# Patient Record
Sex: FEMALE | Race: WHITE | NOT HISPANIC OR LATINO
[De-identification: names, ages, dates, MRNs, and addresses within clinical notes are randomized per-mention and may not be internally consistent; named-entity substitution may affect disease eponyms.]

---

## 2017-01-18 ENCOUNTER — APPOINTMENT (OUTPATIENT)
Dept: NEUROLOGY | Facility: CLINIC | Age: 42
End: 2017-01-18

## 2017-02-07 ENCOUNTER — APPOINTMENT (OUTPATIENT)
Dept: INTERNAL MEDICINE | Facility: CLINIC | Age: 42
End: 2017-02-07

## 2017-02-07 VITALS
BODY MASS INDEX: 24.91 KG/M2 | WEIGHT: 155 LBS | HEART RATE: 89 BPM | DIASTOLIC BLOOD PRESSURE: 76 MMHG | HEIGHT: 66 IN | SYSTOLIC BLOOD PRESSURE: 124 MMHG | OXYGEN SATURATION: 98 %

## 2017-02-24 LAB
25(OH)D3 SERPL-MCNC: 15.4 NG/ML
ALBUMIN SERPL ELPH-MCNC: 4.3 G/DL
ALP BLD-CCNC: 62 U/L
ALT SERPL-CCNC: 18 U/L
ANA PAT FLD IF-IMP: ABNORMAL
ANA SER IF-ACNC: ABNORMAL
ANION GAP SERPL CALC-SCNC: 12 MMOL/L
AST SERPL-CCNC: 25 U/L
BASOPHILS # BLD AUTO: 0.06 K/UL
BASOPHILS NFR BLD AUTO: 0.7 %
BILIRUB SERPL-MCNC: 0.9 MG/DL
BUN SERPL-MCNC: 7 MG/DL
CALCIUM SERPL-MCNC: 9.6 MG/DL
CCP AB SER IA-ACNC: 9 UNITS
CHLORIDE SERPL-SCNC: 101 MMOL/L
CO2 SERPL-SCNC: 26 MMOL/L
CREAT SERPL-MCNC: 0.78 MG/DL
EOSINOPHIL # BLD AUTO: 0.06 K/UL
EOSINOPHIL NFR BLD AUTO: 0.7 %
GLUCOSE SERPL-MCNC: 78 MG/DL
HBA1C MFR BLD HPLC: 5.6 %
HCT VFR BLD CALC: 39 %
HGB BLD-MCNC: 12.2 G/DL
IMM GRANULOCYTES NFR BLD AUTO: 0.2 %
LYMPHOCYTES # BLD AUTO: 2.41 K/UL
LYMPHOCYTES NFR BLD AUTO: 28.9 %
MAN DIFF?: NORMAL
MCHC RBC-ENTMCNC: 26 PG
MCHC RBC-ENTMCNC: 31.3 GM/DL
MCV RBC AUTO: 83.2 FL
MONOCYTES # BLD AUTO: 0.76 K/UL
MONOCYTES NFR BLD AUTO: 9.1 %
NEUTROPHILS # BLD AUTO: 5.04 K/UL
NEUTROPHILS NFR BLD AUTO: 60.4 %
PLATELET # BLD AUTO: 378 K/UL
POTASSIUM SERPL-SCNC: 4.4 MMOL/L
PROT SERPL-MCNC: 8.3 G/DL
RBC # BLD: 4.69 M/UL
RBC # FLD: 14.8 %
RF+CCP IGG SER-IMP: NEGATIVE
RHEUMATOID FACT SER QL: 17.3 IU/ML
SODIUM SERPL-SCNC: 139 MMOL/L
T4 FREE SERPL-MCNC: 1.1 NG/DL
TSH SERPL-ACNC: 1.11 UIU/ML
WBC # FLD AUTO: 8.35 K/UL

## 2017-02-28 ENCOUNTER — MEDICATION RENEWAL (OUTPATIENT)
Age: 42
End: 2017-02-28

## 2017-03-01 ENCOUNTER — MEDICATION RENEWAL (OUTPATIENT)
Age: 42
End: 2017-03-01

## 2017-03-16 ENCOUNTER — APPOINTMENT (OUTPATIENT)
Dept: INTERNAL MEDICINE | Facility: CLINIC | Age: 42
End: 2017-03-16

## 2017-03-16 VITALS
SYSTOLIC BLOOD PRESSURE: 104 MMHG | DIASTOLIC BLOOD PRESSURE: 64 MMHG | HEIGHT: 66 IN | WEIGHT: 154 LBS | BODY MASS INDEX: 24.75 KG/M2 | TEMPERATURE: 98.5 F

## 2017-03-20 LAB
ANION GAP SERPL CALC-SCNC: 13 MMOL/L
BASOPHILS # BLD AUTO: 0.02 K/UL
BASOPHILS NFR BLD AUTO: 0.3 %
BUN SERPL-MCNC: 8 MG/DL
CALCIUM SERPL-MCNC: 9.3 MG/DL
CHLORIDE SERPL-SCNC: 100 MMOL/L
CO2 SERPL-SCNC: 24 MMOL/L
CREAT SERPL-MCNC: 0.88 MG/DL
EOSINOPHIL # BLD AUTO: 0.01 K/UL
EOSINOPHIL NFR BLD AUTO: 0.2 %
GLUCOSE SERPL-MCNC: 89 MG/DL
HCT VFR BLD CALC: 41.5 %
HGB BLD-MCNC: 13 G/DL
IMM GRANULOCYTES NFR BLD AUTO: 0.3 %
LYMPHOCYTES # BLD AUTO: 1.49 K/UL
LYMPHOCYTES NFR BLD AUTO: 24 %
MAN DIFF?: NORMAL
MCHC RBC-ENTMCNC: 26.7 PG
MCHC RBC-ENTMCNC: 31.3 GM/DL
MCV RBC AUTO: 85.2 FL
MONOCYTES # BLD AUTO: 1.02 K/UL
MONOCYTES NFR BLD AUTO: 16.4 %
NEUTROPHILS # BLD AUTO: 3.65 K/UL
NEUTROPHILS NFR BLD AUTO: 58.8 %
PLATELET # BLD AUTO: 290 K/UL
POTASSIUM SERPL-SCNC: 4.2 MMOL/L
RBC # BLD: 4.87 M/UL
RBC # FLD: 15.2 %
SODIUM SERPL-SCNC: 137 MMOL/L
TSH SERPL-ACNC: 0.97 UIU/ML
WBC # FLD AUTO: 6.21 K/UL

## 2017-03-22 ENCOUNTER — OTHER (OUTPATIENT)
Age: 42
End: 2017-03-22

## 2017-04-03 ENCOUNTER — APPOINTMENT (OUTPATIENT)
Dept: RADIOLOGY | Facility: IMAGING CENTER | Age: 42
End: 2017-04-03

## 2017-04-03 ENCOUNTER — OUTPATIENT (OUTPATIENT)
Dept: OUTPATIENT SERVICES | Facility: HOSPITAL | Age: 42
LOS: 1 days | End: 2017-04-03
Payer: COMMERCIAL

## 2017-04-03 DIAGNOSIS — Z90.89 ACQUIRED ABSENCE OF OTHER ORGANS: Chronic | ICD-10-CM

## 2017-04-03 DIAGNOSIS — Z98.89 OTHER SPECIFIED POSTPROCEDURAL STATES: Chronic | ICD-10-CM

## 2017-04-03 DIAGNOSIS — C43.71 MALIGNANT MELANOMA OF RIGHT LOWER LIMB, INCLUDING HIP: Chronic | ICD-10-CM

## 2017-04-03 DIAGNOSIS — J47.9 BRONCHIECTASIS, UNCOMPLICATED: ICD-10-CM

## 2017-04-03 PROCEDURE — 71046 X-RAY EXAM CHEST 2 VIEWS: CPT

## 2017-04-07 ENCOUNTER — APPOINTMENT (OUTPATIENT)
Dept: PULMONOLOGY | Facility: CLINIC | Age: 42
End: 2017-04-07

## 2017-04-07 VITALS
WEIGHT: 157 LBS | DIASTOLIC BLOOD PRESSURE: 70 MMHG | HEIGHT: 66 IN | HEART RATE: 87 BPM | SYSTOLIC BLOOD PRESSURE: 100 MMHG | RESPIRATION RATE: 16 BRPM | TEMPERATURE: 99.2 F | BODY MASS INDEX: 25.23 KG/M2

## 2017-04-07 DIAGNOSIS — Z83.49 FAMILY HISTORY OF OTHER ENDOCRINE, NUTRITIONAL AND METABOLIC DISEASES: ICD-10-CM

## 2017-04-19 ENCOUNTER — APPOINTMENT (OUTPATIENT)
Dept: CT IMAGING | Facility: IMAGING CENTER | Age: 42
End: 2017-04-19

## 2017-04-19 ENCOUNTER — OUTPATIENT (OUTPATIENT)
Dept: OUTPATIENT SERVICES | Facility: HOSPITAL | Age: 42
LOS: 1 days | End: 2017-04-19
Payer: COMMERCIAL

## 2017-04-19 DIAGNOSIS — C43.71 MALIGNANT MELANOMA OF RIGHT LOWER LIMB, INCLUDING HIP: Chronic | ICD-10-CM

## 2017-04-19 DIAGNOSIS — R05 COUGH: ICD-10-CM

## 2017-04-19 DIAGNOSIS — Z98.89 OTHER SPECIFIED POSTPROCEDURAL STATES: Chronic | ICD-10-CM

## 2017-04-19 DIAGNOSIS — Z90.89 ACQUIRED ABSENCE OF OTHER ORGANS: Chronic | ICD-10-CM

## 2017-04-19 PROCEDURE — 70486 CT MAXILLOFACIAL W/O DYE: CPT

## 2017-04-24 ENCOUNTER — RX RENEWAL (OUTPATIENT)
Age: 42
End: 2017-04-24

## 2017-04-27 ENCOUNTER — APPOINTMENT (OUTPATIENT)
Dept: DERMATOLOGY | Facility: CLINIC | Age: 42
End: 2017-04-27

## 2017-04-27 VITALS — DIASTOLIC BLOOD PRESSURE: 70 MMHG | SYSTOLIC BLOOD PRESSURE: 110 MMHG

## 2017-05-16 ENCOUNTER — APPOINTMENT (OUTPATIENT)
Dept: DERMATOLOGY | Facility: CLINIC | Age: 42
End: 2017-05-16

## 2017-05-23 ENCOUNTER — APPOINTMENT (OUTPATIENT)
Dept: INTERNAL MEDICINE | Facility: CLINIC | Age: 42
End: 2017-05-23

## 2017-06-22 ENCOUNTER — MEDICATION RENEWAL (OUTPATIENT)
Age: 42
End: 2017-06-22

## 2017-06-22 RX ORDER — INHALER, ASSIST DEVICES
SPACER (EA) MISCELLANEOUS
Qty: 1 | Refills: 0 | Status: ACTIVE | COMMUNITY
Start: 2017-06-22 | End: 1900-01-01

## 2017-08-07 ENCOUNTER — APPOINTMENT (OUTPATIENT)
Dept: DERMATOLOGY | Facility: CLINIC | Age: 42
End: 2017-08-07
Payer: COMMERCIAL

## 2017-08-07 VITALS — DIASTOLIC BLOOD PRESSURE: 62 MMHG | SYSTOLIC BLOOD PRESSURE: 100 MMHG

## 2017-08-07 DIAGNOSIS — L82.1 OTHER SEBORRHEIC KERATOSIS: ICD-10-CM

## 2017-08-07 DIAGNOSIS — D22.5 MELANOCYTIC NEVI OF TRUNK: ICD-10-CM

## 2017-08-07 PROCEDURE — 99214 OFFICE O/P EST MOD 30 MIN: CPT | Mod: GC

## 2017-08-11 ENCOUNTER — RX RENEWAL (OUTPATIENT)
Age: 42
End: 2017-08-11

## 2017-08-11 ENCOUNTER — APPOINTMENT (OUTPATIENT)
Dept: INTERNAL MEDICINE | Facility: CLINIC | Age: 42
End: 2017-08-11
Payer: COMMERCIAL

## 2017-08-11 VITALS
TEMPERATURE: 98.9 F | HEIGHT: 66 IN | DIASTOLIC BLOOD PRESSURE: 60 MMHG | BODY MASS INDEX: 25.39 KG/M2 | SYSTOLIC BLOOD PRESSURE: 102 MMHG | WEIGHT: 158 LBS

## 2017-08-11 PROCEDURE — 87880 STREP A ASSAY W/OPTIC: CPT | Mod: QW

## 2017-08-11 PROCEDURE — 99214 OFFICE O/P EST MOD 30 MIN: CPT | Mod: 25

## 2017-08-11 RX ORDER — PREDNISONE 20 MG/1
20 TABLET ORAL DAILY
Qty: 9 | Refills: 0 | Status: DISCONTINUED | COMMUNITY
Start: 2017-03-16 | End: 2017-08-11

## 2017-08-11 RX ORDER — FLUCONAZOLE 150 MG/1
150 TABLET ORAL
Qty: 2 | Refills: 0 | Status: COMPLETED | COMMUNITY
Start: 2017-08-11 | End: 2017-08-16

## 2017-08-18 LAB — S PYO AG SPEC QL IA: POSITIVE

## 2017-08-25 ENCOUNTER — APPOINTMENT (OUTPATIENT)
Dept: INTERNAL MEDICINE | Facility: CLINIC | Age: 42
End: 2017-08-25
Payer: COMMERCIAL

## 2017-08-25 ENCOUNTER — EMERGENCY (EMERGENCY)
Facility: HOSPITAL | Age: 42
LOS: 1 days | Discharge: HOME | End: 2017-08-25
Attending: EMERGENCY MEDICINE | Admitting: EMERGENCY MEDICINE
Payer: COMMERCIAL

## 2017-08-25 VITALS
OXYGEN SATURATION: 98 % | DIASTOLIC BLOOD PRESSURE: 72 MMHG | SYSTOLIC BLOOD PRESSURE: 111 MMHG | TEMPERATURE: 99 F | HEART RATE: 69 BPM | RESPIRATION RATE: 20 BRPM

## 2017-08-25 VITALS
TEMPERATURE: 99 F | RESPIRATION RATE: 20 BRPM | OXYGEN SATURATION: 97 % | HEART RATE: 70 BPM | DIASTOLIC BLOOD PRESSURE: 74 MMHG | SYSTOLIC BLOOD PRESSURE: 106 MMHG

## 2017-08-25 VITALS
SYSTOLIC BLOOD PRESSURE: 112 MMHG | BODY MASS INDEX: 24.75 KG/M2 | HEART RATE: 100 BPM | DIASTOLIC BLOOD PRESSURE: 70 MMHG | OXYGEN SATURATION: 97 % | WEIGHT: 154 LBS | HEIGHT: 66 IN

## 2017-08-25 DIAGNOSIS — Z90.89 ACQUIRED ABSENCE OF OTHER ORGANS: Chronic | ICD-10-CM

## 2017-08-25 DIAGNOSIS — C43.71 MALIGNANT MELANOMA OF RIGHT LOWER LIMB, INCLUDING HIP: Chronic | ICD-10-CM

## 2017-08-25 DIAGNOSIS — Z98.89 OTHER SPECIFIED POSTPROCEDURAL STATES: Chronic | ICD-10-CM

## 2017-08-25 LAB
ALBUMIN SERPL ELPH-MCNC: 4.4 G/DL — SIGNIFICANT CHANGE UP (ref 3.3–5)
ALP SERPL-CCNC: 64 U/L — SIGNIFICANT CHANGE UP (ref 40–120)
ALT FLD-CCNC: 15 U/L RC — SIGNIFICANT CHANGE UP (ref 10–45)
ANION GAP SERPL CALC-SCNC: 14 MMOL/L — SIGNIFICANT CHANGE UP (ref 5–17)
APPEARANCE UR: CLEAR — SIGNIFICANT CHANGE UP
AST SERPL-CCNC: 18 U/L — SIGNIFICANT CHANGE UP (ref 10–40)
BACTERIA # UR AUTO: ABNORMAL /HPF
BASOPHILS # BLD AUTO: 0.1 K/UL — SIGNIFICANT CHANGE UP (ref 0–0.2)
BASOPHILS NFR BLD AUTO: 0.6 % — SIGNIFICANT CHANGE UP (ref 0–2)
BILIRUB SERPL-MCNC: 0.8 MG/DL — SIGNIFICANT CHANGE UP (ref 0.2–1.2)
BILIRUB UR-MCNC: NEGATIVE — SIGNIFICANT CHANGE UP
BUN SERPL-MCNC: 10 MG/DL — SIGNIFICANT CHANGE UP (ref 7–23)
CALCIUM SERPL-MCNC: 9.7 MG/DL — SIGNIFICANT CHANGE UP (ref 8.4–10.5)
CHLORIDE SERPL-SCNC: 100 MMOL/L — SIGNIFICANT CHANGE UP (ref 96–108)
CO2 SERPL-SCNC: 24 MMOL/L — SIGNIFICANT CHANGE UP (ref 22–31)
COLOR SPEC: SIGNIFICANT CHANGE UP
CREAT SERPL-MCNC: 0.93 MG/DL — SIGNIFICANT CHANGE UP (ref 0.5–1.3)
DIFF PNL FLD: NEGATIVE — SIGNIFICANT CHANGE UP
EOSINOPHIL # BLD AUTO: 0.1 K/UL — SIGNIFICANT CHANGE UP (ref 0–0.5)
EOSINOPHIL NFR BLD AUTO: 0.9 % — SIGNIFICANT CHANGE UP (ref 0–6)
EPI CELLS # UR: SIGNIFICANT CHANGE UP /HPF
GLUCOSE SERPL-MCNC: 89 MG/DL — SIGNIFICANT CHANGE UP (ref 70–99)
GLUCOSE UR QL: NEGATIVE — SIGNIFICANT CHANGE UP
HCT VFR BLD CALC: 42.3 % — SIGNIFICANT CHANGE UP (ref 34.5–45)
HGB BLD-MCNC: 14.5 G/DL — SIGNIFICANT CHANGE UP (ref 11.5–15.5)
KETONES UR-MCNC: NEGATIVE — SIGNIFICANT CHANGE UP
LEUKOCYTE ESTERASE UR-ACNC: NEGATIVE — SIGNIFICANT CHANGE UP
LIDOCAIN IGE QN: 17 U/L — SIGNIFICANT CHANGE UP (ref 7–60)
LYMPHOCYTES # BLD AUTO: 28.5 % — SIGNIFICANT CHANGE UP (ref 13–44)
LYMPHOCYTES # BLD AUTO: 3.2 K/UL — SIGNIFICANT CHANGE UP (ref 1–3.3)
MCHC RBC-ENTMCNC: 28.8 PG — SIGNIFICANT CHANGE UP (ref 27–34)
MCHC RBC-ENTMCNC: 34.2 GM/DL — SIGNIFICANT CHANGE UP (ref 32–36)
MCV RBC AUTO: 84.1 FL — SIGNIFICANT CHANGE UP (ref 80–100)
MONOCYTES # BLD AUTO: 0.9 K/UL — SIGNIFICANT CHANGE UP (ref 0–0.9)
MONOCYTES NFR BLD AUTO: 7.6 % — SIGNIFICANT CHANGE UP (ref 2–14)
NEUTROPHILS # BLD AUTO: 7.1 K/UL — SIGNIFICANT CHANGE UP (ref 1.8–7.4)
NEUTROPHILS NFR BLD AUTO: 62.3 % — SIGNIFICANT CHANGE UP (ref 43–77)
NITRITE UR-MCNC: NEGATIVE — SIGNIFICANT CHANGE UP
PH UR: 6.5 — SIGNIFICANT CHANGE UP (ref 5–8)
PLATELET # BLD AUTO: 351 K/UL — SIGNIFICANT CHANGE UP (ref 150–400)
POTASSIUM SERPL-MCNC: 3.9 MMOL/L — SIGNIFICANT CHANGE UP (ref 3.5–5.3)
POTASSIUM SERPL-SCNC: 3.9 MMOL/L — SIGNIFICANT CHANGE UP (ref 3.5–5.3)
PROT SERPL-MCNC: 8.4 G/DL — HIGH (ref 6–8.3)
PROT UR-MCNC: NEGATIVE — SIGNIFICANT CHANGE UP
RBC # BLD: 5.03 M/UL — SIGNIFICANT CHANGE UP (ref 3.8–5.2)
RBC # FLD: 12.9 % — SIGNIFICANT CHANGE UP (ref 10.3–14.5)
RBC CASTS # UR COMP ASSIST: SIGNIFICANT CHANGE UP /HPF (ref 0–2)
SODIUM SERPL-SCNC: 138 MMOL/L — SIGNIFICANT CHANGE UP (ref 135–145)
SP GR SPEC: 1.01 — LOW (ref 1.01–1.02)
UROBILINOGEN FLD QL: NEGATIVE — SIGNIFICANT CHANGE UP
WBC # BLD: 11.3 K/UL — HIGH (ref 3.8–10.5)
WBC # FLD AUTO: 11.3 K/UL — HIGH (ref 3.8–10.5)
WBC UR QL: SIGNIFICANT CHANGE UP /HPF (ref 0–5)

## 2017-08-25 PROCEDURE — 76830 TRANSVAGINAL US NON-OB: CPT | Mod: 26

## 2017-08-25 PROCEDURE — 74177 CT ABD & PELVIS W/CONTRAST: CPT

## 2017-08-25 PROCEDURE — 99284 EMERGENCY DEPT VISIT MOD MDM: CPT | Mod: 25

## 2017-08-25 PROCEDURE — 83690 ASSAY OF LIPASE: CPT

## 2017-08-25 PROCEDURE — 99285 EMERGENCY DEPT VISIT HI MDM: CPT

## 2017-08-25 PROCEDURE — 76830 TRANSVAGINAL US NON-OB: CPT

## 2017-08-25 PROCEDURE — 93975 VASCULAR STUDY: CPT

## 2017-08-25 PROCEDURE — 76856 US EXAM PELVIC COMPLETE: CPT

## 2017-08-25 PROCEDURE — 76856 US EXAM PELVIC COMPLETE: CPT | Mod: 26,59

## 2017-08-25 PROCEDURE — 81001 URINALYSIS AUTO W/SCOPE: CPT

## 2017-08-25 PROCEDURE — 85027 COMPLETE CBC AUTOMATED: CPT

## 2017-08-25 PROCEDURE — 80053 COMPREHEN METABOLIC PANEL: CPT

## 2017-08-25 PROCEDURE — 99214 OFFICE O/P EST MOD 30 MIN: CPT

## 2017-08-25 PROCEDURE — 74177 CT ABD & PELVIS W/CONTRAST: CPT | Mod: 26

## 2017-08-25 RX ORDER — AMOXICILLIN AND CLAVULANATE POTASSIUM 875; 125 MG/1; MG/1
875-125 TABLET, COATED ORAL
Qty: 20 | Refills: 0 | Status: COMPLETED | COMMUNITY
Start: 2017-08-11 | End: 2017-08-25

## 2017-08-25 RX ORDER — SODIUM CHLORIDE 9 MG/ML
1000 INJECTION INTRAMUSCULAR; INTRAVENOUS; SUBCUTANEOUS ONCE
Qty: 0 | Refills: 0 | Status: COMPLETED | OUTPATIENT
Start: 2017-08-25 | End: 2017-08-25

## 2017-08-25 RX ORDER — FLUCONAZOLE 150 MG/1
1 TABLET ORAL
Qty: 1 | Refills: 0 | OUTPATIENT
Start: 2017-08-25 | End: 2017-08-26

## 2017-08-25 RX ORDER — AZITHROMYCIN 500 MG/1
1 TABLET, FILM COATED ORAL
Qty: 6 | Refills: 0 | OUTPATIENT
Start: 2017-08-25 | End: 2017-08-30

## 2017-08-25 RX ORDER — CEFDINIR 250 MG/5ML
1 POWDER, FOR SUSPENSION ORAL
Qty: 14 | Refills: 0 | OUTPATIENT
Start: 2017-08-25 | End: 2017-09-01

## 2017-08-25 RX ADMIN — SODIUM CHLORIDE 2000 MILLILITER(S): 9 INJECTION INTRAMUSCULAR; INTRAVENOUS; SUBCUTANEOUS at 13:15

## 2017-08-25 NOTE — ED ADULT NURSE NOTE - PMH
Acquired hypothyroidism    Allergic rhinitis    Bronchiectasis    GERD (gastroesophageal reflux disease)

## 2017-08-25 NOTE — CONSULT NOTE ADULT - SUBJECTIVE AND OBJECTIVE BOX
CC: Patient is a 42y old  Female who presents with a chief complaint of abdominal pain    HPI:  41 yo female complaining of abdominal pain for approximately 2 days. The pain started periumbilically and then migrated to the right lower quadrant. She denies nausea or vomiting. She has noted anorexia. She has had no fevers or chills. She saw her PMD Dr. Lynne and was instructed to go to the ER.     PMH  GERD (gastroesophageal reflux disease)  Allergic rhinitis  Bronchiectasis  Acquired hypothyroidism    PSH  Malignant melanoma of skin of right lower leg  H/O  section  S/P T&A (status post tonsillectomy and adenoidectomy)    MEDS    Allergies  synthroid  albuterol  symbicort  [place]      Augmentin (Rash)  sulfa drugs (Unknown)  sulfamethoxazole-trimethoprim (Rash)    Intolerances    vancomycin (Red man syndrome)      Social        Physical Exam  Gen: NAD  HEENT: normocephalic, EOMI  CV: S1, S2  Pulm: breath sounds bilaterally  Abd: Soft, ND, right lower quadrant tenderness  Ext: warm      Labs:                        14.5   11.3  )-----------( 351      ( 25 Aug 2017 12:45 )             42.3         138  |  100  |  10  ----------------------------<  89  3.9   |  24  |  0.93    Ca    9.7      25 Aug 2017 12:45    TPro  8.4<H>  /  Alb  4.4  /  TBili  0.8  /  DBili  x   /  AST  18  /  ALT  15  /  AlkPhos  64        Urinalysis Basic - ( 25 Aug 2017 12:45 )    Color: x / Appearance: Clear / S.008 / pH: x  Gluc: x / Ketone: Negative  / Bili: Negative / Urobili: Negative   Blood: x / Protein: Negative / Nitrite: Negative   Leuk Esterase: Negative / RBC: 0-2 /HPF / WBC 0-2 /HPF   Sq Epi: x / Non Sq Epi: OCC /HPF / Bacteria: Few /HPF      Imaging  CT Abdomen and Pelvis w/ Oral Cont and w/ IV Cont (17 @ 15:07)   Normal appendix.  Bronchiectasis and tree-in-bud opacities in the right middle lobe, right   lower lobe, and left lower lobe, more pronounced than on chest CT of   2016. Findings may be seen in setting of infection.

## 2017-08-25 NOTE — CONSULT NOTE ADULT - ASSESSMENT
41 yo female with abdominal pain, prominent ovarian follicle demonstrated on CT  -US of pelvis ordered  -Recommend Gyn consult if pain continues  -Prominent periaortic lymph nodes may represent mesenteric adenitis, usually viral may benefit from NSAIDs and fluids  -Findings discussed with Dr. Sanchez

## 2017-08-25 NOTE — ED PROVIDER NOTE - PROGRESS NOTE DETAILS
us and ctap essentially benign, given the noted findings and that she has been off zitrhomycin 3 weeks will treat for poss pna. pt still with mild right upper quadrant pain but declines offered us ruq.

## 2017-08-25 NOTE — ED PROVIDER NOTE - OBJECTIVE STATEMENT
42yof pmhx of Hypothyroid, hx of bronchiectasis, GERD here with 2 days of RLQ pain. pt reports pain started yesterday as periumbilical and since last night isolated with RLQ with nausea and chills. No vomiting. no fever. Hx of Csection. No vaginal discharge or dysuria. No radiation of pain LMP 3 weeks.

## 2017-08-25 NOTE — ED PROVIDER NOTE - CARE PLAN
Principal Discharge DX:	Abdominal pain, unspecified location  Goal:	rlq, possibly due to ovarian cyst

## 2017-08-25 NOTE — ED PROVIDER NOTE - PSH
H/O  section    Malignant melanoma of skin of right lower leg  20yrs ago  S/P T&A (status post tonsillectomy and adenoidectomy)

## 2017-08-25 NOTE — ED ADULT NURSE NOTE - OBJECTIVE STATEMENT
Pt presents to Ed with c/o abd pain x 2 days. Pt reports experiencing pain in her rt lower abdomen  for 2 days. Pt A&Ox3 denies pain or burn on urination, no fever chills nausea vomiting or diarrhea. Lungs  cta b/l abd soft and tender rt lower quadrant, skin warm and dry.

## 2017-08-25 NOTE — ED PROVIDER NOTE - ATTENDING CONTRIBUTION TO CARE
42 female RLE abdominal pain, 2 days, no dysuria. abdominal exam mildly tender to right lower quadrant at [facility]. surg consult at bedside. ctap, if neg us pelvis.

## 2017-08-30 ENCOUNTER — MEDICATION RENEWAL (OUTPATIENT)
Age: 42
End: 2017-08-30

## 2017-09-01 ENCOUNTER — MEDICATION RENEWAL (OUTPATIENT)
Age: 42
End: 2017-09-01

## 2017-09-11 ENCOUNTER — RX RENEWAL (OUTPATIENT)
Age: 42
End: 2017-09-11

## 2017-09-27 ENCOUNTER — MEDICATION RENEWAL (OUTPATIENT)
Age: 42
End: 2017-09-27

## 2017-09-27 ENCOUNTER — OTHER (OUTPATIENT)
Age: 42
End: 2017-09-27

## 2017-10-26 ENCOUNTER — RESULT REVIEW (OUTPATIENT)
Age: 42
End: 2017-10-26

## 2017-10-31 ENCOUNTER — RX RENEWAL (OUTPATIENT)
Age: 42
End: 2017-10-31

## 2017-12-05 ENCOUNTER — APPOINTMENT (OUTPATIENT)
Dept: INTERNAL MEDICINE | Facility: CLINIC | Age: 42
End: 2017-12-05
Payer: COMMERCIAL

## 2017-12-05 ENCOUNTER — APPOINTMENT (OUTPATIENT)
Dept: INTERNAL MEDICINE | Facility: CLINIC | Age: 42
End: 2017-12-05

## 2017-12-05 VITALS
WEIGHT: 164 LBS | HEART RATE: 98 BPM | SYSTOLIC BLOOD PRESSURE: 110 MMHG | BODY MASS INDEX: 26.36 KG/M2 | DIASTOLIC BLOOD PRESSURE: 80 MMHG | TEMPERATURE: 98.2 F | OXYGEN SATURATION: 98 % | HEIGHT: 66 IN

## 2017-12-05 DIAGNOSIS — Z87.898 PERSONAL HISTORY OF OTHER SPECIFIED CONDITIONS: ICD-10-CM

## 2017-12-05 DIAGNOSIS — M25.549 PAIN IN JOINTS OF UNSPECIFIED HAND: ICD-10-CM

## 2017-12-05 DIAGNOSIS — R06.89 OTHER ABNORMALITIES OF BREATHING: ICD-10-CM

## 2017-12-05 DIAGNOSIS — Z12.83 ENCOUNTER FOR SCREENING FOR MALIGNANT NEOPLASM OF SKIN: ICD-10-CM

## 2017-12-05 DIAGNOSIS — J03.00 ACUTE STREPTOCOCCAL TONSILLITIS, UNSPECIFIED: ICD-10-CM

## 2017-12-05 DIAGNOSIS — L90.5 SCAR CONDITIONS AND FIBROSIS OF SKIN: ICD-10-CM

## 2017-12-05 DIAGNOSIS — R20.0 ANESTHESIA OF SKIN: ICD-10-CM

## 2017-12-05 DIAGNOSIS — J02.0 STREPTOCOCCAL PHARYNGITIS: ICD-10-CM

## 2017-12-05 PROCEDURE — 81003 URINALYSIS AUTO W/O SCOPE: CPT | Mod: QW

## 2017-12-05 PROCEDURE — 90686 IIV4 VACC NO PRSV 0.5 ML IM: CPT

## 2017-12-05 PROCEDURE — 99214 OFFICE O/P EST MOD 30 MIN: CPT | Mod: 25

## 2017-12-05 PROCEDURE — G0008: CPT

## 2017-12-06 ENCOUNTER — RX RENEWAL (OUTPATIENT)
Age: 42
End: 2017-12-06

## 2017-12-10 ENCOUNTER — FORM ENCOUNTER (OUTPATIENT)
Age: 42
End: 2017-12-10

## 2017-12-11 ENCOUNTER — OUTPATIENT (OUTPATIENT)
Dept: OUTPATIENT SERVICES | Facility: HOSPITAL | Age: 42
LOS: 1 days | End: 2017-12-11
Payer: COMMERCIAL

## 2017-12-11 ENCOUNTER — APPOINTMENT (OUTPATIENT)
Dept: ULTRASOUND IMAGING | Facility: CLINIC | Age: 42
End: 2017-12-11

## 2017-12-11 DIAGNOSIS — Z98.89 OTHER SPECIFIED POSTPROCEDURAL STATES: Chronic | ICD-10-CM

## 2017-12-11 DIAGNOSIS — Z90.89 ACQUIRED ABSENCE OF OTHER ORGANS: Chronic | ICD-10-CM

## 2017-12-11 DIAGNOSIS — C43.71 MALIGNANT MELANOMA OF RIGHT LOWER LIMB, INCLUDING HIP: Chronic | ICD-10-CM

## 2017-12-11 DIAGNOSIS — Z00.8 ENCOUNTER FOR OTHER GENERAL EXAMINATION: ICD-10-CM

## 2017-12-11 PROCEDURE — 76536 US EXAM OF HEAD AND NECK: CPT

## 2017-12-11 PROCEDURE — 76536 US EXAM OF HEAD AND NECK: CPT | Mod: 26

## 2017-12-13 LAB
ANION GAP SERPL CALC-SCNC: 14 MMOL/L
BASOPHILS # BLD AUTO: 0.04 K/UL
BASOPHILS NFR BLD AUTO: 0.4 %
BUN SERPL-MCNC: 10 MG/DL
CALCIUM SERPL-MCNC: 9.7 MG/DL
CHLORIDE SERPL-SCNC: 101 MMOL/L
CO2 SERPL-SCNC: 22 MMOL/L
CREAT SERPL-MCNC: 0.93 MG/DL
EOSINOPHIL # BLD AUTO: 0.15 K/UL
EOSINOPHIL NFR BLD AUTO: 1.7 %
FSH: 1.3 MIU/ML
GLUCOSE SERPL-MCNC: 78 MG/DL
HBA1C MFR BLD HPLC: 5.4 %
HCT VFR BLD CALC: 42.4 %
HGB BLD-MCNC: 13.9 G/DL
IMM GRANULOCYTES NFR BLD AUTO: 0.1 %
LH SERPL-ACNC: 2.2 IU/L
LYMPHOCYTES # BLD AUTO: 2.48 K/UL
LYMPHOCYTES NFR BLD AUTO: 27.5 %
MAN DIFF?: NORMAL
MCHC RBC-ENTMCNC: 27.5 PG
MCHC RBC-ENTMCNC: 32.8 GM/DL
MCV RBC AUTO: 84 FL
MONOCYTES # BLD AUTO: 0.72 K/UL
MONOCYTES NFR BLD AUTO: 8 %
NEUTROPHILS # BLD AUTO: 5.62 K/UL
NEUTROPHILS NFR BLD AUTO: 62.3 %
PLATELET # BLD AUTO: 367 K/UL
POTASSIUM SERPL-SCNC: 4.5 MMOL/L
RBC # BLD: 5.05 M/UL
RBC # FLD: 13.5 %
SODIUM SERPL-SCNC: 137 MMOL/L
T4 FREE SERPL-MCNC: 1.2 NG/DL
TSH SERPL-ACNC: 1.32 UIU/ML
WBC # FLD AUTO: 9.02 K/UL

## 2017-12-18 ENCOUNTER — OUTPATIENT (OUTPATIENT)
Dept: OUTPATIENT SERVICES | Facility: HOSPITAL | Age: 42
LOS: 1 days | End: 2017-12-18
Payer: COMMERCIAL

## 2017-12-18 ENCOUNTER — APPOINTMENT (OUTPATIENT)
Dept: ULTRASOUND IMAGING | Facility: CLINIC | Age: 42
End: 2017-12-18

## 2017-12-18 ENCOUNTER — APPOINTMENT (OUTPATIENT)
Dept: MAMMOGRAPHY | Facility: CLINIC | Age: 42
End: 2017-12-18

## 2017-12-18 DIAGNOSIS — Z00.8 ENCOUNTER FOR OTHER GENERAL EXAMINATION: ICD-10-CM

## 2017-12-18 DIAGNOSIS — Z90.89 ACQUIRED ABSENCE OF OTHER ORGANS: Chronic | ICD-10-CM

## 2017-12-18 DIAGNOSIS — Z98.89 OTHER SPECIFIED POSTPROCEDURAL STATES: Chronic | ICD-10-CM

## 2017-12-18 DIAGNOSIS — C43.71 MALIGNANT MELANOMA OF RIGHT LOWER LIMB, INCLUDING HIP: Chronic | ICD-10-CM

## 2017-12-18 PROCEDURE — 77063 BREAST TOMOSYNTHESIS BI: CPT

## 2017-12-18 PROCEDURE — 76641 ULTRASOUND BREAST COMPLETE: CPT | Mod: 26,LT

## 2017-12-18 PROCEDURE — 76641 ULTRASOUND BREAST COMPLETE: CPT

## 2017-12-18 PROCEDURE — 77063 BREAST TOMOSYNTHESIS BI: CPT | Mod: 26

## 2017-12-18 PROCEDURE — 77067 SCR MAMMO BI INCL CAD: CPT

## 2017-12-18 PROCEDURE — G0202: CPT | Mod: 26

## 2017-12-29 ENCOUNTER — APPOINTMENT (OUTPATIENT)
Dept: MAMMOGRAPHY | Facility: CLINIC | Age: 42
End: 2017-12-29

## 2017-12-29 ENCOUNTER — APPOINTMENT (OUTPATIENT)
Dept: ULTRASOUND IMAGING | Facility: CLINIC | Age: 42
End: 2017-12-29

## 2017-12-29 ENCOUNTER — OUTPATIENT (OUTPATIENT)
Dept: OUTPATIENT SERVICES | Facility: HOSPITAL | Age: 42
LOS: 1 days | End: 2017-12-29
Payer: COMMERCIAL

## 2017-12-29 DIAGNOSIS — C43.71 MALIGNANT MELANOMA OF RIGHT LOWER LIMB, INCLUDING HIP: Chronic | ICD-10-CM

## 2017-12-29 DIAGNOSIS — Z98.89 OTHER SPECIFIED POSTPROCEDURAL STATES: Chronic | ICD-10-CM

## 2017-12-29 DIAGNOSIS — Z00.8 ENCOUNTER FOR OTHER GENERAL EXAMINATION: ICD-10-CM

## 2017-12-29 DIAGNOSIS — Z90.89 ACQUIRED ABSENCE OF OTHER ORGANS: Chronic | ICD-10-CM

## 2017-12-29 PROCEDURE — G0279: CPT | Mod: 26

## 2017-12-29 PROCEDURE — 76642 ULTRASOUND BREAST LIMITED: CPT | Mod: 26,LT

## 2017-12-29 PROCEDURE — G0279: CPT

## 2017-12-29 PROCEDURE — G0206: CPT | Mod: 26,LT

## 2017-12-29 PROCEDURE — 76642 ULTRASOUND BREAST LIMITED: CPT

## 2017-12-29 PROCEDURE — 77065 DX MAMMO INCL CAD UNI: CPT

## 2018-02-06 ENCOUNTER — APPOINTMENT (OUTPATIENT)
Dept: INTERNAL MEDICINE | Facility: CLINIC | Age: 43
End: 2018-02-06

## 2018-02-07 ENCOUNTER — APPOINTMENT (OUTPATIENT)
Dept: INTERNAL MEDICINE | Facility: CLINIC | Age: 43
End: 2018-02-07
Payer: COMMERCIAL

## 2018-02-07 VITALS
SYSTOLIC BLOOD PRESSURE: 110 MMHG | BODY MASS INDEX: 25.71 KG/M2 | HEIGHT: 66 IN | TEMPERATURE: 98.4 F | DIASTOLIC BLOOD PRESSURE: 70 MMHG | OXYGEN SATURATION: 98 % | WEIGHT: 160 LBS | HEART RATE: 76 BPM

## 2018-02-07 PROCEDURE — 99214 OFFICE O/P EST MOD 30 MIN: CPT

## 2018-02-21 ENCOUNTER — RX RENEWAL (OUTPATIENT)
Age: 43
End: 2018-02-21

## 2018-03-02 ENCOUNTER — APPOINTMENT (OUTPATIENT)
Dept: PULMONOLOGY | Facility: CLINIC | Age: 43
End: 2018-03-02
Payer: COMMERCIAL

## 2018-03-02 ENCOUNTER — LABORATORY RESULT (OUTPATIENT)
Age: 43
End: 2018-03-02

## 2018-03-02 VITALS
OXYGEN SATURATION: 94 % | TEMPERATURE: 97.4 F | WEIGHT: 154 LBS | BODY MASS INDEX: 24.75 KG/M2 | SYSTOLIC BLOOD PRESSURE: 110 MMHG | HEART RATE: 92 BPM | HEIGHT: 66 IN | DIASTOLIC BLOOD PRESSURE: 70 MMHG | RESPIRATION RATE: 16 BRPM

## 2018-03-02 PROCEDURE — 94729 DIFFUSING CAPACITY: CPT

## 2018-03-02 PROCEDURE — 99213 OFFICE O/P EST LOW 20 MIN: CPT | Mod: 25

## 2018-03-02 PROCEDURE — 94726 PLETHYSMOGRAPHY LUNG VOLUMES: CPT

## 2018-03-02 PROCEDURE — 94060 EVALUATION OF WHEEZING: CPT

## 2018-03-02 PROCEDURE — ZZZZZ: CPT

## 2018-03-21 LAB
BASOPHILS # BLD AUTO: 0.06 K/UL
BASOPHILS NFR BLD AUTO: 0.6 %
EOSINOPHIL # BLD AUTO: 0.23 K/UL
EOSINOPHIL NFR BLD AUTO: 2.3 %
HCT VFR BLD CALC: 43.3 %
HGB BLD-MCNC: 14.3 G/DL
IMM GRANULOCYTES NFR BLD AUTO: 0.2 %
LYMPHOCYTES # BLD AUTO: 2.43 K/UL
LYMPHOCYTES NFR BLD AUTO: 24.1 %
MAN DIFF?: NORMAL
MCHC RBC-ENTMCNC: 28 PG
MCHC RBC-ENTMCNC: 33 GM/DL
MCV RBC AUTO: 84.7 FL
MONOCYTES # BLD AUTO: 0.93 K/UL
MONOCYTES NFR BLD AUTO: 9.2 %
MPO AB + PR3 PNL SER: NORMAL
NEUTROPHILS # BLD AUTO: 6.43 K/UL
NEUTROPHILS NFR BLD AUTO: 63.6 %
PLATELET # BLD AUTO: 410 K/UL
RBC # BLD: 5.11 M/UL
RBC # FLD: 14.1 %
WBC # FLD AUTO: 10.1 K/UL

## 2018-04-09 ENCOUNTER — RX RENEWAL (OUTPATIENT)
Age: 43
End: 2018-04-09

## 2018-04-23 ENCOUNTER — RX RENEWAL (OUTPATIENT)
Age: 43
End: 2018-04-23

## 2018-05-07 ENCOUNTER — RX RENEWAL (OUTPATIENT)
Age: 43
End: 2018-05-07

## 2018-06-11 ENCOUNTER — APPOINTMENT (OUTPATIENT)
Dept: INTERNAL MEDICINE | Facility: CLINIC | Age: 43
End: 2018-06-11
Payer: COMMERCIAL

## 2018-06-11 VITALS
WEIGHT: 144 LBS | DIASTOLIC BLOOD PRESSURE: 54 MMHG | BODY MASS INDEX: 23.14 KG/M2 | TEMPERATURE: 98.3 F | OXYGEN SATURATION: 96 % | HEIGHT: 66 IN | SYSTOLIC BLOOD PRESSURE: 90 MMHG | HEART RATE: 108 BPM

## 2018-06-11 PROCEDURE — 99214 OFFICE O/P EST MOD 30 MIN: CPT

## 2018-06-11 NOTE — REVIEW OF SYSTEMS
[Fever] : fever [Chills] : chills [Fatigue] : fatigue [Hot Flashes] : hot flashes [Night Sweats] : night sweats [Nasal Discharge] : nasal discharge [Shortness Of Breath] : shortness of breath [Cough] : cough [Negative] : Genitourinary [Recent Change In Weight] : ~T no recent weight change [Earache] : no earache [Hearing Loss] : no hearing loss [Nosebleed] : no nosebleeds [Hoarseness] : no hoarseness [Sore Throat] : no sore throat [Postnasal Drip] : no postnasal drip [Chest Pain] : no chest pain [Palpitations] : no palpitations [Leg Claudication] : no leg claudication [Lower Ext Edema] : no lower extremity edema [Orthopnea] : no orthopnea [Paroysmal Nocturnal Dyspnea] : no paroysmal nocturnal dyspnea [Wheezing] : no wheezing [Dyspnea on Exertion] : no dyspnea on exertion

## 2018-06-11 NOTE — HEALTH RISK ASSESSMENT
[No falls in past year] : Patient reported no falls in the past year [0] : 2) Feeling down, depressed, or hopeless: Not at all (0) [] : No [de-identified] : social

## 2018-06-11 NOTE — PHYSICAL EXAM
[No Acute Distress] : no acute distress [Well Nourished] : well nourished [Well Developed] : well developed [Well-Appearing] : well-appearing [Normal Voice/Communication] : normal voice/communication [Normal Sclera/Conjunctiva] : normal sclera/conjunctiva [PERRL] : pupils equal round and reactive to light [Normal Outer Ear/Nose] : the outer ears and nose were normal in appearance [Normal Oropharynx] : the oropharynx was normal [Normal TMs] : both tympanic membranes were normal [No JVD] : no jugular venous distention [Supple] : supple [No Lymphadenopathy] : no lymphadenopathy [No Respiratory Distress] : no respiratory distress  [Clear to Auscultation] : lungs were clear to auscultation bilaterally [No Accessory Muscle Use] : no accessory muscle use [Normal Rate] : normal rate  [Regular Rhythm] : with a regular rhythm [Normal S1, S2] : normal S1 and S2 [Normal Supraclavicular Nodes] : no supraclavicular lymphadenopathy [Normal Axillary Nodes] : no axillary lymphadenopathy [Normal Anterior Cervical Nodes] : no anterior cervical lymphadenopathy [No CVA Tenderness] : no CVA  tenderness [No Spinal Tenderness] : no spinal tenderness [de-identified] : nasal turbinates mild erythema clear mucus

## 2018-06-11 NOTE — ASSESSMENT
[FreeTextEntry1] : Patient is a 43-year-old female with history of bronchiectasis/bronchospasm, hypothyroidism, hypercholesterolemia, prediabetes, seasonal allergies, reactive airway disease, who presents with 24 hours of fever, chills, sweats, nasal congestion nonproductive cough and nausea\par \par #1 acute rhinosinusitis\par Rest and fluids\par Atrovent nasal spray\par May use Afrin as dry for 2448 hrs.\par Nasal saline spray rest and fluids\par We'll: For Z-Tavares as per questioned\par \par #2 nausea\par Secondary to viral syndrome\par Hydration\par Compazine for nausea

## 2018-06-11 NOTE — HISTORY OF PRESENT ILLNESS
[FreeTextEntry8] : CC cough and nausea for the past 24 hours\par \par Patient is a 43-year-old female with history of bronchospasm bronchiectasis, hypothyroidism, malignant melanoma, prediabetes, seasonal allergies, hypothyroidism,10 vitamin D deficiency who present for 24 hours of feverish chills myalgia ,nonproductive occasional shortness of breath denies high fevers max 101.5 no dysuria, diarrhea, chest pain, sinus pain, sore throat no sick contact. patient's father  one week ago.

## 2018-06-13 ENCOUNTER — MEDICATION RENEWAL (OUTPATIENT)
Age: 43
End: 2018-06-13

## 2018-06-14 ENCOUNTER — INPATIENT (INPATIENT)
Facility: HOSPITAL | Age: 43
LOS: 0 days | Discharge: HOME | DRG: 871 | End: 2018-06-15
Attending: HOSPITALIST | Admitting: HOSPITALIST
Payer: COMMERCIAL

## 2018-06-14 VITALS
TEMPERATURE: 98 F | DIASTOLIC BLOOD PRESSURE: 71 MMHG | OXYGEN SATURATION: 94 % | SYSTOLIC BLOOD PRESSURE: 105 MMHG | WEIGHT: 139.99 LBS | HEIGHT: 66 IN | HEART RATE: 117 BPM | RESPIRATION RATE: 93 BRPM

## 2018-06-14 DIAGNOSIS — E80.6 OTHER DISORDERS OF BILIRUBIN METABOLISM: ICD-10-CM

## 2018-06-14 DIAGNOSIS — J18.9 PNEUMONIA, UNSPECIFIED ORGANISM: ICD-10-CM

## 2018-06-14 DIAGNOSIS — C43.71 MALIGNANT MELANOMA OF RIGHT LOWER LIMB, INCLUDING HIP: Chronic | ICD-10-CM

## 2018-06-14 DIAGNOSIS — A41.9 SEPSIS, UNSPECIFIED ORGANISM: ICD-10-CM

## 2018-06-14 DIAGNOSIS — R11.0 NAUSEA: ICD-10-CM

## 2018-06-14 DIAGNOSIS — Z98.89 OTHER SPECIFIED POSTPROCEDURAL STATES: Chronic | ICD-10-CM

## 2018-06-14 DIAGNOSIS — K21.9 GASTRO-ESOPHAGEAL REFLUX DISEASE WITHOUT ESOPHAGITIS: ICD-10-CM

## 2018-06-14 DIAGNOSIS — J47.9 BRONCHIECTASIS, UNCOMPLICATED: ICD-10-CM

## 2018-06-14 DIAGNOSIS — Z90.89 ACQUIRED ABSENCE OF OTHER ORGANS: Chronic | ICD-10-CM

## 2018-06-14 DIAGNOSIS — E87.8 OTHER DISORDERS OF ELECTROLYTE AND FLUID BALANCE, NOT ELSEWHERE CLASSIFIED: ICD-10-CM

## 2018-06-14 DIAGNOSIS — E03.9 HYPOTHYROIDISM, UNSPECIFIED: ICD-10-CM

## 2018-06-14 LAB
ALBUMIN SERPL ELPH-MCNC: 3.5 G/DL — SIGNIFICANT CHANGE UP (ref 3.3–5)
ALBUMIN SERPL ELPH-MCNC: 3.6 G/DL — SIGNIFICANT CHANGE UP (ref 3.3–5)
ALP SERPL-CCNC: 140 U/L — HIGH (ref 40–120)
ALP SERPL-CCNC: 141 U/L — HIGH (ref 40–120)
ALT FLD-CCNC: 29 U/L — SIGNIFICANT CHANGE UP (ref 10–45)
ALT FLD-CCNC: 30 U/L — SIGNIFICANT CHANGE UP (ref 10–45)
ANION GAP SERPL CALC-SCNC: 16 MMOL/L — SIGNIFICANT CHANGE UP (ref 5–17)
ANION GAP SERPL CALC-SCNC: 16 MMOL/L — SIGNIFICANT CHANGE UP (ref 5–17)
APPEARANCE UR: CLEAR — SIGNIFICANT CHANGE UP
AST SERPL-CCNC: 22 U/L — SIGNIFICANT CHANGE UP (ref 10–40)
AST SERPL-CCNC: 25 U/L — SIGNIFICANT CHANGE UP (ref 10–40)
BASE EXCESS BLDV CALC-SCNC: 0.4 MMOL/L — SIGNIFICANT CHANGE UP (ref -2–2)
BASOPHILS # BLD AUTO: 0 K/UL — SIGNIFICANT CHANGE UP (ref 0–0.2)
BILIRUB DIRECT SERPL-MCNC: 0.3 MG/DL — HIGH (ref 0–0.2)
BILIRUB SERPL-MCNC: 1.3 MG/DL — HIGH (ref 0.2–1.2)
BILIRUB SERPL-MCNC: 1.9 MG/DL — HIGH (ref 0.2–1.2)
BILIRUB UR-MCNC: NEGATIVE — SIGNIFICANT CHANGE UP
BUN SERPL-MCNC: 6 MG/DL — LOW (ref 7–23)
BUN SERPL-MCNC: 8 MG/DL — SIGNIFICANT CHANGE UP (ref 7–23)
CA-I SERPL-SCNC: 1.17 MMOL/L — SIGNIFICANT CHANGE UP (ref 1.12–1.3)
CALCIUM SERPL-MCNC: 9.3 MG/DL — SIGNIFICANT CHANGE UP (ref 8.4–10.5)
CALCIUM SERPL-MCNC: 9.3 MG/DL — SIGNIFICANT CHANGE UP (ref 8.4–10.5)
CHLORIDE BLDV-SCNC: 102 MMOL/L — SIGNIFICANT CHANGE UP (ref 96–108)
CHLORIDE SERPL-SCNC: 95 MMOL/L — LOW (ref 96–108)
CHLORIDE SERPL-SCNC: 97 MMOL/L — SIGNIFICANT CHANGE UP (ref 96–108)
CO2 BLDV-SCNC: 25 MMOL/L — SIGNIFICANT CHANGE UP (ref 22–30)
CO2 SERPL-SCNC: 23 MMOL/L — SIGNIFICANT CHANGE UP (ref 22–31)
CO2 SERPL-SCNC: 23 MMOL/L — SIGNIFICANT CHANGE UP (ref 22–31)
COLOR SPEC: YELLOW — SIGNIFICANT CHANGE UP
CREAT SERPL-MCNC: 0.64 MG/DL — SIGNIFICANT CHANGE UP (ref 0.5–1.3)
CREAT SERPL-MCNC: 0.73 MG/DL — SIGNIFICANT CHANGE UP (ref 0.5–1.3)
DIFF PNL FLD: NEGATIVE — SIGNIFICANT CHANGE UP
EOSINOPHIL # BLD AUTO: 0 K/UL — SIGNIFICANT CHANGE UP (ref 0–0.5)
GAS PNL BLDV: 134 MMOL/L — LOW (ref 136–145)
GAS PNL BLDV: SIGNIFICANT CHANGE UP
GLUCOSE BLDV-MCNC: 111 MG/DL — HIGH (ref 70–99)
GLUCOSE SERPL-MCNC: 117 MG/DL — HIGH (ref 70–99)
GLUCOSE SERPL-MCNC: 135 MG/DL — HIGH (ref 70–99)
GLUCOSE UR QL: NEGATIVE — SIGNIFICANT CHANGE UP
GRAM STN FLD: SIGNIFICANT CHANGE UP
HCG SERPL-ACNC: <2 MIU/ML — SIGNIFICANT CHANGE UP
HCO3 BLDV-SCNC: 24 MMOL/L — SIGNIFICANT CHANGE UP (ref 21–29)
HCT VFR BLD CALC: 41.7 % — SIGNIFICANT CHANGE UP (ref 34.5–45)
HCT VFR BLD CALC: 43.3 % — SIGNIFICANT CHANGE UP (ref 34.5–45)
HCT VFR BLDA CALC: 42 % — SIGNIFICANT CHANGE UP (ref 39–50)
HGB BLD CALC-MCNC: 13.5 G/DL — SIGNIFICANT CHANGE UP (ref 11.5–15.5)
HGB BLD-MCNC: 14.1 G/DL — SIGNIFICANT CHANGE UP (ref 11.5–15.5)
HGB BLD-MCNC: 14.7 G/DL — SIGNIFICANT CHANGE UP (ref 11.5–15.5)
KETONES UR-MCNC: NEGATIVE — SIGNIFICANT CHANGE UP
LACTATE BLDV-MCNC: 1.5 MMOL/L — SIGNIFICANT CHANGE UP (ref 0.7–2)
LACTATE SERPL-SCNC: 3.2 MMOL/L — HIGH (ref 0.7–2)
LEUKOCYTE ESTERASE UR-ACNC: NEGATIVE — SIGNIFICANT CHANGE UP
LYMPHOCYTES # BLD AUTO: 1 K/UL — SIGNIFICANT CHANGE UP (ref 1–3.3)
LYMPHOCYTES # BLD AUTO: 10 % — LOW (ref 13–44)
MCHC RBC-ENTMCNC: 29 PG — SIGNIFICANT CHANGE UP (ref 27–34)
MCHC RBC-ENTMCNC: 29.3 PG — SIGNIFICANT CHANGE UP (ref 27–34)
MCHC RBC-ENTMCNC: 33.8 GM/DL — SIGNIFICANT CHANGE UP (ref 32–36)
MCHC RBC-ENTMCNC: 34 GM/DL — SIGNIFICANT CHANGE UP (ref 32–36)
MCV RBC AUTO: 85.8 FL — SIGNIFICANT CHANGE UP (ref 80–100)
MCV RBC AUTO: 86.1 FL — SIGNIFICANT CHANGE UP (ref 80–100)
MONOCYTES # BLD AUTO: 2.1 K/UL — HIGH (ref 0–0.9)
MONOCYTES NFR BLD AUTO: 5 % — SIGNIFICANT CHANGE UP (ref 2–14)
NEUTROPHILS # BLD AUTO: 24.8 K/UL — HIGH (ref 1.8–7.4)
NEUTROPHILS NFR BLD AUTO: 84 % — HIGH (ref 43–77)
NEUTS BAND # BLD: 1 % — SIGNIFICANT CHANGE UP (ref 0–8)
NITRITE UR-MCNC: NEGATIVE — SIGNIFICANT CHANGE UP
PCO2 BLDV: 36 MMHG — SIGNIFICANT CHANGE UP (ref 35–50)
PH BLDV: 7.44 — SIGNIFICANT CHANGE UP (ref 7.35–7.45)
PH UR: 7 — SIGNIFICANT CHANGE UP (ref 5–8)
PLAT MORPH BLD: NORMAL — SIGNIFICANT CHANGE UP
PLATELET # BLD AUTO: 343 K/UL — SIGNIFICANT CHANGE UP (ref 150–400)
PLATELET # BLD AUTO: 389 K/UL — SIGNIFICANT CHANGE UP (ref 150–400)
PO2 BLDV: 34 MMHG — SIGNIFICANT CHANGE UP (ref 25–45)
POTASSIUM BLDV-SCNC: 3.2 MMOL/L — LOW (ref 3.5–5.3)
POTASSIUM SERPL-MCNC: 3.4 MMOL/L — LOW (ref 3.5–5.3)
POTASSIUM SERPL-MCNC: 3.9 MMOL/L — SIGNIFICANT CHANGE UP (ref 3.5–5.3)
POTASSIUM SERPL-SCNC: 3.4 MMOL/L — LOW (ref 3.5–5.3)
POTASSIUM SERPL-SCNC: 3.9 MMOL/L — SIGNIFICANT CHANGE UP (ref 3.5–5.3)
PROT SERPL-MCNC: 8.3 G/DL — SIGNIFICANT CHANGE UP (ref 6–8.3)
PROT SERPL-MCNC: 8.6 G/DL — HIGH (ref 6–8.3)
PROT UR-MCNC: NEGATIVE — SIGNIFICANT CHANGE UP
RAPID RVP RESULT: SIGNIFICANT CHANGE UP
RBC # BLD: 4.86 M/UL — SIGNIFICANT CHANGE UP (ref 3.8–5.2)
RBC # BLD: 5.03 M/UL — SIGNIFICANT CHANGE UP (ref 3.8–5.2)
RBC # FLD: 11.8 % — SIGNIFICANT CHANGE UP (ref 10.3–14.5)
RBC # FLD: 12 % — SIGNIFICANT CHANGE UP (ref 10.3–14.5)
RBC BLD AUTO: SIGNIFICANT CHANGE UP
SAO2 % BLDV: 62 % — LOW (ref 67–88)
SODIUM SERPL-SCNC: 134 MMOL/L — LOW (ref 135–145)
SODIUM SERPL-SCNC: 136 MMOL/L — SIGNIFICANT CHANGE UP (ref 135–145)
SP GR SPEC: 1.01 — LOW (ref 1.01–1.02)
SPECIMEN SOURCE: SIGNIFICANT CHANGE UP
UROBILINOGEN FLD QL: 4 MG/DL
WBC # BLD: 27.9 K/UL — HIGH (ref 3.8–10.5)
WBC # BLD: 29.6 K/UL — HIGH (ref 3.8–10.5)
WBC # FLD AUTO: 27.9 K/UL — HIGH (ref 3.8–10.5)
WBC # FLD AUTO: 29.6 K/UL — HIGH (ref 3.8–10.5)

## 2018-06-14 PROCEDURE — 71046 X-RAY EXAM CHEST 2 VIEWS: CPT | Mod: 26

## 2018-06-14 PROCEDURE — 99285 EMERGENCY DEPT VISIT HI MDM: CPT | Mod: 25

## 2018-06-14 PROCEDURE — 93010 ELECTROCARDIOGRAM REPORT: CPT

## 2018-06-14 RX ORDER — ACETAMINOPHEN 500 MG
650 TABLET ORAL EVERY 6 HOURS
Qty: 0 | Refills: 0 | Status: DISCONTINUED | OUTPATIENT
Start: 2018-06-14 | End: 2018-06-15

## 2018-06-14 RX ORDER — IPRATROPIUM BROMIDE 21 MCG
2 AEROSOL, SPRAY (ML) NASAL DAILY
Qty: 0 | Refills: 0 | Status: DISCONTINUED | OUTPATIENT
Start: 2018-06-14 | End: 2018-06-15

## 2018-06-14 RX ORDER — SIMETHICONE 80 MG/1
80 TABLET, CHEWABLE ORAL EVERY 8 HOURS
Qty: 0 | Refills: 0 | Status: DISCONTINUED | OUTPATIENT
Start: 2018-06-14 | End: 2018-06-15

## 2018-06-14 RX ORDER — ONDANSETRON 8 MG/1
4 TABLET, FILM COATED ORAL EVERY 6 HOURS
Qty: 0 | Refills: 0 | Status: DISCONTINUED | OUTPATIENT
Start: 2018-06-14 | End: 2018-06-15

## 2018-06-14 RX ORDER — IPRATROPIUM/ALBUTEROL SULFATE 18-103MCG
3 AEROSOL WITH ADAPTER (GRAM) INHALATION EVERY 6 HOURS
Qty: 0 | Refills: 0 | Status: DISCONTINUED | OUTPATIENT
Start: 2018-06-14 | End: 2018-06-15

## 2018-06-14 RX ORDER — CEFTRIAXONE 500 MG/1
1 INJECTION, POWDER, FOR SOLUTION INTRAMUSCULAR; INTRAVENOUS EVERY 24 HOURS
Qty: 0 | Refills: 0 | Status: DISCONTINUED | OUTPATIENT
Start: 2018-06-15 | End: 2018-06-15

## 2018-06-14 RX ORDER — FAMOTIDINE 10 MG/ML
20 INJECTION INTRAVENOUS EVERY 12 HOURS
Qty: 0 | Refills: 0 | Status: DISCONTINUED | OUTPATIENT
Start: 2018-06-14 | End: 2018-06-15

## 2018-06-14 RX ORDER — SODIUM CHLORIDE 9 MG/ML
1000 INJECTION INTRAMUSCULAR; INTRAVENOUS; SUBCUTANEOUS
Qty: 0 | Refills: 0 | Status: DISCONTINUED | OUTPATIENT
Start: 2018-06-14 | End: 2018-06-15

## 2018-06-14 RX ORDER — FLUTICASONE PROPIONATE 50 MCG
2 SPRAY, SUSPENSION NASAL DAILY
Qty: 0 | Refills: 0 | Status: DISCONTINUED | OUTPATIENT
Start: 2018-06-14 | End: 2018-06-15

## 2018-06-14 RX ORDER — LEVOTHYROXINE SODIUM 125 MCG
25 TABLET ORAL DAILY
Qty: 0 | Refills: 0 | Status: DISCONTINUED | OUTPATIENT
Start: 2018-06-14 | End: 2018-06-15

## 2018-06-14 RX ORDER — AZITHROMYCIN 500 MG/1
500 TABLET, FILM COATED ORAL ONCE
Qty: 0 | Refills: 0 | Status: COMPLETED | OUTPATIENT
Start: 2018-06-14 | End: 2018-06-14

## 2018-06-14 RX ORDER — IPRATROPIUM BROMIDE 21 MCG
1 AEROSOL, SPRAY (ML) NASAL
Qty: 0 | Refills: 0 | Status: DISCONTINUED | OUTPATIENT
Start: 2018-06-14 | End: 2018-06-14

## 2018-06-14 RX ORDER — ALBUTEROL 90 UG/1
2 AEROSOL, METERED ORAL EVERY 6 HOURS
Qty: 0 | Refills: 0 | Status: DISCONTINUED | OUTPATIENT
Start: 2018-06-14 | End: 2018-06-14

## 2018-06-14 RX ORDER — CEFTRIAXONE 500 MG/1
1 INJECTION, POWDER, FOR SOLUTION INTRAMUSCULAR; INTRAVENOUS ONCE
Qty: 0 | Refills: 0 | Status: COMPLETED | OUTPATIENT
Start: 2018-06-14 | End: 2018-06-14

## 2018-06-14 RX ORDER — AZITHROMYCIN 500 MG/1
500 TABLET, FILM COATED ORAL EVERY 24 HOURS
Qty: 0 | Refills: 0 | Status: DISCONTINUED | OUTPATIENT
Start: 2018-06-15 | End: 2018-06-15

## 2018-06-14 RX ORDER — ONDANSETRON 8 MG/1
4 TABLET, FILM COATED ORAL ONCE
Qty: 0 | Refills: 0 | Status: COMPLETED | OUTPATIENT
Start: 2018-06-14 | End: 2018-06-14

## 2018-06-14 RX ORDER — SODIUM CHLORIDE 9 MG/ML
2000 INJECTION INTRAMUSCULAR; INTRAVENOUS; SUBCUTANEOUS ONCE
Qty: 0 | Refills: 0 | Status: COMPLETED | OUTPATIENT
Start: 2018-06-14 | End: 2018-06-14

## 2018-06-14 RX ORDER — POTASSIUM CHLORIDE 20 MEQ
40 PACKET (EA) ORAL ONCE
Qty: 0 | Refills: 0 | Status: COMPLETED | OUTPATIENT
Start: 2018-06-14 | End: 2018-06-14

## 2018-06-14 RX ADMIN — FAMOTIDINE 20 MILLIGRAM(S): 10 INJECTION INTRAVENOUS at 17:16

## 2018-06-14 RX ADMIN — Medication 40 MILLIEQUIVALENT(S): at 13:37

## 2018-06-14 RX ADMIN — AZITHROMYCIN 250 MILLIGRAM(S): 500 TABLET, FILM COATED ORAL at 12:07

## 2018-06-14 RX ADMIN — SODIUM CHLORIDE 75 MILLILITER(S): 9 INJECTION INTRAMUSCULAR; INTRAVENOUS; SUBCUTANEOUS at 15:31

## 2018-06-14 RX ADMIN — Medication 650 MILLIGRAM(S): at 17:17

## 2018-06-14 RX ADMIN — SIMETHICONE 80 MILLIGRAM(S): 80 TABLET, CHEWABLE ORAL at 17:17

## 2018-06-14 RX ADMIN — SODIUM CHLORIDE 2000 MILLILITER(S): 9 INJECTION INTRAMUSCULAR; INTRAVENOUS; SUBCUTANEOUS at 12:10

## 2018-06-14 RX ADMIN — CEFTRIAXONE 100 GRAM(S): 500 INJECTION, POWDER, FOR SOLUTION INTRAMUSCULAR; INTRAVENOUS at 12:04

## 2018-06-14 RX ADMIN — Medication 200 MILLIGRAM(S): at 22:47

## 2018-06-14 RX ADMIN — ONDANSETRON 4 MILLIGRAM(S): 8 TABLET, FILM COATED ORAL at 12:10

## 2018-06-14 RX ADMIN — ONDANSETRON 4 MILLIGRAM(S): 8 TABLET, FILM COATED ORAL at 13:46

## 2018-06-14 NOTE — H&P ADULT - FAMILY HISTORY
Mother  Still living? Unknown  Family history of hypothyroidism, Age at diagnosis: Age Unknown     Father  Still living? Unknown  Family history of thyroid cancer, Age at diagnosis: Age Unknown     Grandparent  Still living? Unknown  Family history of diabetes mellitus, Age at diagnosis: Age Unknown

## 2018-06-14 NOTE — ED PROVIDER NOTE - MEDICAL DECISION MAKING DETAILS
42 y/o F w/ hx of hypothyroidism and bronchiectasis p/w 4-5 days of fever, weakness, and cough, PE: tachy, hypoxic to 92%, bibasilar rales more on R. sided, diff incl. R. sided pna vs viral exacerbation of chronic lung diseases, will check x-ray, labs, hydration, reassess.

## 2018-06-14 NOTE — H&P ADULT - PROBLEM SELECTOR PLAN 1
WBC 27.9 (84% neutrophils), , reported fever at home to 101.9 F. R middle lobe consolidation on CXR.  - Follow-up BCx, UCx, RVP  - Trend vitals Q4h  - Respiratory support as needed  - C/w CTX/Azithromycin at this time WBC 27.9 (84% neutrophils), , reported fever at home to 101.9 F. R middle lobe consolidation on CXR.  - Follow-up BCx, UCx, RVP  - Trend vitals Q4h  - Respiratory support as needed  - C/w CTX/Azithromycin at this time to cover CAP (suspected source)

## 2018-06-14 NOTE — H&P ADULT - PROBLEM SELECTOR PLAN 5
K 3.4 on admission, repleted  Na 134 on admission, likely dehydration vs SIADH from lung pathology. Will just trend for now.

## 2018-06-14 NOTE — H&P ADULT - PROBLEM SELECTOR PLAN 2
In the setting of bronchiectasis, two prior admissions in 2016 for PNA. BCx grew S. pneumonae at prior admission, treated with Levofloxacin. S/p CTX/Azithro in ED. Comfortable on RA at this time.  - C/w CTX/Azithro  - F/u BCx  - VS q4h  - PRN Guaifenesin  - PRN albuterol In the setting of bronchiectasis, two prior admissions in 2016 for PNA. BCx grew S. pneumonae at prior admission, treated with Levofloxacin. S/p CTX/Azithro in ED. Comfortable on RA at this time.  - C/w CTX/Azithro  - F/u BCx  -check sputum cx  - VS q4h  - PRN Guaifenesin  - PRN albuterol

## 2018-06-14 NOTE — PROVIDER CONTACT NOTE (OTHER) - ASSESSMENT
tylenol 650mg given, BLD cx done in ED, pt on antibiotic tylenol 650mg given, BLD cx done in ED, pt on antibiotic, pt alert

## 2018-06-14 NOTE — H&P ADULT - PROBLEM SELECTOR PLAN 4
T-bili 1.8, lab called to check D-bili. Minor elevation of Alk P to 144. Transaminases wnl. Diffuse abdominal discomfort described as feeling like "gas" w/o tenderness, has nausea.  - Follow-up D-bili  - Trend LFTs  - Consider RUQ US  - Consider Hepatitis panel, EBV, CMV T-bili 1.8, lab called to check D-bili. Minor elevation of Alk P to 144. Transaminases wnl. Diffuse abdominal discomfort described as feeling like "gas" w/o tenderness, has nausea. Reports association with starting Azithromycin PO on empty stomach  - Follow-up D-bili  - Trend LFTs  - Consider RUQ US  - Consider Hepatitis panel, EBV, CMV

## 2018-06-14 NOTE — H&P ADULT - PROBLEM SELECTOR PLAN 3
- PRN Zofran  - IV hydration - likely related to poor po intake, active infection  -PRN Zofran  - IV hydration until better tolerating po

## 2018-06-14 NOTE — H&P ADULT - NSHPSOCIALHISTORY_GEN_ALL_CORE
Denies current or past smoking (Of note, prior H&P does note ~ 10 pack year hx quit 5 yrs prior)  Denies alcohol or drug use.  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

## 2018-06-14 NOTE — H&P ADULT - PROBLEM SELECTOR PLAN 6
Unclear etiology, not actively taking any outpatient treatment, has prior history of 2 hospitalizations for PNA. No hx of intubations.  - Consider pulm consult if clinical status worsens

## 2018-06-14 NOTE — H&P ADULT - NSHPPHYSICALEXAM_GEN_ALL_CORE
.  VITAL SIGNS:  T(C): 36.7 (06-14-18 @ 13:29), Max: 36.7 (06-14-18 @ 10:05)  T(F): 98.1 (06-14-18 @ 13:29), Max: 98.1 (06-14-18 @ 10:05)  HR: 94 (06-14-18 @ 13:29) (94 - 117)  BP: 110/73 (06-14-18 @ 13:29) (105/71 - 113/68)  BP(mean): --  RR: 20 (06-14-18 @ 13:29) (20 - 93)  SpO2: 99% (06-14-18 @ 13:29) (94% - 99%)  Wt(kg): --    PHYSICAL EXAM:    Constitutional: Resting comfortably in bed; NAD  Head: NC/AT  Eyes: PERRL, EOMI, anicteric sclera  ENT: no nasal discharge; uvula midline, no oropharyngeal erythema or exudates; MMM  Neck: supple; no JVD or thyromegaly  Respiratory: Trace diffuse rhonchi with notable rhonchi in lower R lung fields, no retractions  Cardiac: +S1/S2; RRR; no murmur; PMI non-displaced  Gastrointestinal: soft, NT/ND; no rebound or guarding; +BSx4  Back: spine midline, no bony tenderness or step-offs; no CVAT B/L  Extremities: no clubbing or cyanosis; no peripheral edema  Musculoskeletal: NROM x4; no joint swelling, tenderness or erythema  Vascular: 2+ radial, femoral, DP/PT pulses B/L  Dermatologic: skin warm, dry and intact; no rashes, wounds, or scars  Lymphatic: no submandibular or cervical LAD  Neurologic: AAOx3; CNII-XII grossly intact; no focal deficits  Psychiatric: affect and characteristics of appearance, verbalizations, behaviors are appropriate

## 2018-06-14 NOTE — H&P ADULT - ASSESSMENT
43F PMH hypothyroidism, bronchiectasis, GERD, HLD who presents with fever, productive cough, nausea, and headache found to be septic with R middle lobe PNA

## 2018-06-14 NOTE — ED ADULT NURSE NOTE - OBJECTIVE STATEMENT
Patient presents to Ed with c/o weakness. Patient with hx of pneumonia reports experiencing  fever cough and weakness x 5 days with no relief from z-pack given by primary md yesterday.  Patient denies chest pain or sob, +nausea no vomiting fever chills headache or dizziness. +cough  Lungs b/l rales, abd soft non tender, skin warm and dry. Patient presents to Ed with c/o weakness. Patient with hx of pneumonia reports experiencing  fever cough and weakness x 5 days with no relief from z-pack given by primary md yesterday.  Patient denies chest pain or sob, +nausea no vomiting fever chills headache or dizziness. +cough  Lungs b/l rales, abd soft non tender, skin warm and dry. RAC 20g iv lock placed labs drawn and  sent.

## 2018-06-14 NOTE — ED PROVIDER NOTE - OBJECTIVE STATEMENT
44 y/o F w/ hx of hypothyroidism and bronchiectasis p/w 4-5 days of fever, weakness, and cough. Endorses nausea w/out vomiting and abdominal discomfort; started Z pack yesterday, which was prescribed by her primary care physician. 44 y/o F w/ hx of hypothyroidism and bronchiectasis p/w 4-5 days of fever, weakness, and cough. Endorses nausea w/out vomiting and abdominal discomfort; started Z pack yesterday, which was prescribed by her primary care physician.  primary care physician: Wyatt Lynne 42 y/o F w/ hx of hypothyroidism and bronchiectasis p/w 4-5 days of fever, weakness, and cough. Endorses nausea w/out vomiting and abdominal discomfort; started Z pack yesterday, which was prescribed by her primary care physician.  primary care physician: Wyatt Wong:  here with fever and diff breathing productive cough for weeks

## 2018-06-14 NOTE — H&P ADULT - NSHPLABSRESULTS_GEN_ALL_CORE
All labs personally reviewed: WBC 27.9 (84% neutrophils), Hgb 14.1, Plt 343, Na 134, K 3.4, BUN 8, Cr .73. T-bili 1.9, Alk P 141, AST/ALT 24/30. VBG pH 7.44, pCO2 36, pO2 34, HCO3 24, lactate 1.5.    All Imaging personally reviewed: CXR demonstrated R middle lobe PNA    EKG personally reviewed: NSR 97 bpm, axis wnl. QTc 454

## 2018-06-14 NOTE — ED PROVIDER NOTE - ATTENDING CONTRIBUTION TO CARE
Marvin:  I have independently evaluated the patient and have documented in the appropriate sections above.  I agree with the exam and plan as noted above.

## 2018-06-14 NOTE — H&P ADULT - HISTORY OF PRESENT ILLNESS
43F PMH hypothyroidism, bronchiectasis, GERD, HLD who presents with fever, productive cough, nausea, and headache. Her fevers and headache started about 5 days ago, the following day she started having increased cough with yellow/green and sometimes brown sputum. Over the following day she developed nausea w/o vomiting, poor PO, and diffuse abdominal discomfort that she describes as bloating. She went to her PMD a few days ago and was given a Z-pack which she started yesterday. Endorses worsening of symptoms since. She reports Tmax 101.9 at home and intermittent rigors. She reports 2 prior hospital hospitalizations for PNA in 2016, she grew S. pneumonae in her blood culture at last hospitalization, was treated with Levofloxacin. Prior hospitalization was treated with Cefepime, she had a bronchoscopy but reports no etiology ever found for her bronchiectasis. She has not used any recent systemic steroids. She uses nasal sprays for chronic cough but does not use her inhalers which she feels make her symptoms worse.     ED Vitals: T 98.1, , /71, SpO2 94% in ED. Per report she desaturated to 92% but is stable on room air now.  Initial labs: WBC 27.9 (84% neutrophils), Hgb 14.1, Plt 343, Na 134, K 3.4, BUN 8, Cr .73. T-bili 1.9, Alk P 141, AST/ALT 24/30. VBG pH 7.44, pCO2 36, pO2 34, HCO3 24, lactate 1.5.  Initial Imaging: CXR demonstrated R middle lobe PNA    In the ED, the patient received IV Azithromycin/CTX, 2L IV NS, Zofran 4 mg IV. BCx x2, UCx, RVP drawn. 43F PMH hypothyroidism, bronchiectasis, GERD, HLD who presents with fever, productive cough, nausea, and headache. Her fevers and headache started about 5 days ago, the following day she started having increased cough with yellow/green and sometimes brown sputum. Symptoms are similar to when she had pneumonia in the past. Over the following day she developed nausea w/o vomiting, poor PO, and diffuse abdominal discomfort that she describes as bloating. She went to her PMD a few days ago and was given a Z-pack which she started yesterday. Endorses worsening of symptoms since. She reports Tmax 101.9 at home and intermittent rigors. She reports 2 prior hospital hospitalizations for PNA in 2016, she grew S. pneumonae in her blood culture at last hospitalization, was treated with Levofloxacin. Prior hospitalization was treated with Cefepime, she had a bronchoscopy but reports no etiology ever found for her bronchiectasis. She has not used any recent systemic steroids. She uses nasal sprays for chronic cough but does not use her inhalers which she feels make her symptoms worse.     ED Vitals: T 98.1, , /71, SpO2 94% in ED. Per report she desaturated to 92% but is stable on room air now.  Initial labs: WBC 27.9 (84% neutrophils), Hgb 14.1, Plt 343, Na 134, K 3.4, BUN 8, Cr .73. T-bili 1.9, Alk P 141, AST/ALT 24/30. VBG pH 7.44, pCO2 36, pO2 34, HCO3 24, lactate 1.5.  Initial Imaging: CXR demonstrated R middle lobe PNA    In the ED, the patient received IV Azithromycin/CTX, 2L IV NS, Zofran 4 mg IV. BCx x2, UCx, RVP drawn.

## 2018-06-15 ENCOUNTER — TRANSCRIPTION ENCOUNTER (OUTPATIENT)
Age: 43
End: 2018-06-15

## 2018-06-15 VITALS
HEART RATE: 92 BPM | TEMPERATURE: 99 F | SYSTOLIC BLOOD PRESSURE: 110 MMHG | RESPIRATION RATE: 16 BRPM | DIASTOLIC BLOOD PRESSURE: 75 MMHG | OXYGEN SATURATION: 94 %

## 2018-06-15 DIAGNOSIS — Z29.9 ENCOUNTER FOR PROPHYLACTIC MEASURES, UNSPECIFIED: ICD-10-CM

## 2018-06-15 LAB
ALBUMIN SERPL ELPH-MCNC: 3.1 G/DL — LOW (ref 3.3–5)
ALP SERPL-CCNC: 130 U/L — HIGH (ref 40–120)
ALT FLD-CCNC: 25 U/L — SIGNIFICANT CHANGE UP (ref 10–45)
ANION GAP SERPL CALC-SCNC: 13 MMOL/L — SIGNIFICANT CHANGE UP (ref 5–17)
APTT BLD: 36.2 SEC — SIGNIFICANT CHANGE UP (ref 27.5–37.4)
AST SERPL-CCNC: 20 U/L — SIGNIFICANT CHANGE UP (ref 10–40)
BASOPHILS # BLD AUTO: 0.1 K/UL — SIGNIFICANT CHANGE UP (ref 0–0.2)
BASOPHILS NFR BLD AUTO: 0.5 % — SIGNIFICANT CHANGE UP (ref 0–2)
BILIRUB SERPL-MCNC: 0.9 MG/DL — SIGNIFICANT CHANGE UP (ref 0.2–1.2)
BUN SERPL-MCNC: 6 MG/DL — LOW (ref 7–23)
CALCIUM SERPL-MCNC: 8.9 MG/DL — SIGNIFICANT CHANGE UP (ref 8.4–10.5)
CHLORIDE SERPL-SCNC: 99 MMOL/L — SIGNIFICANT CHANGE UP (ref 96–108)
CO2 SERPL-SCNC: 25 MMOL/L — SIGNIFICANT CHANGE UP (ref 22–31)
CREAT SERPL-MCNC: 0.67 MG/DL — SIGNIFICANT CHANGE UP (ref 0.5–1.3)
CULTURE RESULTS: NO GROWTH — SIGNIFICANT CHANGE UP
EOSINOPHIL # BLD AUTO: 0.1 K/UL — SIGNIFICANT CHANGE UP (ref 0–0.5)
EOSINOPHIL NFR BLD AUTO: 0.4 % — SIGNIFICANT CHANGE UP (ref 0–6)
GLUCOSE SERPL-MCNC: 97 MG/DL — SIGNIFICANT CHANGE UP (ref 70–99)
HCT VFR BLD CALC: 39.5 % — SIGNIFICANT CHANGE UP (ref 34.5–45)
HGB BLD-MCNC: 12.9 G/DL — SIGNIFICANT CHANGE UP (ref 11.5–15.5)
INR BLD: 1.43 RATIO — HIGH (ref 0.88–1.16)
LYMPHOCYTES # BLD AUTO: 13.6 % — SIGNIFICANT CHANGE UP (ref 13–44)
LYMPHOCYTES # BLD AUTO: 2.4 K/UL — SIGNIFICANT CHANGE UP (ref 1–3.3)
MAGNESIUM SERPL-MCNC: 2 MG/DL — SIGNIFICANT CHANGE UP (ref 1.6–2.6)
MCHC RBC-ENTMCNC: 28.4 PG — SIGNIFICANT CHANGE UP (ref 27–34)
MCHC RBC-ENTMCNC: 32.6 GM/DL — SIGNIFICANT CHANGE UP (ref 32–36)
MCV RBC AUTO: 87.2 FL — SIGNIFICANT CHANGE UP (ref 80–100)
MONOCYTES # BLD AUTO: 1.2 K/UL — HIGH (ref 0–0.9)
MONOCYTES NFR BLD AUTO: 6.6 % — SIGNIFICANT CHANGE UP (ref 2–14)
NEUTROPHILS # BLD AUTO: 14.1 K/UL — HIGH (ref 1.8–7.4)
NEUTROPHILS NFR BLD AUTO: 79 % — HIGH (ref 43–77)
PHOSPHATE SERPL-MCNC: 2.4 MG/DL — LOW (ref 2.5–4.5)
PLATELET # BLD AUTO: 432 K/UL — HIGH (ref 150–400)
POTASSIUM SERPL-MCNC: 3.7 MMOL/L — SIGNIFICANT CHANGE UP (ref 3.5–5.3)
POTASSIUM SERPL-SCNC: 3.7 MMOL/L — SIGNIFICANT CHANGE UP (ref 3.5–5.3)
PROT SERPL-MCNC: 7.3 G/DL — SIGNIFICANT CHANGE UP (ref 6–8.3)
PROTHROM AB SERPL-ACNC: 15.7 SEC — HIGH (ref 9.8–12.7)
RBC # BLD: 4.53 M/UL — SIGNIFICANT CHANGE UP (ref 3.8–5.2)
RBC # FLD: 12 % — SIGNIFICANT CHANGE UP (ref 10.3–14.5)
SODIUM SERPL-SCNC: 137 MMOL/L — SIGNIFICANT CHANGE UP (ref 135–145)
SPECIMEN SOURCE: SIGNIFICANT CHANGE UP
TSH SERPL-MCNC: 1.51 UIU/ML — SIGNIFICANT CHANGE UP (ref 0.27–4.2)
WBC # BLD: 17.8 K/UL — HIGH (ref 3.8–10.5)
WBC # FLD AUTO: 17.8 K/UL — HIGH (ref 3.8–10.5)

## 2018-06-15 PROCEDURE — 84100 ASSAY OF PHOSPHORUS: CPT

## 2018-06-15 PROCEDURE — 71046 X-RAY EXAM CHEST 2 VIEWS: CPT

## 2018-06-15 PROCEDURE — 84702 CHORIONIC GONADOTROPIN TEST: CPT

## 2018-06-15 PROCEDURE — 85730 THROMBOPLASTIN TIME PARTIAL: CPT

## 2018-06-15 PROCEDURE — 99239 HOSP IP/OBS DSCHRG MGMT >30: CPT

## 2018-06-15 PROCEDURE — 87040 BLOOD CULTURE FOR BACTERIA: CPT

## 2018-06-15 PROCEDURE — 82947 ASSAY GLUCOSE BLOOD QUANT: CPT

## 2018-06-15 PROCEDURE — 82330 ASSAY OF CALCIUM: CPT

## 2018-06-15 PROCEDURE — 82803 BLOOD GASES ANY COMBINATION: CPT

## 2018-06-15 PROCEDURE — 96375 TX/PRO/DX INJ NEW DRUG ADDON: CPT

## 2018-06-15 PROCEDURE — 84443 ASSAY THYROID STIM HORMONE: CPT

## 2018-06-15 PROCEDURE — 82435 ASSAY OF BLOOD CHLORIDE: CPT

## 2018-06-15 PROCEDURE — 85014 HEMATOCRIT: CPT

## 2018-06-15 PROCEDURE — 87486 CHLMYD PNEUM DNA AMP PROBE: CPT

## 2018-06-15 PROCEDURE — 93005 ELECTROCARDIOGRAM TRACING: CPT

## 2018-06-15 PROCEDURE — 80053 COMPREHEN METABOLIC PANEL: CPT

## 2018-06-15 PROCEDURE — 99285 EMERGENCY DEPT VISIT HI MDM: CPT | Mod: 25

## 2018-06-15 PROCEDURE — 83735 ASSAY OF MAGNESIUM: CPT

## 2018-06-15 PROCEDURE — 96374 THER/PROPH/DIAG INJ IV PUSH: CPT

## 2018-06-15 PROCEDURE — 84132 ASSAY OF SERUM POTASSIUM: CPT

## 2018-06-15 PROCEDURE — 81003 URINALYSIS AUTO W/O SCOPE: CPT

## 2018-06-15 PROCEDURE — 87798 DETECT AGENT NOS DNA AMP: CPT

## 2018-06-15 PROCEDURE — 87633 RESP VIRUS 12-25 TARGETS: CPT

## 2018-06-15 PROCEDURE — 85610 PROTHROMBIN TIME: CPT

## 2018-06-15 PROCEDURE — 87086 URINE CULTURE/COLONY COUNT: CPT

## 2018-06-15 PROCEDURE — 83605 ASSAY OF LACTIC ACID: CPT

## 2018-06-15 PROCEDURE — 84295 ASSAY OF SERUM SODIUM: CPT

## 2018-06-15 PROCEDURE — 85027 COMPLETE CBC AUTOMATED: CPT

## 2018-06-15 PROCEDURE — 82248 BILIRUBIN DIRECT: CPT

## 2018-06-15 PROCEDURE — 87581 M.PNEUMON DNA AMP PROBE: CPT

## 2018-06-15 PROCEDURE — 87070 CULTURE OTHR SPECIMN AEROBIC: CPT

## 2018-06-15 RX ORDER — CALAMINE AND ZINC OXIDE AND PHENOL 160; 10 MG/ML; MG/ML
1 LOTION TOPICAL THREE TIMES A DAY
Qty: 0 | Refills: 0 | Status: DISCONTINUED | OUTPATIENT
Start: 2018-06-15 | End: 2018-06-15

## 2018-06-15 RX ORDER — FLUTICASONE PROPIONATE 50 MCG
2 SPRAY, SUSPENSION NASAL DAILY
Qty: 0 | Refills: 0 | Status: DISCONTINUED | OUTPATIENT
Start: 2018-06-15 | End: 2018-06-15

## 2018-06-15 RX ORDER — IPRATROPIUM BROMIDE 21 MCG
2 AEROSOL, SPRAY (ML) NASAL DAILY
Qty: 0 | Refills: 0 | Status: DISCONTINUED | OUTPATIENT
Start: 2018-06-15 | End: 2018-06-15

## 2018-06-15 RX ORDER — MAGNESIUM SULFATE 500 MG/ML
1 VIAL (ML) INJECTION ONCE
Qty: 0 | Refills: 0 | Status: DISCONTINUED | OUTPATIENT
Start: 2018-06-15 | End: 2018-06-15

## 2018-06-15 RX ORDER — SIMETHICONE 80 MG/1
1 TABLET, CHEWABLE ORAL
Qty: 42 | Refills: 0 | OUTPATIENT
Start: 2018-06-15 | End: 2018-06-28

## 2018-06-15 RX ORDER — DIPHENHYDRAMINE HCL 50 MG
1 CAPSULE ORAL
Qty: 15 | Refills: 0 | OUTPATIENT
Start: 2018-06-15 | End: 2018-06-19

## 2018-06-15 RX ORDER — FLUCONAZOLE 150 MG/1
1 TABLET ORAL
Qty: 1 | Refills: 0 | OUTPATIENT
Start: 2018-06-15 | End: 2018-06-15

## 2018-06-15 RX ADMIN — Medication 2 SPRAY(S): at 06:24

## 2018-06-15 RX ADMIN — Medication 25 MICROGRAM(S): at 06:13

## 2018-06-15 RX ADMIN — FAMOTIDINE 20 MILLIGRAM(S): 10 INJECTION INTRAVENOUS at 06:13

## 2018-06-15 NOTE — PROGRESS NOTE ADULT - PROBLEM SELECTOR PLAN 5
Improved.  K 3.4 on admission, repleted  Na 134 on admission, likely dehydration vs SIADH from lung pathology. Will just trend for now. - resolved hypokalemia and hyponatremia in the setting of sepsis, poor PO intake.

## 2018-06-15 NOTE — DISCHARGE NOTE ADULT - MEDICATION SUMMARY - MEDICATIONS TO TAKE
I will START or STAY ON the medications listed below when I get home from the hospital:    Benadryl 25 mg oral capsule  -- 1 cap(s) by mouth every 8 hours PRN for itching and rash. MDD:3  -- May cause drowsiness.  Alcohol may intensify this effect.  Use care when operating dangerous machinery.  Obtain medical advice before taking any non-prescription drugs as some may affect the action of this medication.    -- Indication: For Itching, Rash    Zantac 150 oral tablet  -- 1 tab(s) by mouth 2 times a day  -- Indication: For Reflux    simethicone 80 mg oral tablet, chewable  -- 1 tab(s) by mouth every 8 hours, As needed, Bloating  -- Indication: For Gas Pain    ipratropium 42 mcg/inh (0.06%) nasal spray  -- 2 spray(s) into nose 4 times a day  -- Indication: For Bronchiectasis    fluticasone 50 mcg/inh nasal spray  -- 1 spray(s) into nose once a day  -- Indication: For Rhinitis    levoFLOXacin 500 mg oral tablet  -- 1 tab(s) by mouth once a day   -- Avoid prolonged or excessive exposure to direct and/or artificial sunlight while taking this medication.  Do not take dairy products, antacids, or iron preparations within one hour of this medication.  Finish all this medication unless otherwise directed by prescriber.  May cause drowsiness or dizziness.  Medication should be taken with plenty of water.    -- Indication: For Right lobar pneumonia    Synthroid 25 mcg (0.025 mg) oral tablet  -- 1 tab(s) by mouth once a day  -- Indication: For Hypothyroidism    HYDROcodone-homatropine 5 mg-1.5 mg/5 mL oral syrup  -- 5 milliliter(s) by mouth 3 times a day, As needed, Cough MDD:15 mL  -- Indication: For Cough I will START or STAY ON the medications listed below when I get home from the hospital:    Benadryl 25 mg oral capsule  -- 1 cap(s) by mouth every 8 hours PRN for itching and rash. MDD:3  -- May cause drowsiness.  Alcohol may intensify this effect.  Use care when operating dangerous machinery.  Obtain medical advice before taking any non-prescription drugs as some may affect the action of this medication.    -- Indication: For Itching, Rash    fluconazole 200 mg oral tablet  -- 1 tab(s) by mouth once a day  -- Indication: For yeast infection    Zantac 150 oral tablet  -- 1 tab(s) by mouth 2 times a day  -- Indication: For Reflux    simethicone 80 mg oral tablet, chewable  -- 1 tab(s) by mouth every 8 hours, As needed, Bloating  -- Indication: For Gas Pain    ipratropium 42 mcg/inh (0.06%) nasal spray  -- 2 spray(s) into nose 4 times a day  -- Indication: For Bronchiectasis    fluticasone 50 mcg/inh nasal spray  -- 1 spray(s) into nose once a day  -- Indication: For Rhinitis    levoFLOXacin 500 mg oral tablet  -- 1 tab(s) by mouth once a day   -- Avoid prolonged or excessive exposure to direct and/or artificial sunlight while taking this medication.  Do not take dairy products, antacids, or iron preparations within one hour of this medication.  Finish all this medication unless otherwise directed by prescriber.  May cause drowsiness or dizziness.  Medication should be taken with plenty of water.    -- Indication: For Right lobar pneumonia    Synthroid 25 mcg (0.025 mg) oral tablet  -- 1 tab(s) by mouth once a day  -- Indication: For Hypothyroidism    HYDROcodone-homatropine 5 mg-1.5 mg/5 mL oral syrup  -- 5 milliliter(s) by mouth 3 times a day, As needed, Cough MDD:15 mL  -- Indication: For Cough

## 2018-06-15 NOTE — PROGRESS NOTE ADULT - PROBLEM SELECTOR PLAN 1
4/4 SIRS criteria, middle lobe consolidation on CXR.  - Blood and urine cultures pending, resp culture ngtd, RVP negative.  - Trend vitals Q4h  - Respiratory support as needed; currently saturating well on RA.  - C/w CTX/Azithromycin at this time to cover CAP (suspected source), planned course 5 days pending clinical improvement. 4/4 SIRS criteria, middle lobe consolidation on CXR. Resolved.  - Blood and urine cultures pending, resp culture with normal respiratory maru, RVP negative.  - Trend vitals per routine  - Respiratory support as needed; currently saturating well on RA.  - Change from ceftriaxone/azithromycin to levaquin 500 mg qd for duration total 5 days.

## 2018-06-15 NOTE — PROGRESS NOTE ADULT - ASSESSMENT
43F PMH hypothyroidism, bronchiectasis, GERD, HLD who presents with fever, productive cough, nausea, and headache found to be septic with R middle lobe PNA. 43F PMH hypothyroidism, bronchiectasis, GERD, HLD who presents with fever, productive cough, nausea, and headache found to be septic with R middle lobe PNA, objectively and subjectively improving and converted to oral abx for planned discharge.

## 2018-06-15 NOTE — DISCHARGE NOTE ADULT - CARE PROVIDERS DIRECT ADDRESSES
[email or web address]

## 2018-06-15 NOTE — DISCHARGE NOTE ADULT - CARE PLAN
Principal Discharge DX:	Pneumonia  Goal:	Follow up with your PCP and pulmonologist.  Assessment and plan of treatment:	You were admitted to the hospital with pneumonia on the right lung for which you should continue antibiotics to completion. You should see your established pulmonary doctor after discharge to optimize your bronchiectasis treatment and to further determine why you are having pneumonia so frequently. Your PCP may recommend you see an infectious disease specialist.

## 2018-06-15 NOTE — DISCHARGE NOTE ADULT - MEDICATION SUMMARY - MEDICATIONS TO STOP TAKING
I will STOP taking the medications listed below when I get home from the hospital:    azithromycin 250 mg oral tablet  -- 2 tabs on day 1, 1 dose(s) by mouth once a day starting on day 2  -- Do not take dairy products, antacids, or iron preparations within one hour of this medication.  Finish all this medication unless otherwise directed by prescriber. I will STOP taking the medications listed below when I get home from the hospital:  None

## 2018-06-15 NOTE — PROGRESS NOTE ADULT - PROBLEM SELECTOR PLAN 3
- likely related to poor po intake, active infection  - PRN Zofran  - IV hydration until better tolerating po - resolved, tolerating PO.  - c/w simethicone

## 2018-06-15 NOTE — PROGRESS NOTE ADULT - SUBJECTIVE AND OBJECTIVE BOX
Patient is a 43y old  Female who presents with a chief complaint of Sepsis 2/2 PNA (2018 13:22)      SUBJECTIVE / OVERNIGHT EVENTS:    MEDICATIONS  (STANDING):  azithromycin  IVPB 500 milliGRAM(s) IV Intermittent every 24 hours  calamine Lotion 1 Application(s) Topical three times a day  cefTRIAXone   IVPB 1 Gram(s) IV Intermittent every 24 hours  famotidine    Tablet 20 milliGRAM(s) Oral every 12 hours  fluticasone propionate 50 MICROgram(s)/spray Nasal Spray 2 Spray(s) Both Nostrils daily  ipratropium 42 MICROgram(s) Nasal 2 Spray(s) Nasal daily  levothyroxine 25 MICROGram(s) Oral daily  sodium chloride 0.9%. 1000 milliLiter(s) (75 mL/Hr) IV Continuous <Continuous>    MEDICATIONS  (PRN):  acetaminophen   Tablet 650 milliGRAM(s) Oral every 6 hours PRN For Temp greater than 38.5 C (101.3 F)  ALBUTerol/ipratropium for Nebulization 3 milliLiter(s) Nebulizer every 6 hours PRN Bronchospasm  guaiFENesin    Syrup 200 milliGRAM(s) Oral every 6 hours PRN Cough  ondansetron Injectable 4 milliGRAM(s) IV Push every 6 hours PRN Nausea  simethicone 80 milliGRAM(s) Chew every 8 hours PRN Bloating      CAPILLARY BLOOD GLUCOSE        I&O's Summary    2018 07:01  -  15 Jagdeep 2018 07:00  --------------------------------------------------------  IN: 360 mL / OUT: 300 mL / NET: 60 mL        T(C): 37 (06-15-18 @ 04:52), Max: 38.3 (18 @ 16:54)  HR: 96 (06-15-18 @ 04:52) (94 - 120)  BP: 104/65 (06-15-18 @ 04:52) (103/70 - 113/71)  RR: 18 (06-15-18 @ 04:52) (18 - 93)  SpO2: 94% (06-15-18 @ 04:52) (93% - 99%)    PHYSICAL EXAM:  GENERAL: NAD, well-developed  HEAD:  Atraumatic, Normocephalic  EYES: EOMI, PERRLA, conjunctiva and sclera clear  NECK: Supple, No JVD  CHEST/LUNG: Clear to auscultation bilaterally; No wheeze  HEART: Regular rate and rhythm; No murmurs, rubs, or gallops  ABDOMEN: Soft, Nontender, Nondistended; Bowel sounds present  EXTREMITIES:  2+ Peripheral Pulses, No clubbing, cyanosis, or edema  PSYCH: AAOx3  NEUROLOGY: non-focal  SKIN: No rashes or lesions    LABS:                        12.9   17.8  )-----------( 432      ( 15 Jagdeep 2018 06:30 )             39.5     WBC Trend: 17.8<--, 29.6<--, 27.9<--  06-15    137  |  99  |  6<L>  ----------------------------<  97  3.7   |  25  |  0.67    Ca    8.9      15 Jagdeep 2018 06:28  Phos  2.4     06-15  Mg     2.0     06-15    TPro  7.3  /  Alb  3.1<L>  /  TBili  0.9  /  DBili  x   /  AST  20  /  ALT  25  /  AlkPhos  130<H>  06-15    Creatinine Trend: 0.67<--, 0.64<--, 0.73<--  PT/INR - ( 15 Jagdeep 2018 06:30 )   PT: 15.7 sec;   INR: 1.43 ratio         PTT - ( 15 Jagdepe 2018 06:30 )  PTT:36.2 sec      Urinalysis Basic - ( 2018 11:18 )    Color: Yellow / Appearance: Clear / S.006 / pH: x  Gluc: x / Ketone: Negative  / Bili: Negative / Urobili: 4 mg/dL   Blood: x / Protein: Negative / Nitrite: Negative   Leuk Esterase: Negative / RBC: x / WBC x   Sq Epi: x / Non Sq Epi: x / Bacteria: x          RADIOLOGY & ADDITIONAL TESTS:    Imaging Personally Reviewed:    Consultant(s) Notes Reviewed:      Care Discussed with Consultants/Other Providers: Patient is a 43y old  Female who presents with a chief complaint of Sepsis 2/2 PNA (2018 13:22)    SUBJECTIVE / OVERNIGHT EVENTS:    Overnight patient unable to sleep well and with rash of the forearm. O/N team was called but was unwilling to try benadryl.    Patient has 1 x lesion of right forearm, 2x lesion on the back and 1x on the belly. Started before patient presented to the hospital, initially was attributing to spider or insect bites. Patient is non-toxic appearing and on RA with great subjective improvement. Per hx does have an allergy to penicillin with rash. Overnight with chills and night sweats. Denies chest pain, shortness of breath, abdominal pain, diarrhea.    MEDICATIONS  (STANDING):  azithromycin  IVPB 500 milliGRAM(s) IV Intermittent every 24 hours  calamine Lotion 1 Application(s) Topical three times a day  cefTRIAXone   IVPB 1 Gram(s) IV Intermittent every 24 hours  famotidine    Tablet 20 milliGRAM(s) Oral every 12 hours  fluticasone propionate 50 MICROgram(s)/spray Nasal Spray 2 Spray(s) Both Nostrils daily  ipratropium 42 MICROgram(s) Nasal 2 Spray(s) Nasal daily  levothyroxine 25 MICROGram(s) Oral daily  sodium chloride 0.9%. 1000 milliLiter(s) (75 mL/Hr) IV Continuous <Continuous>    MEDICATIONS  (PRN):  acetaminophen   Tablet 650 milliGRAM(s) Oral every 6 hours PRN For Temp greater than 38.5 C (101.3 F)  ALBUTerol/ipratropium for Nebulization 3 milliLiter(s) Nebulizer every 6 hours PRN Bronchospasm  guaiFENesin    Syrup 200 milliGRAM(s) Oral every 6 hours PRN Cough  ondansetron Injectable 4 milliGRAM(s) IV Push every 6 hours PRN Nausea  simethicone 80 milliGRAM(s) Chew every 8 hours PRN Bloating      CAPILLARY BLOOD GLUCOSE    I&O's Summary    2018 07:01  -  15 Jagdeep 2018 07:00  --------------------------------------------------------  IN: 360 mL / OUT: 300 mL / NET: 60 mL        T(C): 37 (06-15-18 @ 04:52), Max: 38.3 (18 @ 16:54)  HR: 96 (06-15-18 @ 04:52) (94 - 120)  BP: 104/65 (06-15-18 @ 04:52) (103/70 - 113/71)  RR: 18 (06-15-18 @ 04:52) (18 - 93)  SpO2: 94% (06-15-18 @ 04:52) (93% - 99%)    PHYSICAL EXAM:  GENERAL: NAD, well-developed, non-toxic.  HEAD:  Atraumatic, Normocephalic  EYES: EOMI, PERRLA, conjunctiva and sclera clear  NECK: Supple, No JVD  CHEST/LUNG: Clear to auscultation bilaterally; No wheeze  HEART: Regular rate and rhythm; No murmurs, rubs, or gallops  ABDOMEN: Soft, Nontender, Nondistended; Bowel sounds present  EXTREMITIES:  2+ Peripheral Pulses, No clubbing, cyanosis, or edema  PSYCH: AAOx3  NEUROLOGY: non-focal  SKIN: 2 x 2mm papule without fluid collection and 1 cm surrounding erythema, on the anterior forearm and on the abdomen.    LABS:                        12.9   17.8  )-----------( 432      ( 15 Jagdeep 2018 06:30 )             39.5     WBC Trend: 17.8<--, 29.6<--, 27.9<--  0615    137  |  99  |  6<L>  ----------------------------<  97  3.7   |  25  |  0.67    Ca    8.9      15 Jagdeep 2018 06:28  Phos  2.4     15  Mg     2.0     15    TPro  7.3  /  Alb  3.1<L>  /  TBili  0.9  /  DBili  x   /  AST  20  /  ALT  25  /  AlkPhos  130<H>  0615    Creatinine Trend: 0.67<--, 0.64<--, 0.73<--  PT/INR - ( 15 Jagdeep 2018 06:30 )   PT: 15.7 sec;   INR: 1.43 ratio         PTT - ( 15 Jagdeep 2018 06:30 )  PTT:36.2 sec      Urinalysis Basic - ( 2018 11:18 )    Color: Yellow / Appearance: Clear / S.006 / pH: x  Gluc: x / Ketone: Negative  / Bili: Negative / Urobili: 4 mg/dL   Blood: x / Protein: Negative / Nitrite: Negative   Leuk Esterase: Negative / RBC: x / WBC x   Sq Epi: x / Non Sq Epi: x / Bacteria: x          RADIOLOGY & ADDITIONAL TESTS:    Imaging Personally Reviewed:    Consultant(s) Notes Reviewed:      Care Discussed with Consultants/Other Providers:

## 2018-06-15 NOTE — DISCHARGE NOTE ADULT - HOSPITAL COURSE
43F PMH hypothyroidism, bronchiectasis, GERD, HLD who presents with fever, productive cough, nausea, and headache found to be septic with R middle lobe PNA evidenced on CXR. Patient was started on broad spectrum antibiotics after blood and sputum cultures were obtained which remained NGTD and normal respiratory maru on day of discharge. Patient was monitored on 5 mehnaz and remained without need for supportive therapy and was stable on RA and saturating well. Leukocytosis, initially with left shift and 30 downtrending to 17 on day 2 of CTX, azithromycin treatment. Patient remained afebrile without tachycardia or hypotension on day of discharge. She was instructed to take levaquin for 3 days after discharge for a total of 5 day course, and to arrange for close outpatient follow up with PCP and pulmonary medicine.

## 2018-06-15 NOTE — CHART NOTE - NSCHARTNOTEFT_GEN_A_CORE
Called by night nurse - pt is having a rash that  comes and goes  - apparently happens w/ Augmentin, but pt is not on Augmentin - she is getting Ceftriaxone, possibly a cross-reaction?  Hesitant to give 25 Benadryl given that the pt has PNA and her sats were only 93%.

## 2018-06-15 NOTE — PROGRESS NOTE ADULT - PROBLEM SELECTOR PLAN 6
Unclear etiology, not actively taking any outpatient treatment, has prior history of 2 hospitalizations for PNA. No hx of intubations.  - Consider pulm consult if clinical status worsens Unclear etiology, not actively taking any outpatient treatment, has prior history of 2 hospitalizations for PNA. No hx of intubations.  - OP pulmonary follow up.

## 2018-06-15 NOTE — PROGRESS NOTE ADULT - PROBLEM SELECTOR PLAN 9
DVT ppx:     Lance De La Cruz MD  [facility] Internal Medicine   Pager # [phone]/ [phone] DVT: Improve score 0, ambulate as tolerated.    Dispo: Discharge to home with OP follow up. No PT needs.    Lnace De La Cruz MD  [facility] Internal Medicine   Pager # [phone]/ [phone]

## 2018-06-15 NOTE — PROGRESS NOTE ADULT - PROBLEM SELECTOR PLAN 4
T-bili 1.8, lab called to check D-bili. Minor elevation of Alk P to 144. Transaminases wnl. Diffuse abdominal discomfort described as feeling like "gas" w/o tenderness, has nausea. Reports association with starting Azithromycin PO on empty stomach  - D-bili wnl.  - LFTs stable, elevated for age.  - Consider RUQ US  - Consider Hepatitis panel, EBV, CMV - resolved.

## 2018-06-15 NOTE — DISCHARGE NOTE ADULT - CARE PROVIDER_API CALL
Wyatt Lynne), Internal Medicine  [address] [address]  Phone: [phone]  Fax: [phone]    Roque Erazo), Medicine  Pulmonary Medicine  [address] [address]  Phone: [phone]  Fax: [phone]

## 2018-06-15 NOTE — DISCHARGE NOTE ADULT - PATIENT PORTAL LINK FT
You can access the [email or web address] Patient Portal, offered by [facility], by registering with the following website: [email or web address]

## 2018-06-15 NOTE — DISCHARGE NOTE ADULT - PLAN OF CARE
Follow up with your PCP and pulmonologist. You were admitted to the hospital with pneumonia on the right lung for which you should continue antibiotics to completion. You should see your established pulmonary doctor after discharge to optimize your bronchiectasis treatment and to further determine why you are having pneumonia so frequently. Your PCP may recommend you see an infectious disease specialist.

## 2018-06-16 LAB
CULTURE RESULTS: SIGNIFICANT CHANGE UP
SPECIMEN SOURCE: SIGNIFICANT CHANGE UP

## 2018-06-19 LAB
CULTURE RESULTS: SIGNIFICANT CHANGE UP
CULTURE RESULTS: SIGNIFICANT CHANGE UP
SPECIMEN SOURCE: SIGNIFICANT CHANGE UP
SPECIMEN SOURCE: SIGNIFICANT CHANGE UP

## 2018-06-21 ENCOUNTER — APPOINTMENT (OUTPATIENT)
Dept: INTERNAL MEDICINE | Facility: CLINIC | Age: 43
End: 2018-06-21
Payer: COMMERCIAL

## 2018-06-21 VITALS
SYSTOLIC BLOOD PRESSURE: 110 MMHG | TEMPERATURE: 98 F | DIASTOLIC BLOOD PRESSURE: 70 MMHG | OXYGEN SATURATION: 95 % | WEIGHT: 142 LBS | BODY MASS INDEX: 22.82 KG/M2 | HEART RATE: 101 BPM | HEIGHT: 66 IN

## 2018-06-21 VITALS — OXYGEN SATURATION: 96 % | HEART RATE: 95 BPM

## 2018-06-21 LAB
ANION GAP SERPL CALC-SCNC: 16 MMOL/L
BUN SERPL-MCNC: 14 MG/DL
CALCIUM SERPL-MCNC: 9.8 MG/DL
CHLORIDE SERPL-SCNC: 97 MMOL/L
CO2 SERPL-SCNC: 24 MMOL/L
CREAT SERPL-MCNC: 0.79 MG/DL
GLUCOSE SERPL-MCNC: 95 MG/DL
POTASSIUM SERPL-SCNC: 4.1 MMOL/L
SODIUM SERPL-SCNC: 137 MMOL/L

## 2018-06-21 PROCEDURE — 99214 OFFICE O/P EST MOD 30 MIN: CPT

## 2018-06-21 NOTE — PHYSICAL EXAM
[No Acute Distress] : no acute distress [Well Nourished] : well nourished [Well Developed] : well developed [Well-Appearing] : well-appearing [Normal Sclera/Conjunctiva] : normal sclera/conjunctiva [Normal Outer Ear/Nose] : the outer ears and nose were normal in appearance [Normal Oropharynx] : the oropharynx was normal [Normal TMs] : both tympanic membranes were normal [No JVD] : no jugular venous distention [Supple] : supple [No Lymphadenopathy] : no lymphadenopathy [No Respiratory Distress] : no respiratory distress  [No Accessory Muscle Use] : no accessory muscle use [Normal Rate] : normal rate  [Regular Rhythm] : with a regular rhythm [Normal S1, S2] : normal S1 and S2 [Soft] : abdomen soft [Non Tender] : non-tender [Non-distended] : non-distended [No HSM] : no HSM [Normal Bowel Sounds] : normal bowel sounds [Normal Supraclavicular Nodes] : no supraclavicular lymphadenopathy [Normal Axillary Nodes] : no axillary lymphadenopathy [Normal Anterior Cervical Nodes] : no anterior cervical lymphadenopathy [No CVA Tenderness] : no CVA  tenderness [No Spinal Tenderness] : no spinal tenderness [de-identified] : bibasilar crackles right greater than left

## 2018-06-21 NOTE — ASSESSMENT
[FreeTextEntry1] : Patient is a 43-year-old female with history of bronchiectasis/bronchospasm, history of melanoma, prediabetes hypothyroidism, allergic rhinitis who presents for post hospital discharge for pneumonia and persistent fatigue/night sweats\par \par One pneumonia\par Status post Levaquin\par Clinically improved but still feels weak\par Clinically on exam spoke crackles at bases right carotid and left\par Observe\par \par #2 fatigue/night sweats\par Etiology unclear periods are regular\par We'll check thyroid carcinoids rule out field\par Followup in 4 weeks,

## 2018-06-21 NOTE — HISTORY OF PRESENT ILLNESS
[FreeTextEntry1] : Recent hospitalization for pneumonia [de-identified] : Patient is a [age]-year-old female with history of bronchiectasis unclear etiology/bronchospasm, hypothyroidism, allergic reactions of unclear etiology history of melanoma, hypercholesterolemia, night sweats, seasonal allergies, prediabetes  who presents for followup of hospitalization for pneumonia patient was treated with Levaquin notes improvement patient still feels weak and complains of night sweats and fatigue she denies chest pain but notes slight cough and persistent shortness of breath

## 2018-06-26 LAB
BASOPHILS # BLD AUTO: 0.04 K/UL
BASOPHILS NFR BLD AUTO: 0.3 %
EOSINOPHIL # BLD AUTO: 0.21 K/UL
EOSINOPHIL NFR BLD AUTO: 1.5 %
ERYTHROCYTE [SEDIMENTATION RATE] IN BLOOD BY WESTERGREN METHOD: 48 MM/HR
HBA1C MFR BLD HPLC: 5.9 %
HCT VFR BLD CALC: 43.1 %
HGB BLD-MCNC: 13.7 G/DL
IMM GRANULOCYTES NFR BLD AUTO: 0.9 %
LYMPHOCYTES # BLD AUTO: 3.17 K/UL
LYMPHOCYTES NFR BLD AUTO: 23 %
MAN DIFF?: NORMAL
MCHC RBC-ENTMCNC: 27.7 PG
MCHC RBC-ENTMCNC: 31.8 GM/DL
MCV RBC AUTO: 87.1 FL
MONOCYTES # BLD AUTO: 1.11 K/UL
MONOCYTES NFR BLD AUTO: 8 %
NEUTROPHILS # BLD AUTO: 9.16 K/UL
NEUTROPHILS NFR BLD AUTO: 66.3 %
PLATELET # BLD AUTO: 616 K/UL
RBC # BLD: 4.95 M/UL
RBC # FLD: 13.7 %
TSH SERPL-ACNC: 0.51 UIU/ML
WBC # FLD AUTO: 13.81 K/UL

## 2018-06-27 LAB — SEROTONIN SERUM: 72 NG/ML

## 2018-06-29 ENCOUNTER — APPOINTMENT (OUTPATIENT)
Dept: INTERNAL MEDICINE | Facility: CLINIC | Age: 43
End: 2018-06-29

## 2018-06-29 ENCOUNTER — APPOINTMENT (OUTPATIENT)
Dept: INTERNAL MEDICINE | Facility: CLINIC | Age: 43
End: 2018-06-29
Payer: COMMERCIAL

## 2018-06-29 VITALS
WEIGHT: 143 LBS | SYSTOLIC BLOOD PRESSURE: 105 MMHG | HEIGHT: 66 IN | BODY MASS INDEX: 22.98 KG/M2 | OXYGEN SATURATION: 98 % | HEART RATE: 90 BPM | DIASTOLIC BLOOD PRESSURE: 70 MMHG

## 2018-06-29 DIAGNOSIS — Z87.01 PERSONAL HISTORY OF PNEUMONIA (RECURRENT): ICD-10-CM

## 2018-06-29 LAB
CREAT ?TM UR-MCNC: 78.7 MG/DL
DOPAMINE/CREAT UR: 211 MCG/G CR
EPINEPH/CREAT UR: 3 MCG/G CR
EPINEPHRINE/NOEPINEPHRINE CALC TOTAL RAN: 50 MCG/G CR
NOREPINEPH/CREAT UR: 47 MCG/G CR

## 2018-06-29 PROCEDURE — 99214 OFFICE O/P EST MOD 30 MIN: CPT

## 2018-06-29 NOTE — REVIEW OF SYSTEMS
[Fever] : no fever [Chills] : no chills [Fatigue] : fatigue [Night Sweats] : no night sweats [Recent Change In Weight] : ~T no recent weight change [Shortness Of Breath] : no shortness of breath [Wheezing] : wheezing [Cough] : cough [Dyspnea on Exertion] : no dyspnea on exertion [Dysuria] : no dysuria [Incontinence] : incontinence [Nocturia] : no nocturia [Poor Libido] : libido not poor [Hematuria] : no hematuria [Vaginal Discharge] : no vaginal discharge [Dysmenorrhea] : no dysmenorrhea [Negative] : Gastrointestinal

## 2018-06-29 NOTE — PHYSICAL EXAM
[No Acute Distress] : no acute distress [Well Nourished] : well nourished [Well Developed] : well developed [Well-Appearing] : well-appearing [Normal Sclera/Conjunctiva] : normal sclera/conjunctiva [Normal Oropharynx] : the oropharynx was normal [No JVD] : no jugular venous distention [Supple] : supple [No Lymphadenopathy] : no lymphadenopathy [Thyroid Normal, No Nodules] : the thyroid was normal and there were no nodules present [No Respiratory Distress] : no respiratory distress  [Clear to Auscultation] : lungs were clear to auscultation bilaterally [No Accessory Muscle Use] : no accessory muscle use [Normal Rate] : normal rate  [Regular Rhythm] : with a regular rhythm [Normal S1, S2] : normal S1 and S2 [No Murmur] : no murmur heard [No Carotid Bruits] : no carotid bruits [No Abdominal Bruit] : a ~M bruit was not heard ~T in the abdomen [No Edema] : there was no peripheral edema [No Extremity Clubbing/Cyanosis] : no extremity clubbing/cyanosis [Soft] : abdomen soft [Non Tender] : non-tender [Non-distended] : non-distended [No HSM] : no HSM

## 2018-06-29 NOTE — ASSESSMENT
[FreeTextEntry1] : She is a 43-year-old female with history of melanoma, hypothyroidism, bronchiectasis, hypercholesterolemia, prediabetes, seasonal allergies who presents for followup of hospitalization for pneumonia\par \par #1 pneumonia\par Clinically resolved on Levaquin\par Observe\par \par #2 bronchiectasis\par Patient still coughs chronically and feels congested\par Continue Symbicort 2 puffs b.i.d.\par Xopenex as needed\par Zithromax 3 times a week\par Referral to pulmonary rehabilitation finding pulmonologist\par \par #3 thyroid nodule/strong family history of thyroid cancer\par Sonogram done in December 2017\par \par #4 hot flashes\par Thyroids normal, serotonin levels normal, pheochromocytoma ruled out by urine

## 2018-06-29 NOTE — HISTORY OF PRESENT ILLNESS
[FreeTextEntry1] : Followup for pneumonia [de-identified] : Patient is a 43-year-old female with history of bronchiectasis probably secondary to pneumonia, hypothyroidism, hypercholesterolemia, melanoma, reactive airway disease who presents for followup of her hospitalization for pneumonia patient feels well still feels congested but no fever, chills no shortness of breath still coughs productive she still complains of hot flashes and occasional lightheadedness but

## 2018-07-03 ENCOUNTER — RX RENEWAL (OUTPATIENT)
Age: 43
End: 2018-07-03

## 2018-07-16 ENCOUNTER — APPOINTMENT (OUTPATIENT)
Dept: INTERNAL MEDICINE | Facility: CLINIC | Age: 43
End: 2018-07-16
Payer: COMMERCIAL

## 2018-07-16 VITALS
SYSTOLIC BLOOD PRESSURE: 100 MMHG | HEART RATE: 73 BPM | WEIGHT: 145 LBS | OXYGEN SATURATION: 96 % | DIASTOLIC BLOOD PRESSURE: 70 MMHG | BODY MASS INDEX: 23.3 KG/M2 | HEIGHT: 66 IN

## 2018-07-16 PROBLEM — K21.9 GASTRO-ESOPHAGEAL REFLUX DISEASE WITHOUT ESOPHAGITIS: Chronic | Status: ACTIVE | Noted: 2017-08-25

## 2018-07-16 LAB
BASOPHILS # BLD AUTO: 0.06 K/UL
BASOPHILS NFR BLD AUTO: 0.6 %
EOSINOPHIL # BLD AUTO: 0.17 K/UL
EOSINOPHIL NFR BLD AUTO: 1.7 %
HCT VFR BLD CALC: 42.3 %
HGB BLD-MCNC: 14.5 G/DL
IMM GRANULOCYTES NFR BLD AUTO: 0.2 %
LYMPHOCYTES # BLD AUTO: 2.89 K/UL
LYMPHOCYTES NFR BLD AUTO: 28.1 %
MAN DIFF?: NORMAL
MCHC RBC-ENTMCNC: 29.3 PG
MCHC RBC-ENTMCNC: 34.3 GM/DL
MCV RBC AUTO: 85.5 FL
MONOCYTES # BLD AUTO: 0.73 K/UL
MONOCYTES NFR BLD AUTO: 7.1 %
NEUTROPHILS # BLD AUTO: 6.43 K/UL
NEUTROPHILS NFR BLD AUTO: 62.3 %
PLATELET # BLD AUTO: 328 K/UL
RBC # BLD: 4.95 M/UL
RBC # FLD: 13.6 %
WBC # FLD AUTO: 10.3 K/UL

## 2018-07-16 PROCEDURE — 36415 COLL VENOUS BLD VENIPUNCTURE: CPT

## 2018-07-16 PROCEDURE — 99214 OFFICE O/P EST MOD 30 MIN: CPT | Mod: 25

## 2018-07-16 NOTE — REVIEW OF SYSTEMS
[Fever] : no fever [Chills] : no chills [Fatigue] : fatigue [Night Sweats] : no night sweats [Recent Change In Weight] : ~T no recent weight change [Shortness Of Breath] : shortness of breath [Wheezing] : wheezing [Cough] : cough [Negative] : Genitourinary

## 2018-07-16 NOTE — PHYSICAL EXAM
[No Acute Distress] : no acute distress [Well Nourished] : well nourished [Normal Voice/Communication] : normal voice/communication [Normal Sclera/Conjunctiva] : normal sclera/conjunctiva [Normal Outer Ear/Nose] : the outer ears and nose were normal in appearance [Normal Oropharynx] : the oropharynx was normal [Normal TMs] : both tympanic membranes were normal [No JVD] : no jugular venous distention [Supple] : supple [No Lymphadenopathy] : no lymphadenopathy [No Respiratory Distress] : no respiratory distress  [Normal Rate] : normal rate  [Regular Rhythm] : with a regular rhythm [Soft] : abdomen soft [Non Tender] : non-tender [No HSM] : no HSM [Normal Supraclavicular Nodes] : no supraclavicular lymphadenopathy [Normal Axillary Nodes] : no axillary lymphadenopathy [Normal Anterior Cervical Nodes] : no anterior cervical lymphadenopathy [No CVA Tenderness] : no CVA  tenderness [No Spinal Tenderness] : no spinal tenderness [de-identified] : bilaterally  wheezing with bronchial breath sounds no crackles

## 2018-07-16 NOTE — ASSESSMENT
[FreeTextEntry1] : Patient is a 43-year-old female with history of bronchiectasis, recurrent community acquired pneumonia, bronchospasm, history of melanoma, prediabetes, allergic rhinitis who presents for increasing productive cough and shortness of breath\par \par #1 bronchiectasis/bronchospasm\par The patient stopped Zithromax secondary to rash\par Became more short of breath, noncompliant with saline nebulizer\par Start doxycycline 100 b.i.d. for 5 days\par Start prednisone 60 mg x3 days\par Referral to pulmonologist\par Restart antihistamine and saline nebulizer\par \par #2 possible community acquired pneumonia\par Possible allergies to Zithromax, sulfa. Doxycycline 100 b.i.d. for 5 days\par  fails to improve check x-ray\par \par #3 bronchiectasis/chronic cough\par Referral to pulmonologist given several names.

## 2018-07-16 NOTE — HISTORY OF PRESENT ILLNESS
[FreeTextEntry8] : CC: cough and shortness of breath for the past week\par \par HPI: The patient is a 43-year-old female with history of bronchiectasis, pulmonary bronchospasm, chronic cough, history of community acquired ammonia recovered, diltiazem, history of malignant melanoma, prediabetes, seasonal allergies who presents for complaint of increasing shortness of breath and cough denies fever, chills, nausea vomiting but notes shortness of breath patient stopped Zithromax secondary to possible rash denies joint pain,

## 2018-08-13 ENCOUNTER — RX RENEWAL (OUTPATIENT)
Age: 43
End: 2018-08-13

## 2018-08-27 ENCOUNTER — RX RENEWAL (OUTPATIENT)
Age: 43
End: 2018-08-27

## 2018-09-04 ENCOUNTER — RX RENEWAL (OUTPATIENT)
Age: 43
End: 2018-09-04

## 2018-09-23 ENCOUNTER — MEDICATION RENEWAL (OUTPATIENT)
Age: 43
End: 2018-09-23

## 2018-09-25 ENCOUNTER — APPOINTMENT (OUTPATIENT)
Dept: INTERNAL MEDICINE | Facility: CLINIC | Age: 43
End: 2018-09-25
Payer: COMMERCIAL

## 2018-09-25 VITALS
WEIGHT: 145 LBS | HEIGHT: 66 IN | HEART RATE: 83 BPM | OXYGEN SATURATION: 98 % | BODY MASS INDEX: 23.3 KG/M2 | SYSTOLIC BLOOD PRESSURE: 102 MMHG | TEMPERATURE: 98.6 F | DIASTOLIC BLOOD PRESSURE: 70 MMHG

## 2018-09-25 PROCEDURE — 99214 OFFICE O/P EST MOD 30 MIN: CPT

## 2018-09-25 NOTE — REVIEW OF SYSTEMS
[Fever] : no fever [Chills] : chills [Fatigue] : fatigue [Hot Flashes] : hot flashes [Night Sweats] : night sweats [Recent Change In Weight] : ~T no recent weight change [Earache] : no earache [Hearing Loss] : no hearing loss [Nosebleed] : no nosebleeds [Hoarseness] : hoarseness [Nasal Discharge] : nasal discharge [Sore Throat] : no sore throat [Shortness Of Breath] : shortness of breath [Wheezing] : wheezing [Cough] : cough [Dyspnea on Exertion] : dyspnea on exertion [Negative] : Genitourinary

## 2018-09-25 NOTE — ASSESSMENT
[FreeTextEntry1] : Patient is a 43-year-old female with history of bronchiectasis, hypothyroidism, malignant melanoma, prediabetes, hypothyroidism, hypercholesterolemia who presents with acute onset of productive cough and increasing shortness of breath\par \par #1 acute bronchitis\par 2 history of bronchiectasis most likely infectious\par Start Levaquin 500 b.i.d. for 7 days\par Observe\par \par 2 bronchospasm\par Patient reason we'll start a short course of steroids 40 of prednisone for 3 days\par \par #3 bronchiectasis\par We'll refer to pulmonology and chest PT\par F. up in one month

## 2018-09-25 NOTE — HISTORY OF PRESENT ILLNESS
[FreeTextEntry8] : CC: Cough and shortness of breath for the last week\par \par HPI Patient is a 43-year-old female with history of bronchiectasis multiple hospitalizations for infection, hypothyroidism, malignant melanoma, night sweats, seasonal allergies, hypothyroidism, hypercholesterolemia who presents with sudden onset of increasing cough productive greenish, shortness of breath dyspnea exertion denies fever, chills sick contacts children at home

## 2018-09-25 NOTE — PHYSICAL EXAM
[No Acute Distress] : no acute distress [Well Nourished] : well nourished [Well Developed] : well developed [Normal Sclera/Conjunctiva] : normal sclera/conjunctiva [Normal Outer Ear/Nose] : the outer ears and nose were normal in appearance [Normal Oropharynx] : the oropharynx was normal [Normal TMs] : both tympanic membranes were normal [No JVD] : no jugular venous distention [Supple] : supple [No Lymphadenopathy] : no lymphadenopathy [No Respiratory Distress] : no respiratory distress  [No Accessory Muscle Use] : no accessory muscle use [Normal Rate] : normal rate  [Regular Rhythm] : with a regular rhythm [Normal S1, S2] : normal S1 and S2 [Soft] : abdomen soft [Non Tender] : non-tender [Non-distended] : non-distended [No HSM] : no HSM [Normal Bowel Sounds] : normal bowel sounds [No CVA Tenderness] : no CVA  tenderness [No Spinal Tenderness] : no spinal tenderness [de-identified] : Nasal turbinates erythematous full and [de-identified] : Bilateral expiratory wheeze fascicular breath sounds

## 2018-09-27 ENCOUNTER — MEDICATION RENEWAL (OUTPATIENT)
Age: 43
End: 2018-09-27

## 2018-10-10 ENCOUNTER — APPOINTMENT (OUTPATIENT)
Dept: INTERNAL MEDICINE | Facility: CLINIC | Age: 43
End: 2018-10-10
Payer: COMMERCIAL

## 2018-10-10 VITALS
OXYGEN SATURATION: 98 % | BODY MASS INDEX: 23.14 KG/M2 | WEIGHT: 144 LBS | TEMPERATURE: 98.1 F | HEART RATE: 77 BPM | DIASTOLIC BLOOD PRESSURE: 70 MMHG | HEIGHT: 66 IN | SYSTOLIC BLOOD PRESSURE: 120 MMHG

## 2018-10-10 PROCEDURE — G0008: CPT

## 2018-10-10 PROCEDURE — 99214 OFFICE O/P EST MOD 30 MIN: CPT | Mod: 25

## 2018-10-10 PROCEDURE — 90686 IIV4 VACC NO PRSV 0.5 ML IM: CPT

## 2018-10-10 NOTE — ASSESSMENT
[FreeTextEntry1] : Patient is a 43-year-old female with history of bronchiectasis, melanoma, hypothyroidism, allergic rhinitis who presents for followup of bronchiectasis with increasing cough and\par \par #1 bronchiectasis\par Continue Symbicort and albuterol\par Referral to physical therapy pulmonary\par Referral to pulmonology\par Start Levaquin 500 q. day for one week\par Observe\par \par #2 health maintenance\par Influenza vaccine to

## 2018-10-10 NOTE — PHYSICAL EXAM
[No Acute Distress] : no acute distress [Well Nourished] : well nourished [Well Developed] : well developed [Well-Appearing] : well-appearing [No JVD] : no jugular venous distention [Supple] : supple [No Lymphadenopathy] : no lymphadenopathy [No Respiratory Distress] : no respiratory distress  [Clear to Auscultation] : lungs were clear to auscultation bilaterally [No Accessory Muscle Use] : no accessory muscle use [Normal Rate] : normal rate  [Regular Rhythm] : with a regular rhythm [Normal S1, S2] : normal S1 and S2 [Soft] : abdomen soft [Non Tender] : non-tender [No HSM] : no HSM [Normal Supraclavicular Nodes] : no supraclavicular lymphadenopathy [Normal Axillary Nodes] : no axillary lymphadenopathy [Normal Anterior Cervical Nodes] : no anterior cervical lymphadenopathy [No CVA Tenderness] : no CVA  tenderness [No Spinal Tenderness] : no spinal tenderness

## 2018-10-10 NOTE — HISTORY OF PRESENT ILLNESS
[FreeTextEntry1] : Followup for bronchiectasis [de-identified] : The patient is a 43 -year-old female with history of bronchiectasis, hypothyroidism, melanoma, prediabetes who presents for followup patient notes recent bouts of bronchospasm/increased mucus production requiring her to use nebulizer feeling short of breath she denies chest pain palpitation, fever chills feels better today

## 2018-10-10 NOTE — REVIEW OF SYSTEMS
[Shortness Of Breath] : shortness of breath [Wheezing] : wheezing [Cough] : cough [Negative] : Gastrointestinal [Chest Pain] : no chest pain [Palpitations] : no palpitations [Leg Claudication] : no leg claudication [Orthopnea] : no orthopnea [Paroysmal Nocturnal Dyspnea] : no paroysmal nocturnal dyspnea

## 2018-11-01 ENCOUNTER — APPOINTMENT (OUTPATIENT)
Dept: INTERNAL MEDICINE | Facility: CLINIC | Age: 43
End: 2018-11-01
Payer: COMMERCIAL

## 2018-11-01 VITALS
HEIGHT: 66 IN | OXYGEN SATURATION: 97 % | DIASTOLIC BLOOD PRESSURE: 70 MMHG | BODY MASS INDEX: 23.14 KG/M2 | SYSTOLIC BLOOD PRESSURE: 110 MMHG | WEIGHT: 144 LBS | TEMPERATURE: 98.4 F | HEART RATE: 75 BPM

## 2018-11-01 PROCEDURE — 99214 OFFICE O/P EST MOD 30 MIN: CPT | Mod: 25

## 2018-11-01 NOTE — HISTORY OF PRESENT ILLNESS
[FreeTextEntry8] : CC: Shortness of breath cough nasal congestion\par \par HPI: Patient is a 43-year-old female with history of bronchiectasis/bronchospasm, history of malignant melanoma, hypothyroidism subclinical, hypercholesterolemia, hot flashes, prediabetes seasonal allergies seborrheic dermatitis who presents for increasing shortness of breath cough and nasal congestion sore throat started several days ago with increasing coughing congestion and now shortness of breath denies fever, chills, sick contacts

## 2018-11-01 NOTE — ASSESSMENT
[FreeTextEntry1] : Patient is a 43-year-old female with history of bronchospasm/bronchiectasis, hypothyroidism, history of melanoma, prediabetes, who presents for increasing shortness of breath/productive cough/shortness of breath\par \par 1.  Increasing shortness of breath productive cough\par Most likely rep represents worsening bronchial bronchiectasis/bronchospasm\par Continue Symbicort\par Restart albuterol Ventolin inhaler\par Nasal saline spray\par Refer for pulmonary rehab to pulmonary\par No antibiotics or steroids at present time

## 2018-11-01 NOTE — PHYSICAL EXAM
[No Acute Distress] : no acute distress [Well Nourished] : well nourished [Well Developed] : well developed [Well-Appearing] : well-appearing [Normal Voice/Communication] : normal voice/communication [Normal Outer Ear/Nose] : the outer ears and nose were normal in appearance [Normal Oropharynx] : the oropharynx was normal [No JVD] : no jugular venous distention [Supple] : supple [No Lymphadenopathy] : no lymphadenopathy [Normal Rate] : normal rate  [Regular Rhythm] : with a regular rhythm [Normal S1, S2] : normal S1 and S2 [Soft] : abdomen soft [Non Tender] : non-tender [No HSM] : no HSM [Normal Bowel Sounds] : normal bowel sounds [No CVA Tenderness] : no CVA  tenderness [No Spinal Tenderness] : no spinal tenderness [Speech Grossly Normal] : speech grossly normal [Memory Grossly Normal] : memory grossly normal [Normal Affect] : the affect was normal [Alert and Oriented x3] : oriented to person, place, and time [Normal Mood] : the mood was normal [Normal Insight/Judgement] : insight and judgment were intact [de-identified] : Nasal turbinates erythematous clear mucus [de-identified] : Bilateral wheezing crackles at bases

## 2018-11-01 NOTE — REVIEW OF SYSTEMS
[Night Sweats] : night sweats [Earache] : earache [Nasal Discharge] : nasal discharge [Postnasal Drip] : postnasal drip [Shortness Of Breath] : shortness of breath [Wheezing] : wheezing [Cough] : cough [Dyspnea on Exertion] : dyspnea on exertion [Negative] : Genitourinary [Fever] : no fever [Chills] : no chills [Fatigue] : no fatigue [Hot Flashes] : no hot flashes [Recent Change In Weight] : ~T no recent weight change [Hearing Loss] : no hearing loss [Nosebleed] : no nosebleeds [Hoarseness] : no hoarseness [Sore Throat] : no sore throat

## 2018-11-01 NOTE — HEALTH RISK ASSESSMENT
[No falls in past year] : Patient reported no falls in the past year [0] : 2) Feeling down, depressed, or hopeless: Not at all (0) [] : No [de-identified] : social

## 2018-11-06 ENCOUNTER — MEDICATION RENEWAL (OUTPATIENT)
Age: 43
End: 2018-11-06

## 2018-11-06 RX ORDER — AMOXICILLIN AND CLAVULANATE POTASSIUM 875; 125 MG/1; MG/1
875-125 TABLET, COATED ORAL TWICE DAILY
Qty: 14 | Refills: 0 | Status: COMPLETED | COMMUNITY
Start: 2018-11-06 | End: 2018-11-13

## 2018-11-07 ENCOUNTER — MEDICATION RENEWAL (OUTPATIENT)
Age: 43
End: 2018-11-07

## 2018-11-19 ENCOUNTER — RX RENEWAL (OUTPATIENT)
Age: 43
End: 2018-11-19

## 2018-11-20 ENCOUNTER — RX RENEWAL (OUTPATIENT)
Age: 43
End: 2018-11-20

## 2018-11-23 ENCOUNTER — RX RENEWAL (OUTPATIENT)
Age: 43
End: 2018-11-23

## 2018-11-29 ENCOUNTER — RX RENEWAL (OUTPATIENT)
Age: 43
End: 2018-11-29

## 2018-12-07 ENCOUNTER — RX RENEWAL (OUTPATIENT)
Age: 43
End: 2018-12-07

## 2018-12-31 ENCOUNTER — APPOINTMENT (OUTPATIENT)
Dept: INTERNAL MEDICINE | Facility: CLINIC | Age: 43
End: 2018-12-31
Payer: COMMERCIAL

## 2018-12-31 VITALS
OXYGEN SATURATION: 97 % | TEMPERATURE: 97.9 F | HEART RATE: 105 BPM | HEIGHT: 66 IN | DIASTOLIC BLOOD PRESSURE: 58 MMHG | SYSTOLIC BLOOD PRESSURE: 98 MMHG | BODY MASS INDEX: 22.82 KG/M2 | WEIGHT: 142 LBS

## 2018-12-31 PROCEDURE — 99214 OFFICE O/P EST MOD 30 MIN: CPT

## 2018-12-31 RX ORDER — LEVOFLOXACIN 500 MG/1
500 TABLET, FILM COATED ORAL DAILY
Qty: 7 | Refills: 0 | Status: DISCONTINUED | COMMUNITY
Start: 2018-09-23 | End: 2018-12-31

## 2018-12-31 RX ORDER — LEVOFLOXACIN 500 MG/1
500 TABLET, FILM COATED ORAL DAILY
Qty: 7 | Refills: 0 | Status: DISCONTINUED | COMMUNITY
Start: 2018-10-10 | End: 2018-12-31

## 2018-12-31 RX ORDER — PREDNISONE 20 MG/1
20 TABLET ORAL DAILY
Qty: 9 | Refills: 0 | Status: DISCONTINUED | COMMUNITY
Start: 2018-07-16 | End: 2018-12-31

## 2018-12-31 RX ORDER — DOXYCYCLINE HYCLATE 100 MG/1
100 TABLET ORAL
Qty: 10 | Refills: 0 | Status: DISCONTINUED | COMMUNITY
Start: 2018-07-16 | End: 2018-12-31

## 2018-12-31 RX ORDER — AZITHROMYCIN 250 MG/1
250 TABLET, FILM COATED ORAL
Qty: 1 | Refills: 0 | Status: DISCONTINUED | COMMUNITY
Start: 2018-06-11 | End: 2018-12-31

## 2018-12-31 RX ORDER — CLINDAMYCIN HYDROCHLORIDE 150 MG/1
150 CAPSULE ORAL EVERY 8 HOURS
Qty: 21 | Refills: 0 | Status: DISCONTINUED | COMMUNITY
Start: 2018-08-14 | End: 2018-12-31

## 2018-12-31 RX ORDER — CLINDAMYCIN PHOSPHATE 10 MG/ML
1 LOTION TOPICAL
Qty: 1 | Refills: 2 | Status: DISCONTINUED | COMMUNITY
Start: 2017-04-27 | End: 2018-12-31

## 2018-12-31 NOTE — REVIEW OF SYSTEMS
[Fever] : no fever [Chills] : chills [Fatigue] : no fatigue [Chest Pain] : no chest pain [Skin Rash] : no skin rash [Dizziness] : no dizziness [Fainting] : no fainting

## 2018-12-31 NOTE — ASSESSMENT
[FreeTextEntry1] : 43F w/ bronchiectasis, h/o melanoma, hypothyroidism, allergic rhinitis here for acute visit. \par \par diarrhea - 3 weeks, progressive, \par - concerning for infectious cause considering duration, progression and possible "chills"? and pus in stool, h/o taking so many courses of abx -> will send ova+parasites, stool culture, c diff \par - BMP, CBC today\par - discussed slightly elevated HR and low BP, patient to get pedialyte now, advised if unable to take PO or feeling lightheaded to go to ED for fluids.

## 2018-12-31 NOTE — HISTORY OF PRESENT ILLNESS
[FreeTextEntry8] : 43F w/ bronchiectasis, h/o melanoma, hypothyroidism, allergic rhinitis here for acute visit. \par \par Reports diarrhea starting 3 weeks ago. Progressively worse over the last three weeks. No recent travel. No significant diet or medication change. Now watery diarrhea with blood. Has internal and external hemorrhoids, so has intermittent BRBPR. Has gone from her normal which is 1x/week, to 3x/day at the start to now every 2 hours. No fever, noting fatigue though. Muscus x 1-2 times. \par \par Not taking any steroids every day. Only taking Synthroid and zyrtex every day. Was taking azithromycin but developed allergy a few months ago.

## 2018-12-31 NOTE — PHYSICAL EXAM
[No Acute Distress] : no acute distress [Well Nourished] : well nourished [Well Developed] : well developed [Well-Appearing] : well-appearing [Normal Sclera/Conjunctiva] : normal sclera/conjunctiva [Soft] : abdomen soft [Non Tender] : non-tender [Non-distended] : non-distended [No Masses] : no abdominal mass palpated [No HSM] : no HSM [Normal Bowel Sounds] : normal bowel sounds [No Hernias] : no hernias [Normal Insight/Judgement] : insight and judgment were intact

## 2019-01-01 LAB
C DIFF TOX GENS STL QL NAA+PROBE: NORMAL
CDIFF BY PCR: NOT DETECTED

## 2019-01-02 ENCOUNTER — LABORATORY RESULT (OUTPATIENT)
Age: 44
End: 2019-01-02

## 2019-01-02 LAB
ANION GAP SERPL CALC-SCNC: 11 MMOL/L
BUN SERPL-MCNC: 5 MG/DL
CALCIUM SERPL-MCNC: 9.5 MG/DL
CHLORIDE SERPL-SCNC: 102 MMOL/L
CO2 SERPL-SCNC: 25 MMOL/L
CREAT SERPL-MCNC: 0.76 MG/DL
DEPRECATED O AND P PREP STL: NORMAL
GLUCOSE SERPL-MCNC: 80 MG/DL
POTASSIUM SERPL-SCNC: 3.9 MMOL/L
SODIUM SERPL-SCNC: 138 MMOL/L

## 2019-01-03 LAB
BASOPHILS # BLD AUTO: 0.07 K/UL
BASOPHILS NFR BLD AUTO: 0.5 %
EOSINOPHIL # BLD AUTO: 0.71 K/UL
EOSINOPHIL NFR BLD AUTO: 5 %
HCT VFR BLD CALC: 41.6 %
HGB BLD-MCNC: 13.4 G/DL
IMM GRANULOCYTES NFR BLD AUTO: 0.3 %
LYMPHOCYTES # BLD AUTO: 2.64 K/UL
LYMPHOCYTES NFR BLD AUTO: 18.4 %
MAN DIFF?: NORMAL
MCHC RBC-ENTMCNC: 28.5 PG
MCHC RBC-ENTMCNC: 32.2 GM/DL
MCV RBC AUTO: 88.5 FL
MONOCYTES # BLD AUTO: 1.37 K/UL
MONOCYTES NFR BLD AUTO: 9.6 %
NEUTROPHILS # BLD AUTO: 9.5 K/UL
NEUTROPHILS NFR BLD AUTO: 66.2 %
PLATELET # BLD AUTO: 393 K/UL
RBC # BLD: 4.7 M/UL
RBC # FLD: 14.4 %
WBC # FLD AUTO: 14.33 K/UL

## 2019-01-04 ENCOUNTER — OUTPATIENT (OUTPATIENT)
Dept: OUTPATIENT SERVICES | Facility: HOSPITAL | Age: 44
LOS: 1 days | End: 2019-01-04
Payer: COMMERCIAL

## 2019-01-04 ENCOUNTER — APPOINTMENT (OUTPATIENT)
Dept: CT IMAGING | Facility: CLINIC | Age: 44
End: 2019-01-04
Payer: COMMERCIAL

## 2019-01-04 DIAGNOSIS — C43.71 MALIGNANT MELANOMA OF RIGHT LOWER LIMB, INCLUDING HIP: Chronic | ICD-10-CM

## 2019-01-04 DIAGNOSIS — Z90.89 ACQUIRED ABSENCE OF OTHER ORGANS: Chronic | ICD-10-CM

## 2019-01-04 DIAGNOSIS — Z00.8 ENCOUNTER FOR OTHER GENERAL EXAMINATION: ICD-10-CM

## 2019-01-04 DIAGNOSIS — Z98.89 OTHER SPECIFIED POSTPROCEDURAL STATES: Chronic | ICD-10-CM

## 2019-01-04 LAB — BACTERIA STL CULT: NORMAL

## 2019-01-04 PROCEDURE — 74177 CT ABD & PELVIS W/CONTRAST: CPT

## 2019-01-04 PROCEDURE — 74177 CT ABD & PELVIS W/CONTRAST: CPT | Mod: 26

## 2019-01-08 LAB
DEPRECATED O AND P PREP STL: NORMAL
DEPRECATED O AND P PREP STL: NORMAL

## 2019-01-11 ENCOUNTER — RX RENEWAL (OUTPATIENT)
Age: 44
End: 2019-01-11

## 2019-01-11 ENCOUNTER — INPATIENT (INPATIENT)
Facility: HOSPITAL | Age: 44
LOS: 5 days | Discharge: HOME | DRG: 387 | End: 2019-01-17
Attending: INTERNAL MEDICINE | Admitting: HOSPITALIST
Payer: COMMERCIAL

## 2019-01-11 VITALS
TEMPERATURE: 98 F | WEIGHT: 136.03 LBS | HEART RATE: 102 BPM | DIASTOLIC BLOOD PRESSURE: 82 MMHG | HEIGHT: 66 IN | RESPIRATION RATE: 20 BRPM | SYSTOLIC BLOOD PRESSURE: 122 MMHG

## 2019-01-11 DIAGNOSIS — Z29.9 ENCOUNTER FOR PROPHYLACTIC MEASURES, UNSPECIFIED: ICD-10-CM

## 2019-01-11 DIAGNOSIS — Z98.89 OTHER SPECIFIED POSTPROCEDURAL STATES: Chronic | ICD-10-CM

## 2019-01-11 DIAGNOSIS — Z90.89 ACQUIRED ABSENCE OF OTHER ORGANS: Chronic | ICD-10-CM

## 2019-01-11 DIAGNOSIS — K51.90 ULCERATIVE COLITIS, UNSPECIFIED, WITHOUT COMPLICATIONS: ICD-10-CM

## 2019-01-11 DIAGNOSIS — C43.71 MALIGNANT MELANOMA OF RIGHT LOWER LIMB, INCLUDING HIP: Chronic | ICD-10-CM

## 2019-01-11 DIAGNOSIS — R09.89 OTHER SPECIFIED SYMPTOMS AND SIGNS INVOLVING THE CIRCULATORY AND RESPIRATORY SYSTEMS: ICD-10-CM

## 2019-01-11 DIAGNOSIS — J47.9 BRONCHIECTASIS, UNCOMPLICATED: ICD-10-CM

## 2019-01-11 DIAGNOSIS — K21.9 GASTRO-ESOPHAGEAL REFLUX DISEASE WITHOUT ESOPHAGITIS: ICD-10-CM

## 2019-01-11 DIAGNOSIS — E03.9 HYPOTHYROIDISM, UNSPECIFIED: ICD-10-CM

## 2019-01-11 DIAGNOSIS — K51.919 ULCERATIVE COLITIS, UNSPECIFIED WITH UNSPECIFIED COMPLICATIONS: ICD-10-CM

## 2019-01-11 LAB
ALBUMIN SERPL ELPH-MCNC: 4 G/DL — SIGNIFICANT CHANGE UP (ref 3.3–5)
ALP SERPL-CCNC: 71 U/L — SIGNIFICANT CHANGE UP (ref 40–120)
ALT FLD-CCNC: 9 U/L — LOW (ref 10–45)
ANION GAP SERPL CALC-SCNC: 16 MMOL/L — SIGNIFICANT CHANGE UP (ref 5–17)
APPEARANCE UR: ABNORMAL
APTT BLD: 37.1 SEC — HIGH (ref 27.5–36.3)
AST SERPL-CCNC: 12 U/L — SIGNIFICANT CHANGE UP (ref 10–40)
BACTERIA # UR AUTO: ABNORMAL
BASOPHILS # BLD AUTO: 0.1 K/UL — SIGNIFICANT CHANGE UP (ref 0–0.2)
BASOPHILS NFR BLD AUTO: 0.6 % — SIGNIFICANT CHANGE UP (ref 0–2)
BILIRUB SERPL-MCNC: 0.7 MG/DL — SIGNIFICANT CHANGE UP (ref 0.2–1.2)
BILIRUB UR-MCNC: NEGATIVE — SIGNIFICANT CHANGE UP
BUN SERPL-MCNC: 6 MG/DL — LOW (ref 7–23)
CALCIUM SERPL-MCNC: 9.1 MG/DL — SIGNIFICANT CHANGE UP (ref 8.4–10.5)
CHLORIDE SERPL-SCNC: 99 MMOL/L — SIGNIFICANT CHANGE UP (ref 96–108)
CO2 SERPL-SCNC: 24 MMOL/L — SIGNIFICANT CHANGE UP (ref 22–31)
COLOR SPEC: YELLOW — SIGNIFICANT CHANGE UP
CREAT SERPL-MCNC: 0.75 MG/DL — SIGNIFICANT CHANGE UP (ref 0.5–1.3)
DIFF PNL FLD: NEGATIVE — SIGNIFICANT CHANGE UP
EOSINOPHIL # BLD AUTO: 0.6 K/UL — HIGH (ref 0–0.5)
EOSINOPHIL NFR BLD AUTO: 3.4 % — SIGNIFICANT CHANGE UP (ref 0–6)
EPI CELLS # UR: 29 /HPF — HIGH
GAS PNL BLDV: SIGNIFICANT CHANGE UP
GLUCOSE SERPL-MCNC: 100 MG/DL — HIGH (ref 70–99)
GLUCOSE UR QL: NEGATIVE — SIGNIFICANT CHANGE UP
HCG SERPL-ACNC: <2 MIU/ML — SIGNIFICANT CHANGE UP
HCT VFR BLD CALC: 37.5 % — SIGNIFICANT CHANGE UP (ref 34.5–45)
HGB BLD-MCNC: 12.9 G/DL — SIGNIFICANT CHANGE UP (ref 11.5–15.5)
HYALINE CASTS # UR AUTO: 0 /LPF — SIGNIFICANT CHANGE UP (ref 0–7)
INR BLD: 1.05 RATIO — SIGNIFICANT CHANGE UP (ref 0.88–1.16)
KETONES UR-MCNC: NEGATIVE — SIGNIFICANT CHANGE UP
LEUKOCYTE ESTERASE UR-ACNC: NEGATIVE — SIGNIFICANT CHANGE UP
LIDOCAIN IGE QN: 17 U/L — SIGNIFICANT CHANGE UP (ref 7–60)
LYMPHOCYTES # BLD AUTO: 18 % — SIGNIFICANT CHANGE UP (ref 13–44)
LYMPHOCYTES # BLD AUTO: 3.3 K/UL — SIGNIFICANT CHANGE UP (ref 1–3.3)
MAGNESIUM SERPL-MCNC: 1.9 MG/DL — SIGNIFICANT CHANGE UP (ref 1.6–2.6)
MCHC RBC-ENTMCNC: 29.6 PG — SIGNIFICANT CHANGE UP (ref 27–34)
MCHC RBC-ENTMCNC: 34.5 GM/DL — SIGNIFICANT CHANGE UP (ref 32–36)
MCV RBC AUTO: 85.7 FL — SIGNIFICANT CHANGE UP (ref 80–100)
MONOCYTES # BLD AUTO: 1.2 K/UL — HIGH (ref 0–0.9)
MONOCYTES NFR BLD AUTO: 6.3 % — SIGNIFICANT CHANGE UP (ref 2–14)
NEUTROPHILS # BLD AUTO: 13.2 K/UL — HIGH (ref 1.8–7.4)
NEUTROPHILS NFR BLD AUTO: 71.7 % — SIGNIFICANT CHANGE UP (ref 43–77)
NITRITE UR-MCNC: NEGATIVE — SIGNIFICANT CHANGE UP
PH UR: 6 — SIGNIFICANT CHANGE UP (ref 5–8)
PHOSPHATE SERPL-MCNC: 3.6 MG/DL — SIGNIFICANT CHANGE UP (ref 2.5–4.5)
PLATELET # BLD AUTO: 483 K/UL — HIGH (ref 150–400)
POTASSIUM SERPL-MCNC: 3.8 MMOL/L — SIGNIFICANT CHANGE UP (ref 3.5–5.3)
POTASSIUM SERPL-SCNC: 3.8 MMOL/L — SIGNIFICANT CHANGE UP (ref 3.5–5.3)
PROT SERPL-MCNC: 8.4 G/DL — HIGH (ref 6–8.3)
PROT UR-MCNC: ABNORMAL
PROTHROM AB SERPL-ACNC: 12 SEC — SIGNIFICANT CHANGE UP (ref 10–12.9)
RBC # BLD: 4.37 M/UL — SIGNIFICANT CHANGE UP (ref 3.8–5.2)
RBC # FLD: 12.5 % — SIGNIFICANT CHANGE UP (ref 10.3–14.5)
RBC CASTS # UR COMP ASSIST: 3 /HPF — SIGNIFICANT CHANGE UP (ref 0–4)
SODIUM SERPL-SCNC: 139 MMOL/L — SIGNIFICANT CHANGE UP (ref 135–145)
SP GR SPEC: 1.03 — HIGH (ref 1.01–1.02)
UROBILINOGEN FLD QL: NEGATIVE — SIGNIFICANT CHANGE UP
WBC # BLD: 18.4 K/UL — HIGH (ref 3.8–10.5)
WBC # FLD AUTO: 18.4 K/UL — HIGH (ref 3.8–10.5)
WBC UR QL: 4 /HPF — SIGNIFICANT CHANGE UP (ref 0–5)

## 2019-01-11 PROCEDURE — 99285 EMERGENCY DEPT VISIT HI MDM: CPT

## 2019-01-11 PROCEDURE — 99223 1ST HOSP IP/OBS HIGH 75: CPT

## 2019-01-11 RX ORDER — HYDROCORTISONE 20 MG
100 TABLET ORAL EVERY 8 HOURS
Qty: 0 | Refills: 0 | Status: DISCONTINUED | OUTPATIENT
Start: 2019-01-11 | End: 2019-01-11

## 2019-01-11 RX ORDER — LEVOTHYROXINE SODIUM 125 MCG
25 TABLET ORAL DAILY
Qty: 0 | Refills: 0 | Status: DISCONTINUED | OUTPATIENT
Start: 2019-01-11 | End: 2019-01-17

## 2019-01-11 RX ORDER — SODIUM CHLORIDE 9 MG/ML
2000 INJECTION, SOLUTION INTRAVENOUS ONCE
Qty: 0 | Refills: 0 | Status: COMPLETED | OUTPATIENT
Start: 2019-01-11 | End: 2019-01-11

## 2019-01-11 RX ORDER — FLUTICASONE PROPIONATE 50 MCG
1 SPRAY, SUSPENSION NASAL
Qty: 0 | Refills: 0 | COMMUNITY

## 2019-01-11 RX ORDER — BUDESONIDE AND FORMOTEROL FUMARATE DIHYDRATE 160; 4.5 UG/1; UG/1
2 AEROSOL RESPIRATORY (INHALATION)
Qty: 0 | Refills: 0 | Status: DISCONTINUED | OUTPATIENT
Start: 2019-01-11 | End: 2019-01-17

## 2019-01-11 RX ORDER — FAMOTIDINE 10 MG/ML
20 INJECTION INTRAVENOUS EVERY 24 HOURS
Qty: 0 | Refills: 0 | Status: DISCONTINUED | OUTPATIENT
Start: 2019-01-11 | End: 2019-01-17

## 2019-01-11 RX ORDER — RANITIDINE HYDROCHLORIDE 150 MG/1
1 TABLET, FILM COATED ORAL
Qty: 0 | Refills: 0 | COMMUNITY

## 2019-01-11 RX ORDER — BUDESONIDE AND FORMOTEROL FUMARATE DIHYDRATE 160; 4.5 UG/1; UG/1
2 AEROSOL RESPIRATORY (INHALATION)
Qty: 0 | Refills: 0 | COMMUNITY

## 2019-01-11 RX ORDER — IPRATROPIUM/ALBUTEROL SULFATE 18-103MCG
3 AEROSOL WITH ADAPTER (GRAM) INHALATION
Qty: 0 | Refills: 0 | COMMUNITY

## 2019-01-11 RX ORDER — HYDROCORTISONE 20 MG
100 TABLET ORAL EVERY 8 HOURS
Qty: 0 | Refills: 0 | Status: DISCONTINUED | OUTPATIENT
Start: 2019-01-11 | End: 2019-01-16

## 2019-01-11 RX ORDER — IPRATROPIUM BROMIDE 21 MCG
2 AEROSOL, SPRAY (ML) NASAL
Qty: 0 | Refills: 0 | COMMUNITY

## 2019-01-11 RX ADMIN — Medication 100 MILLIGRAM(S): at 18:45

## 2019-01-11 RX ADMIN — SODIUM CHLORIDE 2000 MILLILITER(S): 9 INJECTION, SOLUTION INTRAVENOUS at 17:56

## 2019-01-11 NOTE — ED PROVIDER NOTE - PHYSICAL EXAMINATION
Attn - alert, nad, moist mm, lungs - course BS bilat and expir wheezing, cor - rr, abdo - soft, ND, tender midline upper and lower abdo, no guarding or rebound, no CVAT, ext - no C/T or edema.  skin- no ecchymosis or petechia. neuro - nonfocal.

## 2019-01-11 NOTE — ED CLERICAL - NS ED CLERK NOTE PRE-ARRIVAL INFORMATION; ADDITIONAL PRE-ARRIVAL INFORMATION
CC/Reason For referral:   severe ulcerative colitis and diarrhea  Preferred Consultant(if applicable):  Who admits for you (if needed):  hospitalist  Do you have documents you would like to fax over?  no  Would you still like to speak to an ED attending?   please call after patient is seen

## 2019-01-11 NOTE — ED PROVIDER NOTE - NOTES
Recommended IVF and hydrocortisone 100mg q8 Recommended IVF and hydrocortisone 100mg q8. Recommended clear liquid diet

## 2019-01-11 NOTE — CONSULT NOTE ADULT - SUBJECTIVE AND OBJECTIVE BOX
Patient is a 44y old  Female who presents with a chief complaint of UC flare (2019 22:36)      HPI:  44F w/ hx of bronchiectasis, hypothyroid, recently diagnosed UC p/w diarrhea. Pt has been having diarrhea and bloating for past 4 weeks. Starting 2 weeks ago she started having more bright red blood in stool than just diarrhea. She goes many times a day and sometimes at night. She states she has had no particular difficulty but the severity of her diarrhea has been gradually worsening. She endorses colonoscopy 2 days ago in GI office. She states she was called today notifying her of UC diagnosis and told to go to ER for IV steroids. She states she had not had these types of symptoms in the past. Denies any fevers, chills, skin lesions or abd pain. States her respiratory status has been at baseline. States she has been tested for C. diff and was negative twice. Denies stool parasites as well. Feels it is related to her hx of chronic azithromycin use.     Acute diarrhea x 4 weeks. outpatient workup with neg stool studies PCR and c. diff x 3 neg.  Colon 2 days ago with pancolitis.  Bx + for ulcerative colitis.  Ongoing diarrhea and now blood.  Told to go to ER.  No fever or chills.    Denies family hx of UC (2019 22:36)      PAST MEDICAL & SURGICAL HISTORY:  Ulcerative colitis  GERD (gastroesophageal reflux disease)  Allergic rhinitis  Bronchiectasis  Acquired hypothyroidism  Malignant melanoma of skin of right lower leyrs ago  H/O  section  S/P T&A (status post tonsillectomy and adenoidectomy)      Allergies    Augmentin (Rash)  sulfa drugs (Unknown)  sulfamethoxazole-trimethoprim (Rash)    Intolerances    vancomycin (Nausea; Hypotension)      MEDICATIONS  (STANDING):  buDESOnide 160 MICROgram(s)/formoterol 4.5 MICROgram(s) Inhaler 2 Puff(s) Inhalation two times a day  famotidine Injectable 20 milliGRAM(s) IV Push every 24 hours  hydrocortisone sodium succinate Injectable 100 milliGRAM(s) IV Push every 8 hours  levothyroxine 25 MICROGram(s) Oral daily    MEDICATIONS  (PRN):      Review of Systems:  No fever or chills.  No SOB or CP.  No myalgia.      Vital Signs Last 24 Hrs  T(C): 36.9 (2019 22:18), Max: 37.2 (2019 18:47)  T(F): 98.4 (2019 22:18), Max: 99 (2019 18:47)  HR: 74 (2019 22:18) (74 - 102)  BP: 99/66 (2019 22:18) (99/66 - 122/82)  BP(mean): --  RR: 18 (2019 22:18) (18 - 20)  SpO2: 96% (2019 22:18) (96% - 98%)    PHYSICAL EXAM:  Constitutional: NAD, well-developed  Neck: No LAD, supple  Respiratory: CTA and P  Cardiovascular: S1 and S2, RRR, no M  Gastrointestinal: BS+, soft, NT/ND, neg HSM,  Extremities: No peripheral edema, neg clubing, cyanosis  Vascular: 2+ peripheral pulses  Neurological: A/O x 3, no focal deficits  Psychiatric: Normal mood, normal affect  Skin: No rashes    LABS:                        12.9   18.4  )-----------( 483      ( 2019 18:00 )             37.5     01-11    139  |  99  |  6<L>  ----------------------------<  100<H>  3.8   |  24  |  0.75    Ca    9.1      2019 18:00  Phos  3.6     -11  Mg     1.9     11    TPro  8.4<H>  /  Alb  4.0  /  TBili  0.7  /  DBili  x   /  AST  12  /  ALT  9<L>  /  AlkPhos  71  -11    PT/INR - ( 2019 18:00 )   PT: 12.0 sec;   INR: 1.05 ratio         PTT - ( 2019 18:00 )  PTT:37.1 sec  Urinalysis Basic - ( 2019 18:14 )    Color: Yellow / Appearance: Slightly Turbid / S.029 / pH: x  Gluc: x / Ketone: Negative  / Bili: Negative / Urobili: Negative   Blood: x / Protein: 100 mg/dL / Nitrite: Negative   Leuk Esterase: Negative / RBC: 3 /hpf / WBC 4 /HPF   Sq Epi: x / Non Sq Epi: 29 /hpf / Bacteria: Many      LIVER FUNCTIONS - ( 2019 18:00 )  Alb: 4.0 g/dL / Pro: 8.4 g/dL / ALK PHOS: 71 U/L / ALT: 9 U/L / AST: 12 U/L / GGT: x             RADIOLOGY & ADDITIONAL TESTS:

## 2019-01-11 NOTE — CONSULT NOTE ADULT - ASSESSMENT
In summary, 43 yo female with acute diarrhea with neg w/u for infectious diarrhea, but colonoscopy with biopsy + for ulcerative coitis.    Rec:    1) Liquid diet  2) Hydrocortisone 100 mg IV q 8  3) Protonix 40 mg IV qd    Giancarlo Landa MD

## 2019-01-11 NOTE — ED PROVIDER NOTE - MEDICAL DECISION MAKING DETAILS
Attn- pt with new Dx of Ulcerative Colitis by Bx.  As per Dr. Landa GI - hydrocortisone, hydration and admit to hospitalist.  specifically no CT or ABx at this time.

## 2019-01-11 NOTE — H&P ADULT - HISTORY OF PRESENT ILLNESS
44F w/ hx of bronchiectasis, hypothyroid, recently diagnosed UC p/w diarrhea. Pt has been having diarrhea and bloating for past 4 weeks. Starting 2 weeks ago she started having more bright red blood in stool than just diarrhea. She goes many times a day and sometimes at night. She states she has had no particular difficulty but the severity of her diarrhea has been gradually worsening. She endorses colonoscopy 2 days ago in GI office. She states she was called today notifying her of UC diagnosis and told to go to ER for IV steroids. She states she had not had these types of symptoms in the past. Denies any fevers, chills, skin lesions or abd pain. States her respiratory status has been at baseline. States she has been tested for C. diff and was negative twice. Denies stool parasites as well. Feels it is related to her hx of chronic azithromycin use.     Denies family hx of UC

## 2019-01-11 NOTE — H&P ADULT - FAMILY HISTORY
Family history of hypothyroidism     Family history of thyroid cancer     Grandparent  Still living? Unknown  Family history of diabetes mellitus, Age at diagnosis: Age Unknown

## 2019-01-11 NOTE — ED PROVIDER NOTE - OBJECTIVE STATEMENT
45 y/o female PMHx hypothyroidism, Bronchiectasis (discontinued azithro three times weekly 4 months ago), recently diagnosed with Ulcerative Colitis via biopsy from C-scope by GI Dr. Landa two days ago was directed by GI for UC flare as patient has 4 weeks of diarrhea with confirmed negative stool studies. Patient stated initially her stools were soft for two weeks and then over the last two weeks she had hematochezia >10 episodes daily. Has generalized crampy abdominal pain. Patient also reported urinary frequency over last 24 hours and dark urine. Patient denied fever, chills, cough, CP, SOB, weakness, dizziness, headache, LOC.  LMP 12/30/18 45 y/o female PMHx hypothyroidism, Bronchiectasis (discontinued azithro three times weekly 4 months ago), recently diagnosed with Ulcerative Colitis via biopsy from C-scope by GI Dr. Landa two days ago was directed by GI for UC flare as patient has 4 weeks of diarrhea with confirmed negative stool studies. Patient stated initially her stools were soft for two weeks and then over the last two weeks she had hematochezia >10 episodes daily. Has generalized crampy abdominal pain. Patient also reported urinary frequency over last 24 hours and dark urine. Patient denied fever, chills, cough, CP, SOB, weakness, dizziness, headache, LOC.  LMP 12/30/18     Attn - pt seen in [facility] - agree with above - pt sent to ER for admission, IV hydration and IV hydrocortisone.  Pt with abdo pressure and loose BM x 2 weeks followed by hematochezia x the last two weeks.  Pt had CT abdo/pelvis last week and colonoscopy and bx recently and dx with UC.  WBC 19 earlier this week.  pt also c/o low and mid back ache.  no change in appetite. no other bleeding sites.  no fever.  hx of bronchiectasis, but breathing is "good". 43 y/o female PMHx hypothyroidism, Bronchiectasis (discontinued azithro three times weekly 4 months ago), recently diagnosed with Ulcerative Colitis via biopsy from C-scope by GI Dr. Landa two days ago was directed by GI for UC flare as patient has 4 weeks of diarrhea with confirmed negative stool studies. Patient stated initially her stools were soft for two weeks and then over the last two weeks she had hematochezia >10 episodes daily. Has generalized crampy abdominal pain. Patient also reported urinary frequency over last 24 hours and dark urine. Patient denied fever, chills, cough, CP, SOB, weakness, dizziness, headache, LOC.  LMP 12/30/18. Had negative CT AP 1/4/18     Attn - pt seen in [facility] - agree with above - pt sent to ER for admission, IV hydration and IV hydrocortisone.  Pt with abdo pressure and loose BM x 2 weeks followed by hematochezia x the last two weeks.  Pt had CT abdo/pelvis last week and colonoscopy and bx recently and dx with UC.  WBC 19 earlier this week.  pt also c/o low and mid back ache.  no change in appetite. no other bleeding sites.  no fever.  hx of bronchiectasis, but breathing is "good".

## 2019-01-11 NOTE — ED ADULT NURSE NOTE - OBJECTIVE STATEMENT
44y Female PMH hypothyroidism,  Bronchiectasis, Ulcerative Colitis presents to the ED c/o diarrhea. Pt reports 4 weeks of diarrhea with two weeks of blood in watery diarrhea. Pt had negative stool cultures. Pt reporting 10-12 episodes of diarrhea and states she has tried the BRAT diet along with other things and nothing has gotten better. Pt also reporting abd pressure on and off, denies pain now. 44y Female PMH hypothyroidism,  Bronchiectasis, Ulcerative Colitis presents to the ED c/o diarrhea. Pt reports 4 weeks of diarrhea with two weeks of blood in watery diarrhea. Pt had negative stool cultures. Pt reporting 10-12 episodes of diarrhea and states she has tried the BRAT diet along with other things and nothing has gotten better. Pt also reporting abd pressure on and off, denies pain now. Pt had colonoscopy and biopsy and was recently diagnosed with UC. Pt presents a&oX4, well in appearance, ambulatory, airway intact, breathing spontaneously and unlabored, lung sounds clear bilaterally, abd soft nondistended tender to palpation diffusely, skin warm dry and intact, cap refill <3 seconds, +peripheral pulses present, denies ha, dizziness, CP, SOB, fever/chills, dysuria, hematuria. MD at bedside for eval. safety maintained.

## 2019-01-11 NOTE — H&P ADULT - PROBLEM SELECTOR PLAN 1
Reportedly UC diagnosed on biopsy several days ago. Reportedly severe UC. She also states she had CT abd 3 days ago. As per pt was tested for c. diff twice during symptoms and was negative. Note negative procalcitonin and leukocytosis to 18. Pt states minimal pain for now, can tolerate solid food.  -Will Cont. IV solucortif  -Trend wbc  -F/u GI recommendations  -Full liquid diet, advance as tolerated  -CRP

## 2019-01-11 NOTE — ED PROVIDER NOTE - PMH
Acquired hypothyroidism    Allergic rhinitis    Bronchiectasis    GERD (gastroesophageal reflux disease)    Ulcerative colitis

## 2019-01-11 NOTE — H&P ADULT - NSHPPHYSICALEXAM_GEN_ALL_CORE
Vital Signs Last 24 Hrs  T(C): 36.9 (01-11-19 @ 22:18), Max: 37.2 (01-11-19 @ 18:47)  T(F): 98.4 (01-11-19 @ 22:18), Max: 99 (01-11-19 @ 18:47)  HR: 74 (01-11-19 @ 22:18) (74 - 102)  BP: 99/66 (01-11-19 @ 22:18) (99/66 - 122/82)  BP(mean): --  RR: 18 (01-11-19 @ 22:18) (18 - 20)  SpO2: 96% (01-11-19 @ 22:18) (96% - 98%)

## 2019-01-12 DIAGNOSIS — K51.90 ULCERATIVE COLITIS, UNSPECIFIED, WITHOUT COMPLICATIONS: ICD-10-CM

## 2019-01-12 LAB
ANION GAP SERPL CALC-SCNC: 15 MMOL/L — SIGNIFICANT CHANGE UP (ref 5–17)
BASOPHILS # BLD AUTO: 0.03 K/UL — SIGNIFICANT CHANGE UP (ref 0–0.2)
BASOPHILS NFR BLD AUTO: 0.2 % — SIGNIFICANT CHANGE UP (ref 0–2)
BUN SERPL-MCNC: 4 MG/DL — LOW (ref 7–23)
CALCIUM SERPL-MCNC: 9 MG/DL — SIGNIFICANT CHANGE UP (ref 8.4–10.5)
CHLORIDE SERPL-SCNC: 101 MMOL/L — SIGNIFICANT CHANGE UP (ref 96–108)
CO2 SERPL-SCNC: 23 MMOL/L — SIGNIFICANT CHANGE UP (ref 22–31)
CREAT SERPL-MCNC: 0.62 MG/DL — SIGNIFICANT CHANGE UP (ref 0.5–1.3)
CRP SERPL-MCNC: 0.74 MG/DL — HIGH (ref 0–0.4)
CULTURE RESULTS: NO GROWTH — SIGNIFICANT CHANGE UP
EOSINOPHIL # BLD AUTO: 0 K/UL — SIGNIFICANT CHANGE UP (ref 0–0.5)
EOSINOPHIL NFR BLD AUTO: 0 % — SIGNIFICANT CHANGE UP (ref 0–6)
ERYTHROCYTE [SEDIMENTATION RATE] IN BLOOD: 41 MM/HR — HIGH (ref 0–15)
GLUCOSE SERPL-MCNC: 136 MG/DL — HIGH (ref 70–99)
HCT VFR BLD CALC: 35.3 % — SIGNIFICANT CHANGE UP (ref 34.5–45)
HGB BLD-MCNC: 11.5 G/DL — SIGNIFICANT CHANGE UP (ref 11.5–15.5)
IMM GRANULOCYTES NFR BLD AUTO: 0.4 % — SIGNIFICANT CHANGE UP (ref 0–1.5)
LYMPHOCYTES # BLD AUTO: 1.55 K/UL — SIGNIFICANT CHANGE UP (ref 1–3.3)
LYMPHOCYTES # BLD AUTO: 9.3 % — LOW (ref 13–44)
MAGNESIUM SERPL-MCNC: 1.9 MG/DL — SIGNIFICANT CHANGE UP (ref 1.6–2.6)
MCHC RBC-ENTMCNC: 28.5 PG — SIGNIFICANT CHANGE UP (ref 27–34)
MCHC RBC-ENTMCNC: 32.6 GM/DL — SIGNIFICANT CHANGE UP (ref 32–36)
MCV RBC AUTO: 87.4 FL — SIGNIFICANT CHANGE UP (ref 80–100)
MONOCYTES # BLD AUTO: 0.32 K/UL — SIGNIFICANT CHANGE UP (ref 0–0.9)
MONOCYTES NFR BLD AUTO: 1.9 % — LOW (ref 2–14)
NEUTROPHILS # BLD AUTO: 14.67 K/UL — HIGH (ref 1.8–7.4)
NEUTROPHILS NFR BLD AUTO: 88.2 % — HIGH (ref 43–77)
PLATELET # BLD AUTO: 478 K/UL — HIGH (ref 150–400)
POTASSIUM SERPL-MCNC: 3.9 MMOL/L — SIGNIFICANT CHANGE UP (ref 3.5–5.3)
POTASSIUM SERPL-SCNC: 3.9 MMOL/L — SIGNIFICANT CHANGE UP (ref 3.5–5.3)
RBC # BLD: 4.04 M/UL — SIGNIFICANT CHANGE UP (ref 3.8–5.2)
RBC # FLD: 14.3 % — SIGNIFICANT CHANGE UP (ref 10.3–14.5)
SODIUM SERPL-SCNC: 139 MMOL/L — SIGNIFICANT CHANGE UP (ref 135–145)
SPECIMEN SOURCE: SIGNIFICANT CHANGE UP
WBC # BLD: 16.63 K/UL — HIGH (ref 3.8–10.5)
WBC # FLD AUTO: 16.63 K/UL — HIGH (ref 3.8–10.5)

## 2019-01-12 PROCEDURE — 99233 SBSQ HOSP IP/OBS HIGH 50: CPT

## 2019-01-12 RX ORDER — SIMETHICONE 80 MG/1
80 TABLET, CHEWABLE ORAL ONCE
Qty: 0 | Refills: 0 | Status: COMPLETED | OUTPATIENT
Start: 2019-01-12 | End: 2019-01-12

## 2019-01-12 RX ORDER — PANTOPRAZOLE SODIUM 20 MG/1
40 TABLET, DELAYED RELEASE ORAL DAILY
Qty: 0 | Refills: 0 | Status: DISCONTINUED | OUTPATIENT
Start: 2019-01-12 | End: 2019-01-12

## 2019-01-12 RX ADMIN — SIMETHICONE 80 MILLIGRAM(S): 80 TABLET, CHEWABLE ORAL at 18:43

## 2019-01-12 RX ADMIN — FAMOTIDINE 20 MILLIGRAM(S): 10 INJECTION INTRAVENOUS at 05:05

## 2019-01-12 RX ADMIN — Medication 100 MILLIGRAM(S): at 02:02

## 2019-01-12 RX ADMIN — Medication 25 MICROGRAM(S): at 05:05

## 2019-01-12 RX ADMIN — Medication 100 MILLIGRAM(S): at 17:35

## 2019-01-12 RX ADMIN — Medication 100 MILLIGRAM(S): at 10:30

## 2019-01-12 NOTE — PROGRESS NOTE ADULT - SUBJECTIVE AND OBJECTIVE BOX
Colonoscopy report from office 1/09/19    INDICATION:         Subacute diarrhea but negative multiple stool studies including C. difficile    Informed consent was obtained prior to the procedure after discussing risks, benefits and alternatives of the procedure in detail.     PROCEDURE:      Instruments: PCF H180-AL (serial # [id number]).   Extent: Terminal Ileum.   Complications: None.   Prep Quality: Good.       ANESTHESIA:         Monitored Anesthesia Care    FINDINGS:  TALHA:                            Normal  Rectum:                       Diffuse colitis moderate to severe with erosions exudate  Sigmoid Colon:           Diffuse colitis moderate to severe with erosions exudate  Descending Colon:      Diffuse colitis moderate to severe with erosions exudate  Splenic Flexure:          Diffuse colitis moderate to severe with erosions exudate  Transverse Colon:      Diffuse colitis moderate to severe with erosions exudate about the proximal transverse colon is spared  Hepatic Flexure:         Patchy colitis  Ascending Colon:        Patchy colitis  Cecum:                        Patchy colitis  Ileocecal Valve:          Normal  Terminal Ileum:           Normal    Random biopsies were taken in the terminal ileum and throughout the colon    IMPRESSION:        	Colitis, rule out inflammatory bowel disease    RECOMMENDATIONS:      Procedure:  Written Prov Doc Curr Meds Verified         Check pathology and return within 2-3 days.  Send further stool studies including C. difficile.  If there is evidence of chronicity to the pathology and most likely inflammatory bowel disease and treat with steroids and 5-ASA.    Thank you.    Giancarlo Landa M.D.      Images:         None - 2: 	  None - 3: 	  None - 4: 	    None - 5: 	  None - 8:

## 2019-01-12 NOTE — PROGRESS NOTE ADULT - PROBLEM SELECTOR PLAN 3
Pt states she only  uses nebs as needed at home. Respiratory symptoms at baseline.  -Cont. symbicort

## 2019-01-12 NOTE — PROGRESS NOTE ADULT - SUBJECTIVE AND OBJECTIVE BOX
Patient is a 44y old  Female who presents with a chief complaint of UC flare (12 Jan 2019 11:03)      SUBJECTIVE / OVERNIGHT EVENTS: Patient reports abdominal discomfort, no diarrhea/ constipation/ nausea/ vomiting.   ROS other wise negative.    No events over night.     T(C): 36.7 (01-12-19 @ 13:30), Max: 37.1 (01-12-19 @ 07:27)  HR: 80 (01-12-19 @ 13:30) (73 - 84)  BP: 111/74 (01-12-19 @ 13:30) (101/66 - 111/74)  RR: 18 (01-12-19 @ 13:30) (16 - 18)  SpO2: 95% (01-12-19 @ 13:30) (95% - 96%)    MEDICATIONS  (STANDING):  buDESOnide 160 MICROgram(s)/formoterol 4.5 MICROgram(s) Inhaler 2 Puff(s) Inhalation two times a day  famotidine Injectable 20 milliGRAM(s) IV Push every 24 hours  hydrocortisone sodium succinate Injectable 100 milliGRAM(s) IV Push every 8 hours  levothyroxine 25 MICROGram(s) Oral daily    MEDICATIONS  (PRN):      PHYSICAL EXAM:  GENERAL: NAD, well-developed  HEAD:  Atraumatic, Normocephalic  EYES: EOMI, conjunctiva and sclera clear  NECK: Supple, No JVD  CHEST/LUNG: Clear to auscultation bilaterally; No wheeze  HEART: Regular rate and rhythm; No murmurs, rubs, or gallops  ABDOMEN: Soft, Nontender, Nondistended; Bowel sounds present  EXTREMITIES:  2+ Peripheral Pulses, No clubbing, cyanosis, or edema  PSYCH: AAOx3  NEUROLOGY: non-focal  SKIN: No rashes or lesions                          11.5   16.63 )-----------( 478      ( 12 Jan 2019 07:16 )             35.3           LIVER FUNCTIONS - ( 11 Jan 2019 18:00 )  Alb: 4.0 g/dL / Pro: 8.4 g/dL / ALK PHOS: 71 U/L / ALT: 9 U/L / AST: 12 U/L / GGT: x           PT/INR - ( 11 Jan 2019 18:00 )   PT: 12.0 sec;   INR: 1.05 ratio         PTT - ( 11 Jan 2019 18:00 )  PTT:37.1 sec  139|101|4<136  3.9|23|0.62  9.0,1.9,--  01-12 @ 05:03  139|99|6<100  3.8|24|0.75  9.1,1.9,3.6  01-11 @ 18:00        RADIOLOGY & ADDITIONAL TESTS:    Imaging Personally Reviewed:    Consultant(s) Notes Reviewed:  GI     Care Discussed with Consultants/Other Providers:

## 2019-01-12 NOTE — PROGRESS NOTE ADULT - SUBJECTIVE AND OBJECTIVE BOX
[facility] GI coverage for Dr Giancarlo Landa    Overnight is feeling a little better.  Still with bloating.   No BM overnight.  No nausea or vomiting.  Tolerating clear diet.        Review of Systems:    General:  No wt loss, fevers, chills, night sweats,fatigue,   CV:  No pain, palpitatioins, hypo/hypertension  Resp:  No dyspnea, cough, tachypnea, wheezing  GI:  No pain, No nausea, No vomiting, No diarrhea, No constipatiion, No weight loss, No fever, No pruritis, No rectal bleeding, No tarry stools, No dysphagia,  :  No pain, bleeding, incontinence, nocturia      Vital Signs Last 24 Hrs  T(C): 37.1 (2019 07:27), Max: 37.2 (2019 18:47)  T(F): 98.7 (2019 07:27), Max: 99 (2019 18:47)  HR: 80 (2019 07:27) (73 - 102)  BP: 103/73 (2019 07:27) (99/66 - 122/82)  BP(mean): --  RR: 16 (2019 07:27) (16 - 20)  SpO2: 96% (2019 07:27) (95% - 98%)    PHYSICAL EXAM:    Constitutional: NAD, well-developed  Neck: No LAD, supple  Respiratory: CTA and P  Cardiovascular: S1 and S2, RRR, no M  Gastrointestinal: BS+, soft, NT/ND, neg HSM,  Extremities: No peripheral edema, neg clubing, cyanosis  Vascular: 2+ peripheral pulses  Neurological: A/O x 3, no focal deficits  Psychiatric: Normal mood, normal affect  Skin: No rashes    MEDICATIONS  (STANDING):  buDESOnide 160 MICROgram(s)/formoterol 4.5 MICROgram(s) Inhaler 2 Puff(s) Inhalation two times a day  famotidine Injectable 20 milliGRAM(s) IV Push every 24 hours  hydrocortisone sodium succinate Injectable 100 milliGRAM(s) IV Push every 8 hours  levothyroxine 25 MICROGram(s) Oral daily  pantoprazole  Injectable 40 milliGRAM(s) IV Push daily    MEDICATIONS  (PRN):      Allergies    Augmentin (Rash)  sulfa drugs (Unknown)  sulfamethoxazole-trimethoprim (Rash)    Intolerances    vancomycin (Nausea; Hypotension)        LABS:                        11.5   16.63 )-----------( 478      ( 2019 07:16 )             35.3                         12.9   18.4  )-----------( 483      ( 2019 18:00 )             37.5     0112    139  |  101  |  4<L>  ----------------------------<  136<H>  3.9   |  23  |  0.62    Ca    9.0      2019 05:03  Phos  3.6       Mg     1.9         TPro  8.4<H>  /  Alb  4.0  /  TBili  0.7  /  DBili  x   /  AST  12  /  ALT  9<L>  /  AlkPhos  71  11    PT/INR - ( 2019 18:00 )   PT: 12.0 sec;   INR: 1.05 ratio         PTT - ( 2019 18:00 )  PTT:37.1 sec  Urinalysis Basic - ( 2019 18:14 )    Color: Yellow / Appearance: Slightly Turbid / S.029 / pH: x  Gluc: x / Ketone: Negative  / Bili: Negative / Urobili: Negative   Blood: x / Protein: 100 mg/dL / Nitrite: Negative   Leuk Esterase: Negative / RBC: 3 /hpf / WBC 4 /HPF   Sq Epi: x / Non Sq Epi: 29 /hpf / Bacteria: Many      LIVER FUNCTIONS - ( 2019 18:00 )  Alb: 4.0 g/dL / Pro: 8.4 g/dL / ALK PHOS: 71 U/L / ALT: 9 U/L / AST: 12 U/L / GGT: x           Lipase, Serum: 17 U/L (19 @ 18:00)      RADIOLOGY & ADDITIONAL TESTS:

## 2019-01-12 NOTE — PROGRESS NOTE ADULT - PROBLEM SELECTOR PLAN 1
Reportedly UC diagnosed on biopsy several days ago, seen by GI, on   IV solucortif  -Trend wbc  - Clears, advance as tolerated  - Follow up HbA1C, FS given hx gestational diabetes and currently on steroids.

## 2019-01-13 LAB
ANION GAP SERPL CALC-SCNC: 11 MMOL/L — SIGNIFICANT CHANGE UP (ref 5–17)
BUN SERPL-MCNC: 6 MG/DL — LOW (ref 7–23)
CALCIUM SERPL-MCNC: 9.1 MG/DL — SIGNIFICANT CHANGE UP (ref 8.4–10.5)
CHLORIDE SERPL-SCNC: 102 MMOL/L — SIGNIFICANT CHANGE UP (ref 96–108)
CO2 SERPL-SCNC: 26 MMOL/L — SIGNIFICANT CHANGE UP (ref 22–31)
CREAT SERPL-MCNC: 0.71 MG/DL — SIGNIFICANT CHANGE UP (ref 0.5–1.3)
GLUCOSE SERPL-MCNC: 92 MG/DL — SIGNIFICANT CHANGE UP (ref 70–99)
HBA1C BLD-MCNC: 5.3 % — SIGNIFICANT CHANGE UP (ref 4–5.6)
HCT VFR BLD CALC: 32.9 % — LOW (ref 34.5–45)
HGB BLD-MCNC: 10.7 G/DL — LOW (ref 11.5–15.5)
MCHC RBC-ENTMCNC: 28.8 PG — SIGNIFICANT CHANGE UP (ref 27–34)
MCHC RBC-ENTMCNC: 32.5 GM/DL — SIGNIFICANT CHANGE UP (ref 32–36)
MCV RBC AUTO: 88.4 FL — SIGNIFICANT CHANGE UP (ref 80–100)
PLATELET # BLD AUTO: 516 K/UL — HIGH (ref 150–400)
POTASSIUM SERPL-MCNC: 3.5 MMOL/L — SIGNIFICANT CHANGE UP (ref 3.5–5.3)
POTASSIUM SERPL-SCNC: 3.5 MMOL/L — SIGNIFICANT CHANGE UP (ref 3.5–5.3)
RAPID RVP RESULT: DETECTED
RBC # BLD: 3.72 M/UL — LOW (ref 3.8–5.2)
RBC # FLD: 14.4 % — SIGNIFICANT CHANGE UP (ref 10.3–14.5)
RV+EV RNA SPEC QL NAA+PROBE: DETECTED
SODIUM SERPL-SCNC: 139 MMOL/L — SIGNIFICANT CHANGE UP (ref 135–145)
WBC # BLD: 13.05 K/UL — HIGH (ref 3.8–10.5)
WBC # FLD AUTO: 13.05 K/UL — HIGH (ref 3.8–10.5)

## 2019-01-13 PROCEDURE — 99233 SBSQ HOSP IP/OBS HIGH 50: CPT

## 2019-01-13 RX ORDER — SIMETHICONE 80 MG/1
80 TABLET, CHEWABLE ORAL ONCE
Qty: 0 | Refills: 0 | Status: COMPLETED | OUTPATIENT
Start: 2019-01-13 | End: 2019-01-13

## 2019-01-13 RX ORDER — POTASSIUM CHLORIDE 20 MEQ
20 PACKET (EA) ORAL
Qty: 0 | Refills: 0 | Status: COMPLETED | OUTPATIENT
Start: 2019-01-13 | End: 2019-01-13

## 2019-01-13 RX ORDER — SODIUM CHLORIDE 9 MG/ML
1000 INJECTION, SOLUTION INTRAVENOUS
Qty: 0 | Refills: 0 | Status: DISCONTINUED | OUTPATIENT
Start: 2019-01-13 | End: 2019-01-15

## 2019-01-13 RX ADMIN — SODIUM CHLORIDE 80 MILLILITER(S): 9 INJECTION, SOLUTION INTRAVENOUS at 22:27

## 2019-01-13 RX ADMIN — FAMOTIDINE 20 MILLIGRAM(S): 10 INJECTION INTRAVENOUS at 06:15

## 2019-01-13 RX ADMIN — Medication 20 MILLIEQUIVALENT(S): at 15:16

## 2019-01-13 RX ADMIN — Medication 100 MILLIGRAM(S): at 15:12

## 2019-01-13 RX ADMIN — Medication 100 MILLIGRAM(S): at 22:19

## 2019-01-13 RX ADMIN — SIMETHICONE 80 MILLIGRAM(S): 80 TABLET, CHEWABLE ORAL at 00:29

## 2019-01-13 RX ADMIN — Medication 20 MILLIEQUIVALENT(S): at 11:00

## 2019-01-13 RX ADMIN — SIMETHICONE 80 MILLIGRAM(S): 80 TABLET, CHEWABLE ORAL at 23:03

## 2019-01-13 RX ADMIN — SODIUM CHLORIDE 80 MILLILITER(S): 9 INJECTION, SOLUTION INTRAVENOUS at 11:01

## 2019-01-13 RX ADMIN — BUDESONIDE AND FORMOTEROL FUMARATE DIHYDRATE 2 PUFF(S): 160; 4.5 AEROSOL RESPIRATORY (INHALATION) at 06:15

## 2019-01-13 RX ADMIN — Medication 100 MILLIGRAM(S): at 06:15

## 2019-01-13 NOTE — PROGRESS NOTE ADULT - PROBLEM SELECTOR PLAN 1
Continue IV Hydrocortisone 100 mg q 8  Would start IV fluids at maintainance  Sips of clears and  would not give supplement drinks

## 2019-01-13 NOTE — PROGRESS NOTE ADULT - SUBJECTIVE AND OBJECTIVE BOX
Patient is a 44y old  Female who presents with a chief complaint of UC flare (12 Jan 2019 11:03)      SUBJECTIVE / OVERNIGHT EVENTS: Patient reports diarrhea, loose about 6-7 times a day, associated with abdominal discomfort.   No events over night.     T(C): 36.4 (01-13-19 @ 11:55), Max: 36.7 (01-13-19 @ 08:58)  HR: 73 (01-13-19 @ 11:55) (73 - 83)  BP: 98/65 (01-13-19 @ 11:55) (98/65 - 102/66)  RR: 17 (01-13-19 @ 11:55) (17 - 18)  SpO2: 94% (01-13-19 @ 11:55) (93% - 94%)    MEDICATIONS  (STANDING):  buDESOnide 160 MICROgram(s)/formoterol 4.5 MICROgram(s) Inhaler 2 Puff(s) Inhalation two times a day  dextrose 5%. 1000 milliLiter(s) (80 mL/Hr) IV Continuous <Continuous>  famotidine Injectable 20 milliGRAM(s) IV Push every 24 hours  hydrocortisone sodium succinate Injectable 100 milliGRAM(s) IV Push every 8 hours  levothyroxine 25 MICROGram(s) Oral daily    MEDICATIONS  (PRN):      PHYSICAL EXAM:  GENERAL: NAD, well-developed  HEAD:  Atraumatic, Normocephalic  EYES: EOMI, conjunctiva and sclera clear  NECK: Supple, No JVD  CHEST/LUNG: Clear to auscultation bilaterally; No wheeze  HEART: Regular rate and rhythm; No murmurs, rubs, or gallops  ABDOMEN: Soft, mild tenderness RLQ, nondistended; Bowel sounds present  EXTREMITIES:  2+ Peripheral Pulses, No clubbing, cyanosis, or edema  PSYCH: AAOx3  NEUROLOGY: non-focal  SKIN: No rashes or lesions                                          10.7   13.05 )-----------( 516      ( 13 Jan 2019 08:52 )             32.9               139|102|6<92  3.5|26|0.71  9.1,--,--  01-13 @ 06:42        RADIOLOGY & ADDITIONAL TESTS:    Imaging Personally Reviewed:    Consultant(s) Notes Reviewed:  GI     Care Discussed with Consultants/Other Providers:

## 2019-01-13 NOTE — PROGRESS NOTE ADULT - SUBJECTIVE AND OBJECTIVE BOX
[facility] GI coverage for Dr. Giancarlo Landa      Overnight had several small loose bloody BM's.  Having more abdominal cramping.  No nausea or vomiting.  No fever.    Review of Systems:    General:  No wt loss, fevers, chills, night sweats,fatigue,   CV:  No pain, palpitatioins, hypo/hypertension  Resp:  No dyspnea, cough, tachypnea, wheezing  GI:  No pain, No nausea, No vomiting, No diarrhea, No constipatiion, No weight loss, No fever, No pruritis, No rectal bleeding, No tarry stools, No dysphagia,      Vital Signs Last 24 Hrs  T(C): 36.7 (2019 08:58), Max: 37.1 (2019 12:11)  T(F): 98.1 (2019 08:58), Max: 98.8 (2019 21:27)  HR: 83 (2019 08:58) (73 - 84)  BP: 102/66 (2019 08:58) (91/62 - 111/74)  BP(mean): --  RR: 18 (2019 08:58) (16 - 19)  SpO2: 93% (2019 08:58) (93% - 96%)    PHYSICAL EXAM:    Constitutional: NAD, well-developed  Neck: No LAD, supple  Respiratory: CTA and P  Cardiovascular: S1 and S2, RRR, no M  Gastrointestinal: BS+, soft, NT/ND, neg HSM,  Extremities: No peripheral edema, neg clubing, cyanosis  Vascular: 2+ peripheral pulses  Neurological: A/O x 3, no focal deficits  Psychiatric: Normal mood, normal affect  Skin: No rashes    MEDICATIONS  (STANDING):  buDESOnide 160 MICROgram(s)/formoterol 4.5 MICROgram(s) Inhaler 2 Puff(s) Inhalation two times a day  famotidine Injectable 20 milliGRAM(s) IV Push every 24 hours  hydrocortisone sodium succinate Injectable 100 milliGRAM(s) IV Push every 8 hours  levothyroxine 25 MICROGram(s) Oral daily    MEDICATIONS  (PRN):      Allergies    Augmentin (Rash)  sulfa drugs (Unknown)  sulfamethoxazole-trimethoprim (Rash)    Intolerances    vancomycin (Nausea; Hypotension)        LABS:                        10.7   13.05 )-----------( 516      ( 2019 08:52 )             32.9                         11.5   16.63 )-----------( 478      ( 2019 07:16 )             35.3                         12.9   18.4  )-----------( 483      ( 2019 18:00 )             37.5     01    139  |  102  |  6<L>  ----------------------------<  92  3.5   |  26  |  0.71    Ca    9.1      2019 06:42  Phos  3.6       Mg     1.9         TPro  8.4<H>  /  Alb  4.0  /  TBili  0.7  /  DBili  x   /  AST  12  /  ALT  9<L>  /  AlkPhos  71  -11    PT/INR - ( 2019 18:00 )   PT: 12.0 sec;   INR: 1.05 ratio         PTT - ( 2019 18:00 )  PTT:37.1 sec  Urinalysis Basic - ( 2019 18:14 )    Color: Yellow / Appearance: Slightly Turbid / S.029 / pH: x  Gluc: x / Ketone: Negative  / Bili: Negative / Urobili: Negative   Blood: x / Protein: 100 mg/dL / Nitrite: Negative   Leuk Esterase: Negative / RBC: 3 /hpf / WBC 4 /HPF   Sq Epi: x / Non Sq Epi: 29 /hpf / Bacteria: Many      LIVER FUNCTIONS - ( 2019 18:00 )  Alb: 4.0 g/dL / Pro: 8.4 g/dL / ALK PHOS: 71 U/L / ALT: 9 U/L / AST: 12 U/L / GGT: x           Lipase, Serum: 17 U/L (19 @ 18:00)      RADIOLOGY & ADDITIONAL TESTS:

## 2019-01-13 NOTE — PROGRESS NOTE ADULT - PROBLEM SELECTOR PLAN 1
Reportedly UC diagnosed on biopsy several days ago, seen by GI, on   IV solucortif  -Keep patient on clears, continue with iv hydration. Reportedly UC diagnosed on biopsy several days ago, seen by GI, on   IV solucortif  -Keep patient on clears, continue with iv hydration.  -GI follow up appreciated.

## 2019-01-14 ENCOUNTER — TRANSCRIPTION ENCOUNTER (OUTPATIENT)
Age: 44
End: 2019-01-14

## 2019-01-14 LAB
ANION GAP SERPL CALC-SCNC: 14 MMOL/L — SIGNIFICANT CHANGE UP (ref 5–17)
BASOPHILS # BLD AUTO: 0.01 K/UL — SIGNIFICANT CHANGE UP (ref 0–0.2)
BASOPHILS NFR BLD AUTO: 0.1 % — SIGNIFICANT CHANGE UP (ref 0–2)
BUN SERPL-MCNC: <4 MG/DL — LOW (ref 7–23)
CALCIUM SERPL-MCNC: 9 MG/DL — SIGNIFICANT CHANGE UP (ref 8.4–10.5)
CHLORIDE SERPL-SCNC: 99 MMOL/L — SIGNIFICANT CHANGE UP (ref 96–108)
CO2 SERPL-SCNC: 25 MMOL/L — SIGNIFICANT CHANGE UP (ref 22–31)
CREAT SERPL-MCNC: 0.65 MG/DL — SIGNIFICANT CHANGE UP (ref 0.5–1.3)
EOSINOPHIL # BLD AUTO: 0 K/UL — SIGNIFICANT CHANGE UP (ref 0–0.5)
EOSINOPHIL NFR BLD AUTO: 0 % — SIGNIFICANT CHANGE UP (ref 0–6)
GLUCOSE SERPL-MCNC: 123 MG/DL — HIGH (ref 70–99)
HCT VFR BLD CALC: 30.9 % — LOW (ref 34.5–45)
HCT VFR BLD CALC: 31.2 % — LOW (ref 34.5–45)
HGB BLD-MCNC: 10 G/DL — LOW (ref 11.5–15.5)
HGB BLD-MCNC: 10.6 G/DL — LOW (ref 11.5–15.5)
IMM GRANULOCYTES NFR BLD AUTO: 0.3 % — SIGNIFICANT CHANGE UP (ref 0–1.5)
LYMPHOCYTES # BLD AUTO: 1.79 K/UL — SIGNIFICANT CHANGE UP (ref 1–3.3)
LYMPHOCYTES # BLD AUTO: 17 % — SIGNIFICANT CHANGE UP (ref 13–44)
MAGNESIUM SERPL-MCNC: 2.1 MG/DL — SIGNIFICANT CHANGE UP (ref 1.6–2.6)
MCHC RBC-ENTMCNC: 28.8 PG — SIGNIFICANT CHANGE UP (ref 27–34)
MCHC RBC-ENTMCNC: 29.5 PG — SIGNIFICANT CHANGE UP (ref 27–34)
MCHC RBC-ENTMCNC: 32.1 GM/DL — SIGNIFICANT CHANGE UP (ref 32–36)
MCHC RBC-ENTMCNC: 34.3 GM/DL — SIGNIFICANT CHANGE UP (ref 32–36)
MCV RBC AUTO: 86.2 FL — SIGNIFICANT CHANGE UP (ref 80–100)
MCV RBC AUTO: 89.9 FL — SIGNIFICANT CHANGE UP (ref 80–100)
MONOCYTES # BLD AUTO: 0.98 K/UL — HIGH (ref 0–0.9)
MONOCYTES NFR BLD AUTO: 9.3 % — SIGNIFICANT CHANGE UP (ref 2–14)
NEUTROPHILS # BLD AUTO: 7.69 K/UL — HIGH (ref 1.8–7.4)
NEUTROPHILS NFR BLD AUTO: 73.3 % — SIGNIFICANT CHANGE UP (ref 43–77)
PHOSPHATE SERPL-MCNC: 3.6 MG/DL — SIGNIFICANT CHANGE UP (ref 2.5–4.5)
PLATELET # BLD AUTO: 446 K/UL — HIGH (ref 150–400)
PLATELET # BLD AUTO: 458 K/UL — HIGH (ref 150–400)
POTASSIUM SERPL-MCNC: 3.9 MMOL/L — SIGNIFICANT CHANGE UP (ref 3.5–5.3)
POTASSIUM SERPL-SCNC: 3.9 MMOL/L — SIGNIFICANT CHANGE UP (ref 3.5–5.3)
RBC # BLD: 3.47 M/UL — LOW (ref 3.8–5.2)
RBC # BLD: 3.59 M/UL — LOW (ref 3.8–5.2)
RBC # FLD: 12.7 % — SIGNIFICANT CHANGE UP (ref 10.3–14.5)
RBC # FLD: 14.3 % — SIGNIFICANT CHANGE UP (ref 10.3–14.5)
SODIUM SERPL-SCNC: 138 MMOL/L — SIGNIFICANT CHANGE UP (ref 135–145)
WBC # BLD: 10.5 K/UL — SIGNIFICANT CHANGE UP (ref 3.8–10.5)
WBC # BLD: 11.3 K/UL — HIGH (ref 3.8–10.5)
WBC # FLD AUTO: 10.5 K/UL — SIGNIFICANT CHANGE UP (ref 3.8–10.5)
WBC # FLD AUTO: 11.3 K/UL — HIGH (ref 3.8–10.5)

## 2019-01-14 PROCEDURE — 99233 SBSQ HOSP IP/OBS HIGH 50: CPT

## 2019-01-14 RX ORDER — MESALAMINE 400 MG
800 TABLET, DELAYED RELEASE (ENTERIC COATED) ORAL
Qty: 0 | Refills: 0 | Status: DISCONTINUED | OUTPATIENT
Start: 2019-01-14 | End: 2019-01-17

## 2019-01-14 RX ADMIN — Medication 100 MILLIGRAM(S): at 05:14

## 2019-01-14 RX ADMIN — Medication 100 MILLIGRAM(S): at 14:09

## 2019-01-14 RX ADMIN — Medication 100 MILLIGRAM(S): at 21:58

## 2019-01-14 RX ADMIN — BUDESONIDE AND FORMOTEROL FUMARATE DIHYDRATE 2 PUFF(S): 160; 4.5 AEROSOL RESPIRATORY (INHALATION) at 05:14

## 2019-01-14 RX ADMIN — FAMOTIDINE 20 MILLIGRAM(S): 10 INJECTION INTRAVENOUS at 05:14

## 2019-01-14 NOTE — PROGRESS NOTE ADULT - SUBJECTIVE AND OBJECTIVE BOX
INTERVAL HPI/OVERNIGHT EVENTS:    Continue to have diarrhea, completing 4th day of IV steroids.  No fever or chills.  However, diarrhea is less bloody and less frequent.  No NVFC.      MEDICATIONS  (STANDING):  buDESOnide 160 MICROgram(s)/formoterol 4.5 MICROgram(s) Inhaler 2 Puff(s) Inhalation two times a day  dextrose 5%. 1000 milliLiter(s) (80 mL/Hr) IV Continuous <Continuous>  famotidine Injectable 20 milliGRAM(s) IV Push every 24 hours  hydrocortisone sodium succinate Injectable 100 milliGRAM(s) IV Push every 8 hours  levothyroxine 25 MICROGram(s) Oral daily    MEDICATIONS  (PRN):      Allergies    Augmentin (Rash)  sulfa drugs (Unknown)  sulfamethoxazole-trimethoprim (Rash)    Intolerances    vancomycin (Nausea; Hypotension)      Review of Systems:   No chest pain.  c/o back pain.  No SOB.  No headaches.    Vital Signs Last 24 Hrs  T(C): 36.4 (14 Jan 2019 12:07), Max: 36.4 (14 Jan 2019 04:25)  T(F): 97.5 (14 Jan 2019 12:07), Max: 97.6 (14 Jan 2019 04:25)  HR: 70 (14 Jan 2019 21:24) (58 - 82)  BP: 116/75 (14 Jan 2019 21:24) (114/74 - 116/75)  BP(mean): --  RR: 18 (14 Jan 2019 21:24) (18 - 18)  SpO2: 96% (14 Jan 2019 21:24) (95% - 97%)    PHYSICAL EXAM:  Constitutional: NAD, well-developed  Neck: No LAD, supple  Respiratory: CTAB  Cardiovascular: S1 and S2, RRR,   Gastrointestinal: BS+, soft, NT/ND, neg HSM,  no rebound or guarding.  Extremities: No peripheral edema, neg clubing, cyanosis  Vascular: 2+ peripheral pulses  Neurological: A/O x 3, no focal deficits  Psychiatric: Normal mood, normal affect  Skin: No rashes    LABS:                        10.6   11.3  )-----------( 458      ( 14 Jan 2019 17:27 )             30.9     01-14    138  |  99  |  <4<L>  ----------------------------<  123<H>  3.9   |  25  |  0.65    Ca    9.0      14 Jan 2019 06:25  Phos  3.6     01-14  Mg     2.1     01-14          LIVER FUNCTIONS - ( 11 Jan 2019 18:00 )  Alb: 4.0 g/dL / Pro: 8.4 g/dL / ALK PHOS: 71 U/L / ALT: 9 U/L / AST: 12 U/L / GGT: x             RADIOLOGY & ADDITIONAL TESTS:

## 2019-01-14 NOTE — DISCHARGE NOTE ADULT - PATIENT PORTAL LINK FT
You can access the [email or web address] Patient Portal, offered by [facility], by registering with the following website: [email or web address]

## 2019-01-14 NOTE — DISCHARGE NOTE ADULT - CARE PLAN
Principal Discharge DX:	Ulcerative colitis with complication, unspecified location  Goal:	resolutions of symptoms  Assessment and plan of treatment:	HOME CARE INSTRUCTIONS  Get plenty of rest.  Drink enough water and fluids to keep your urine clear or pale yellow.  Eat a well-balanced diet.  Call your caregiver for follow-up as recommended.  SEEK IMMEDIATE MEDICAL CARE IF:  You develop chills.  You have an oral temperature  not controlled by medicine.  You have extreme weakness, fainting, or dehydration.  You have repeated vomiting.  You develop severe belly (abdominal) pain or are passing bloody or tarry stools  Secondary Diagnosis:	Gastroesophageal reflux disease, esophagitis presence not specified  Assessment and plan of treatment:	Change the factors that you can control. Ask your caregiver for guidance concerning weight loss, quitting smoking, and alcohol consumption. Avoid foods and drinks that make your symptoms worse, such as: Caffeine or alcoholic drinks. Chocolate. spicy foods. Citrus fruits tomato-based foods, Fried and fatty foods.  Avoid lying down for the 3 hours after eating .Eat small, frequent meals  Do not wear anything tight around your waist that causes pressure on your stomach. Raise the head of your bed 6 to 8 inches with wood blocks to help you sleep. Do not take aspirin, ibuprofen, or other nonsteroidal anti-inflammatory drugs (NSAIDs).You have pain in your arms, neck, jaw, teeth, or back. Your pain increases or changes in intensity or duration. You develop nausea, vomiting, or sweating (diaphoresis).You develop shortness of breath, or you faint.Your vomit is green, yellow, black, or looks like coffee grounds or blood.Your stool is red, bloody, or black.These symptoms could be signs of other problems, such as heart disease, gastric bleeding, or esophageal bleeding.  Secondary Diagnosis:	Bronchiectasis  Secondary Diagnosis:	Hypothyroidism, unspecified type Principal Discharge DX:	Ulcerative colitis with complication, unspecified location  Goal:	resolutions of symptoms  Assessment and plan of treatment:	Continue with mesalamine  and prednisone as prescribed  Follow up with Dr. Davis on Tuesday at 12:15 PM in his office   HOME CARE INSTRUCTIONS  Get plenty of rest.  Drink enough water and fluids to keep your urine clear or pale yellow.  Eat a well-balanced diet.  Call your caregiver for follow-up as recommended.  SEEK IMMEDIATE MEDICAL CARE IF:  You develop chills.  You have an oral temperature  not controlled by medicine.  You have extreme weakness, fainting, or dehydration.  You have repeated vomiting.  You develop severe belly (abdominal) pain or are passing bloody or tarry stools  Secondary Diagnosis:	Gastroesophageal reflux disease, esophagitis presence not specified  Assessment and plan of treatment:	Change the factors that you can control. Ask your caregiver for guidance concerning weight loss, quitting smoking, and alcohol consumption. Avoid foods and drinks that make your symptoms worse, such as: Caffeine or alcoholic drinks. Chocolate. spicy foods. Citrus fruits tomato-based foods, Fried and fatty foods.  Avoid lying down for the 3 hours after eating .Eat small, frequent meals  Do not wear anything tight around your waist that causes pressure on your stomach. Raise the head of your bed 6 to 8 inches with wood blocks to help you sleep. Do not take aspirin, ibuprofen, or other nonsteroidal anti-inflammatory drugs (NSAIDs).You have pain in your arms, neck, jaw, teeth, or back. Your pain increases or changes in intensity or duration. You develop nausea, vomiting, or sweating (diaphoresis).You develop shortness of breath, or you faint.Your vomit is green, yellow, black, or looks like coffee grounds or blood.Your stool is red, bloody, or black.These symptoms could be signs of other problems, such as heart disease, gastric bleeding, or esophageal bleeding.  Secondary Diagnosis:	Bronchiectasis  Assessment and plan of treatment:	Please follow up with Dr. Kam  in one week  Continue with medications as prescribed  Secondary Diagnosis:	Hypothyroidism, unspecified type

## 2019-01-14 NOTE — DISCHARGE NOTE ADULT - ADDITIONAL INSTRUCTIONS
Please follow up with you PCP within one week of discharge   Please bring all discharge paperwork and list of medications to all follow up appointments  Please call for follow up appoints one day after discharge Follow up with Dr. Davis on Tuesday at 12:15 PM in his office   Please follow up with you PCP and Dr. Kam within one week of discharge   Please bring all discharge paperwork and list of medications to all follow up appointments  Please call for follow up appoints one day after discharge

## 2019-01-14 NOTE — DISCHARGE NOTE ADULT - CARE PROVIDER_API CALL
Gonsalo Mak (DO), Internal Medicine  [address] [place] [address]  Phone: [phone]  Fax: [phone]    Prudencio Kam  Phone: [phone]  Fax: (   )    - Gonsalo Mak (), Internal Medicine  [address] [place] [address]  Phone: [phone]  Fax: [phone]    Prudencio Kam), Critical Care Medicine  [address] [place] [address]  Phone: [phone]  Fax: [phone] Gonsalo Mak (), Internal Medicine  [address] [place] [address]  Phone: [phone]  Fax: [phone]    Prudencio Kam (MD), Critical Care Medicine  [address] [address]  Phone: [phone]  Fax: [phone]    Ronald Lanad), Gastroenterology; Internal Medicine  [address] [place] [address]  Phone: [phone]  Fax: [phone]

## 2019-01-14 NOTE — DISCHARGE NOTE ADULT - PROVIDER TOKENS
TOKEN:'[id number]',FREE:[LAST:[Kam],FIRST:[Prudencio],PHONE:[phone]],FAX:[(   )    -]] TOKEN:'[id number]:'[id number]' TOKEN:'[id number]:'[id number]:'[id number]'

## 2019-01-14 NOTE — DISCHARGE NOTE ADULT - PLAN OF CARE
resolutions of symptoms HOME CARE INSTRUCTIONS  Get plenty of rest.  Drink enough water and fluids to keep your urine clear or pale yellow.  Eat a well-balanced diet.  Call your caregiver for follow-up as recommended.  SEEK IMMEDIATE MEDICAL CARE IF:  You develop chills.  You have an oral temperature  not controlled by medicine.  You have extreme weakness, fainting, or dehydration.  You have repeated vomiting.  You develop severe belly (abdominal) pain or are passing bloody or tarry stools Change the factors that you can control. Ask your caregiver for guidance concerning weight loss, quitting smoking, and alcohol consumption. Avoid foods and drinks that make your symptoms worse, such as: Caffeine or alcoholic drinks. Chocolate. spicy foods. Citrus fruits tomato-based foods, Fried and fatty foods.  Avoid lying down for the 3 hours after eating .Eat small, frequent meals  Do not wear anything tight around your waist that causes pressure on your stomach. Raise the head of your bed 6 to 8 inches with wood blocks to help you sleep. Do not take aspirin, ibuprofen, or other nonsteroidal anti-inflammatory drugs (NSAIDs).You have pain in your arms, neck, jaw, teeth, or back. Your pain increases or changes in intensity or duration. You develop nausea, vomiting, or sweating (diaphoresis).You develop shortness of breath, or you faint.Your vomit is green, yellow, black, or looks like coffee grounds or blood.Your stool is red, bloody, or black.These symptoms could be signs of other problems, such as heart disease, gastric bleeding, or esophageal bleeding. Continue with mesalamine  and prednisone as prescribed  Follow up with Dr. Davis on Tuesday at 12:15 PM in his office   HOME CARE INSTRUCTIONS  Get plenty of rest.  Drink enough water and fluids to keep your urine clear or pale yellow.  Eat a well-balanced diet.  Call your caregiver for follow-up as recommended.  SEEK IMMEDIATE MEDICAL CARE IF:  You develop chills.  You have an oral temperature  not controlled by medicine.  You have extreme weakness, fainting, or dehydration.  You have repeated vomiting.  You develop severe belly (abdominal) pain or are passing bloody or tarry stools Please follow up with Dr. Kam  in one week  Continue with medications as prescribed

## 2019-01-14 NOTE — PROGRESS NOTE ADULT - ASSESSMENT
In summary, 43 yo female with flare up of ulcerative colitis.  Slow improvement with steroids.      1) Start Asacol  mg po, two tabs tid  2) Continue IV steroids  3) Please get consult from the Dr. Bruce Kam for her bronchiectasis  4) Can advance to full lactose free liquids  5) Am labs.    Giancarlo Landa MD

## 2019-01-14 NOTE — PROGRESS NOTE ADULT - SUBJECTIVE AND OBJECTIVE BOX
Patient is a 44y old  Female who presents with a chief complaint of UC flare (14 Jan 2019 10:29)      SUBJECTIVE / OVERNIGHT EVENTS:  patient seen and examined at bedside. feels better. had about 3 loose stools this am. no blood. tolerating cld.    ROS:  14 point ROS negative in detail except stated as above    MEDICATIONS  (STANDING):  buDESOnide 160 MICROgram(s)/formoterol 4.5 MICROgram(s) Inhaler 2 Puff(s) Inhalation two times a day  dextrose 5%. 1000 milliLiter(s) (80 mL/Hr) IV Continuous <Continuous>  famotidine Injectable 20 milliGRAM(s) IV Push every 24 hours  hydrocortisone sodium succinate Injectable 100 milliGRAM(s) IV Push every 8 hours  levothyroxine 25 MICROGram(s) Oral daily    MEDICATIONS  (PRN):      CAPILLARY BLOOD GLUCOSE        I&O's Summary    13 Jan 2019 07:01  -  14 Jan 2019 07:00  --------------------------------------------------------  IN: 3020 mL / OUT: 0 mL / NET: 3020 mL        PHYSICAL EXAM:  Vital Signs Last 24 Hrs  T(C): 36.4 (14 Jan 2019 04:25), Max: 36.9 (13 Jan 2019 20:45)  T(F): 97.6 (14 Jan 2019 04:25), Max: 98.5 (13 Jan 2019 20:45)  HR: 58 (14 Jan 2019 04:25) (58 - 73)  BP: 115/71 (14 Jan 2019 04:25) (98/65 - 115/71)  BP(mean): --  RR: 18 (14 Jan 2019 04:25) (17 - 18)  SpO2: 95% (14 Jan 2019 04:25) (94% - 97%)      GENERAL: NAD  CHEST/LUNG: Clear to auscultation bilaterally; No wheezes rales or rhonchi  HEART: s1 s2 regular rate and rhythm; No murmurs, rubs, or gallops  ABDOMEN: Soft, Nontender, Nondistended; Bowel sounds present  EXTREMITIES:  2+ Peripheral Pulses, No clubbing, cyanosis, or edema  NEUROLOGY: AAOx3; non-focal  SKIN: No rashes or lesions    LABS:                        10.0   10.50 )-----------( 446      ( 14 Jan 2019 08:13 )             31.2     01-14    138  |  99  |  <4<L>  ----------------------------<  123<H>  3.9   |  25  |  0.65    Ca    9.0      14 Jan 2019 06:25  Phos  3.6     01-14  Mg     2.1     01-14              Consultant(s) Notes Reviewed:    GI  Care Discussed with Consultants/Other Providers:  ANJELICA Lake

## 2019-01-14 NOTE — DISCHARGE NOTE ADULT - HOSPITAL COURSE
44F w/ hx of bronchiectasis, hypothyroid, recently diagnosed UC p/w UC flare.      Problem/Plan - 1:  ·  Problem: Ulcerative colitis with complication, unspecified location.  Plan: symptoms consistent w UC flare  IV hydrocortisone 100mg q8hr changed to PO Prednisone   - full liquid diet lactose free/ asacol qid  - regular diet after lunch  -GI follow up appreciated.      Problem/Plan - 2:  ·  Problem: Gastroesophageal reflux disease, esophagitis presence not specified.  Plan: -Cont. H2 blocker.      Problem/Plan - 3:  ·  Problem: Bronchiectasis without complication.  Plan: Pt states she only  uses nebs as needed at home. Respiratory symptoms at baseline.  -Cont. symbicort  - parish pending.     Problem/Plan - 4:  ·  Problem: Hypothyroidism, unspecified type.  Plan: -Cont. levothyroxine.      Problem/Plan - 5:  ·  Problem: Prophylactic measure.  Plan: DVT PPx  -SCDs.

## 2019-01-14 NOTE — DISCHARGE NOTE ADULT - MEDICATION SUMMARY - MEDICATIONS TO TAKE
I will START or STAY ON the medications listed below when I get home from the hospital:    mesalamine 800 mg oral delayed release tablet  -- 1 tab(s) by mouth 4 times a day   -- Do not chew, break, or crush.  Swallow whole.  Do not crush.    -- Indication: For Ulcerative colitis    predniSONE 20 mg oral tablet  -- 2 tab(s) by mouth once a day  -- Indication: For Ulcerative colitis    Symbicort 160 mcg-4.5 mcg/inh inhalation aerosol  -- 2 puff(s) inhaled 2 times a day  -- Indication: For Bronchiectasis    ipratropium-albuterol 0.5 mg-2.5 mg/3 mLinhalation solution  -- 3 milliliter(s) inhaled 4 times a day  -- Indication: For Bronchiectasis    Zantac 150 oral tablet  -- 1 tab(s) by mouth 2 times a day  -- Indication: For Gastroesophageal reflux disease, esophagitis presence not specified    fluticasone 50 mcg/inh nasal spray  -- 1 spray(s) into nose once a day  -- Indication: For allergies    Synthroid 25 mcg (0.025 mg) oral tablet  -- 1 tab(s) by mouth once a day  -- Indication: For Hypothyroidism, unspecified type

## 2019-01-14 NOTE — DISCHARGE NOTE ADULT - SECONDARY DIAGNOSIS.
Gastroesophageal reflux disease, esophagitis presence not specified Bronchiectasis Hypothyroidism, unspecified type

## 2019-01-15 DIAGNOSIS — J47.9 BRONCHIECTASIS, UNCOMPLICATED: ICD-10-CM

## 2019-01-15 LAB
ANION GAP SERPL CALC-SCNC: 11 MMOL/L — SIGNIFICANT CHANGE UP (ref 5–17)
BUN SERPL-MCNC: 6 MG/DL — LOW (ref 7–23)
CALCIUM SERPL-MCNC: 8.6 MG/DL — SIGNIFICANT CHANGE UP (ref 8.4–10.5)
CHLORIDE SERPL-SCNC: 102 MMOL/L — SIGNIFICANT CHANGE UP (ref 96–108)
CO2 SERPL-SCNC: 29 MMOL/L — SIGNIFICANT CHANGE UP (ref 22–31)
CREAT SERPL-MCNC: 0.67 MG/DL — SIGNIFICANT CHANGE UP (ref 0.5–1.3)
GLUCOSE SERPL-MCNC: 104 MG/DL — HIGH (ref 70–99)
HCT VFR BLD CALC: 29.9 % — LOW (ref 34.5–45)
HGB BLD-MCNC: 9.5 G/DL — LOW (ref 11.5–15.5)
MAGNESIUM SERPL-MCNC: 2.1 MG/DL — SIGNIFICANT CHANGE UP (ref 1.6–2.6)
MCHC RBC-ENTMCNC: 28.4 PG — SIGNIFICANT CHANGE UP (ref 27–34)
MCHC RBC-ENTMCNC: 31.8 GM/DL — LOW (ref 32–36)
MCV RBC AUTO: 89.5 FL — SIGNIFICANT CHANGE UP (ref 80–100)
PLATELET # BLD AUTO: 418 K/UL — HIGH (ref 150–400)
POTASSIUM SERPL-MCNC: 4.1 MMOL/L — SIGNIFICANT CHANGE UP (ref 3.5–5.3)
POTASSIUM SERPL-SCNC: 4.1 MMOL/L — SIGNIFICANT CHANGE UP (ref 3.5–5.3)
RBC # BLD: 3.34 M/UL — LOW (ref 3.8–5.2)
RBC # FLD: 14.3 % — SIGNIFICANT CHANGE UP (ref 10.3–14.5)
SODIUM SERPL-SCNC: 142 MMOL/L — SIGNIFICANT CHANGE UP (ref 135–145)
WBC # BLD: 10.94 K/UL — HIGH (ref 3.8–10.5)
WBC # FLD AUTO: 10.94 K/UL — HIGH (ref 3.8–10.5)

## 2019-01-15 PROCEDURE — 99233 SBSQ HOSP IP/OBS HIGH 50: CPT

## 2019-01-15 PROCEDURE — 71250 CT THORAX DX C-: CPT | Mod: 26

## 2019-01-15 RX ORDER — IPRATROPIUM/ALBUTEROL SULFATE 18-103MCG
3 AEROSOL WITH ADAPTER (GRAM) INHALATION EVERY 6 HOURS
Qty: 0 | Refills: 0 | Status: DISCONTINUED | OUTPATIENT
Start: 2019-01-15 | End: 2019-01-17

## 2019-01-15 RX ADMIN — Medication 800 MILLIGRAM(S): at 23:05

## 2019-01-15 RX ADMIN — Medication 800 MILLIGRAM(S): at 11:57

## 2019-01-15 RX ADMIN — FAMOTIDINE 20 MILLIGRAM(S): 10 INJECTION INTRAVENOUS at 06:02

## 2019-01-15 RX ADMIN — Medication 100 MILLIGRAM(S): at 22:12

## 2019-01-15 RX ADMIN — Medication 800 MILLIGRAM(S): at 06:03

## 2019-01-15 RX ADMIN — Medication 800 MILLIGRAM(S): at 00:23

## 2019-01-15 RX ADMIN — Medication 100 MILLIGRAM(S): at 06:02

## 2019-01-15 RX ADMIN — Medication 800 MILLIGRAM(S): at 17:02

## 2019-01-15 RX ADMIN — BUDESONIDE AND FORMOTEROL FUMARATE DIHYDRATE 2 PUFF(S): 160; 4.5 AEROSOL RESPIRATORY (INHALATION) at 06:02

## 2019-01-15 RX ADMIN — Medication 100 MILLIGRAM(S): at 14:08

## 2019-01-15 NOTE — PROGRESS NOTE ADULT - SUBJECTIVE AND OBJECTIVE BOX
SUBJECTIVE: Overall improved. One loose nonbloody stool early this am with urgency but no bowel movements since. Tolerating full liquids. Still with mild abdominal pain, gassiness, and bloating sensation but symptoms not severe.     MEDICATIONS  (STANDING):  buDESOnide 160 MICROgram(s)/formoterol 4.5 MICROgram(s) Inhaler 2 Puff(s) Inhalation two times a day  famotidine Injectable 20 milliGRAM(s) IV Push every 24 hours  hydrocortisone sodium succinate Injectable 100 milliGRAM(s) IV Push every 8 hours  levothyroxine 25 MICROGram(s) Oral daily  mesalamine DR Capsule 800 milliGRAM(s) Oral four times a day    MEDICATIONS  (PRN):  ALBUTerol/ipratropium for Nebulization 3 milliLiter(s) Nebulizer every 6 hours PRN Shortness of Breath and/or Wheezing    OBJECTIVE:  Vital Signs Last 24 Hrs  T(C): 36.6 (15 Roberto 2019 12:09), Max: 37.1 (15 Roberto 2019 04:47)  T(F): 97.9 (15 Roberto 2019 12:09), Max: 98.7 (15 Roberto 2019 04:47)  HR: 63 (15 Roberto 2019 12:09) (62 - 70)  BP: 104/67 (15 Roberto 2019 12:09) (104/67 - 119/71)  BP(mean): --  RR: 17 (15 Roberto 2019 12:09) (17 - 18)  SpO2: 96% (15 Roberto 2019 12:09) (94% - 96%)    PHYSICAL EXAM:  Constitutional: A&OX3, in NAD,   HEENT: NCAT, no scleral icterus, no palpable LAD  Cardiovascular: RRR, S1 and S2, no audible murmurs  Respiratory: CTAB   Gastrointestinal: soft, bloated, TTP epigastrum/RUQ, normoactive bowel sounds, no palpable HSM  Extremities: No peripheral edema b/l     LABS:                        9.5    10.94 )-----------( 418      ( 15 Roberto 2019 08:10 )             29.9     01-15    142  |  102  |  6<L>  ----------------------------<  104<H>  4.1   |  29  |  0.67    Ca    8.6      15 Roberto 2019 06:08  Phos  3.6     01-14  Mg     2.1     01-15    LIVER FUNCTIONS - ( 11 Jan 2019 18:00 )  Alb: 4.0 g/dL / Pro: 8.4 g/dL / ALK PHOS: 71 U/L / ALT: 9 U/L / AST: 12 U/L / GGT: x             RADIOLOGY & ADDITIONAL TESTS:

## 2019-01-15 NOTE — CONSULT NOTE ADULT - ASSESSMENT
45 yo female with a PMH of bronchiectasis, hypothyroid, recently diagnosed with ulcerative colitis. She has been having diarrhea and bloating for the past 4 weeks with bright red blood in stool for the past 2 weeks. She endorses she had a colonoscopy 2 days ago and received a call notifying her of her UC diagnosis and was told to go to the ER for IV steroids. She states she her bronchiectasis has been under control and has had minimal sputum production since initiation of IV steroids. Reports she usually uses a chest vest at home and frequently has yellowish productive sputum. 43 yo female with a PMH of bronchiectasis, hypothyroid, recently diagnosed with ulcerative colitis. She has been having diarrhea and bloating for the past 4 weeks with bright red blood in stool for the past 2 weeks. She endorses she had a colonoscopy 2 days ago and received a call notifying her of her UC diagnosis and was told to go to the ER for IV steroids. She states she her bronchiectasis has been under control and has had minimal sputum production since initiation of IV steroids.

## 2019-01-15 NOTE — CONSULT NOTE ADULT - PROBLEM SELECTOR RECOMMENDATION 9
Management as per GI  -Currently on hydrocortisone 100mg Q8hrs and mesalamine DR four times a day Management as per GI  -Currently on hydrocortisone 100mg Q8hrs and mesalamine DR four times a day  -Currently tolerating full liquid diet

## 2019-01-15 NOTE — PROGRESS NOTE ADULT - PROBLEM SELECTOR PLAN 1
symptoms consistent w UC flare  IV hydrocortisone 100mg q8hr  - advanced to full liquid diet lactose free/ asacol added  -GI follow up appreciated.

## 2019-01-15 NOTE — PROGRESS NOTE ADULT - ASSESSMENT
45 yo female with flare up of ulcerative colitis. She is improved on IV steroids  1) continue Asacol  mg po qid  2) Continue IV steroids to complete 5 day course. Transition to oral tomorrow morning if continues to be improved  3) continue full liquids as tolerated  4) pain control as needed  5) Am labs including cbc, cmp, amylase, lipase  Above discussed with Dr. Giancarlo Landa MD 45 yo female with flare up of ulcerative colitis. She is improved on IV steroids  1) continue Asacol  mg po qid  2) Continue IV steroids to complete 5 day course. Transition to oral tomorrow morning if continues to be improved  3) continue full liquids as tolerated  4) Continue GI prophylaxis  5) Am labs including cbc, cmp, amylase, lipase  Appreciate pulmonary consultation  Above discussed with Dr. Giancarlo Landa

## 2019-01-15 NOTE — CONSULT NOTE ADULT - SUBJECTIVE AND OBJECTIVE BOX
PULMONARY CONSULT    HPI: 43 yo female with a PMH of bronchiectasis, hypothyroid, recently diagnosed with ulcerative colitis. She has been having diarrhea and bloating for the past 4 weeks with bright red blood in stool for the past 2 weeks. She endorses she had a colonoscopy 2 days ago and received a call notifying her of her UC diagnosis and was told to go to the ER for IV steroids. She states she her bronchiectasis has been under control and has had minimal sputum production since initiation of IV steroids. Reports she usually uses a chest vest at home and frequently has yellowish productive sputum. Denies fevers, chills, SOB, or cough.       PAST MEDICAL & SURGICAL HISTORY:  Ulcerative colitis  GERD (gastroesophageal reflux disease)  Allergic rhinitis  Bronchiectasis  Acquired hypothyroidism  Malignant melanoma of skin of right lower leyrs ago  H/O  section  S/P T&A (status post tonsillectomy and adenoidectomy)    Allergies    Augmentin (Rash)  sulfa drugs (Unknown)  sulfamethoxazole-trimethoprim (Rash)    Intolerances    vancomycin (Nausea; Hypotension)    FAMILY HISTORY:  Family history of diabetes mellitus (Grandparent)  Family history of thyroid cancer  Family history of hypothyroidism    Social history: former smoker, quit 10 years ago. 5 pack year hx.    Review of Systems:  CONSTITUTIONAL: No fever, chills, or fatigue  EYES: No eye pain, visual disturbances, or discharge  ENMT:  No difficulty hearing, tinnitus, vertigo; No sinus or throat pain  NECK: No pain or stiffness  RESPIRATORY: Per above  CARDIOVASCULAR: No chest pain, palpitations, dizziness, or leg swelling  GASTROINTESTINAL: per above  GENITOURINARY: No dysuria, frequency, hematuria, or incontinence  NEUROLOGICAL: No headaches, memory loss, loss of strength, numbness, or tremors  SKIN: No itching, burning, rashes, or lesions   MUSCULOSKELETAL: No joint pain or swelling; No muscle, back, or extremity pain  PSYCHIATRIC: No depression, anxiety, mood swings, or difficulty sleeping      Medications:  MEDICATIONS  (STANDING):  buDESOnide 160 MICROgram(s)/formoterol 4.5 MICROgram(s) Inhaler 2 Puff(s) Inhalation two times a day  famotidine Injectable 20 milliGRAM(s) IV Push every 24 hours  hydrocortisone sodium succinate Injectable 100 milliGRAM(s) IV Push every 8 hours  levothyroxine 25 MICROGram(s) Oral daily  mesalamine DR Capsule 800 milliGRAM(s) Oral four times a day    MEDICATIONS  (PRN):            Vital Signs Last 24 Hrs  T(C): 36.6 (15 Roberto 2019 12:09), Max: 37.1 (15 Roberto 2019 04:47)  T(F): 97.9 (15 Roberto 2019 12:09), Max: 98.7 (15 Roberto 2019 04:47)  HR: 63 (15 Roberto 2019 12:09) (62 - 70)  BP: 104/67 (15 Roberto 2019 12:09) (104/67 - 119/71)  BP(mean): --  RR: 17 (15 Roberto 2019 12:09) (17 - 18)  SpO2: 96% (15 Roberto 2019 12:09) (94% - 96%)             @ 07:01  -  01-15 @ 07:00  --------------------------------------------------------  IN: 1260 mL / OUT: 0 mL / NET: 1260 mL          LABS:                        9.5    10.94 )-----------( 418      ( 15 Roberto 2019 08:10 )             29.9     01-15    142  |  102  |  6<L>  ----------------------------<  104<H>  4.1   |  29  |  0.67    Ca    8.6      15 Roberto 2019 06:08  Phos  3.6       Mg     2.1     01-15            CULTURES: (if applicable)      Culture - Urine (collected 19 @ 20:28)  Source: .Urine Clean Catch (Midstream)  Final Report (19 @ 20:36):    No growth        Physical Examination:    General: No acute distress.      HEENT: Pupils equal, reactive to light.  Symmetric.    PULM: Clear to auscultation bilaterally, no significant sputum production    CVS: RRR    ABD: Soft, mild abdominal discomfort, hyperactive bowel sounds, no masses    EXT: No edema, nontender    SKIN: Warm and well perfused, no rashes noted.    NEURO: Alert, oriented, interactive, nonfocal PULMONARY CONSULT    HPI: 43 yo female with a PMH of bronchiectasis, hypothyroid, recently diagnosed with ulcerative colitis. She has been having diarrhea and bloating for the past 4 weeks with bright red blood in stool for the past 2 weeks. She endorses she had a colonoscopy 2 days ago and received a call notifying her of her UC diagnosis and was told to go to the ER for IV steroids. She states she her bronchiectasis has been under control and has had minimal sputum production since initiation of IV steroids. Reports she usually uses a chest vest at home and frequently has yellowish productive sputum. Denies fevers, chills, SOB, or cough.       PAST MEDICAL & SURGICAL HISTORY:  Ulcerative colitis  GERD (gastroesophageal reflux disease)  Allergic rhinitis  Bronchiectasis  Acquired hypothyroidism  Malignant melanoma of skin of right lower leyrs ago  H/O  section  S/P T&A (status post tonsillectomy and adenoidectomy)    Allergies    Augmentin (Rash)  sulfa drugs (Unknown)  sulfamethoxazole-trimethoprim (Rash)    Intolerances    vancomycin (Nausea; Hypotension)    FAMILY HISTORY:  Family history of diabetes mellitus (Grandparent)  Family history of thyroid cancer  Family history of hypothyroidism    Social history: former smoker, quit 10 years ago. 5 pack year hx.    Review of Systems:  CONSTITUTIONAL: No fever, chills, or fatigue  EYES: No eye pain, visual disturbances, or discharge  ENMT:  No difficulty hearing, tinnitus, vertigo; No sinus or throat pain  NECK: No pain or stiffness  RESPIRATORY: Per above  CARDIOVASCULAR: No chest pain, palpitations, dizziness, or leg swelling  GASTROINTESTINAL: per above  GENITOURINARY: No dysuria, frequency, hematuria, or incontinence  NEUROLOGICAL: No headaches, memory loss, loss of strength, numbness, or tremors  SKIN: No itching, burning, rashes, or lesions   MUSCULOSKELETAL: No joint pain or swelling; No muscle, back, or extremity pain  PSYCHIATRIC: No depression, anxiety, mood swings, or difficulty sleeping      Medications:  MEDICATIONS  (STANDING):  buDESOnide 160 MICROgram(s)/formoterol 4.5 MICROgram(s) Inhaler 2 Puff(s) Inhalation two times a day  famotidine Injectable 20 milliGRAM(s) IV Push every 24 hours  hydrocortisone sodium succinate Injectable 100 milliGRAM(s) IV Push every 8 hours  levothyroxine 25 MICROGram(s) Oral daily  mesalamine DR Capsule 800 milliGRAM(s) Oral four times a day    MEDICATIONS  (PRN):            Vital Signs Last 24 Hrs  T(C): 36.6 (15 Roberto 2019 12:09), Max: 37.1 (15 Roberto 2019 04:47)  T(F): 97.9 (15 Roberto 2019 12:09), Max: 98.7 (15 Roberto 2019 04:47)  HR: 63 (15 Roberto 2019 12:09) (62 - 70)  BP: 104/67 (15 Roberto 2019 12:09) (104/67 - 119/71)  BP(mean): --  RR: 17 (15 Roberto 2019 12:09) (17 - 18)  SpO2: 96% (15 Roberto 2019 12:09) (94% - 96%)             @ 07:01  -  01-15 @ 07:00  --------------------------------------------------------  IN: 1260 mL / OUT: 0 mL / NET: 1260 mL          LABS:                        9.5    10.94 )-----------( 418      ( 15 Roberto 2019 08:10 )             29.9     01-15    142  |  102  |  6<L>  ----------------------------<  104<H>  4.1   |  29  |  0.67    Ca    8.6      15 Roberto 2019 06:08  Phos  3.6       Mg     2.1     01-15            CULTURES: (if applicable)      Culture - Urine (collected 19 @ 20:28)  Source: .Urine Clean Catch (Midstream)  Final Report (19 @ 20:36):    No growth        Physical Examination:    General: No acute distress.      HEENT: Pupils equal, reactive to light.  Symmetric.    PULM: Scattered rhonchi at bases that clears with cough    CVS: RRR    ABD: Soft, mild abdominal discomfort, hyperactive bowel sounds, no masses    EXT: No edema, nontender    SKIN: Warm and well perfused, no rashes noted.    NEURO: Alert, oriented, interactive, nonfocal

## 2019-01-15 NOTE — CONSULT NOTE ADULT - PROBLEM SELECTOR RECOMMENDATION 2
Not currently exacerbated.  -Symbicort BID  -Duonebs Q6hrs for SOB Not currently exacerbated.  -Symbicort BID  -Duonebs PRN Q6hrs for SOB Patient has hx of recurrently bronchitis and PNA resulting in bronchiectasis. Not currently exacerbated.  -Symbicort BID  -Duonebs PRN Q6hrs for SOB

## 2019-01-15 NOTE — CONSULT NOTE ADULT - ATTENDING COMMENTS
Recent dx of UC in pt w hx of bronchectasis. CT chest progression from 1/16 w Bronchectasis on left to 5/16 with rt side pna. Current CT abd has bilateral lower lobe, lingula and rml bronchectasis with some mucus impaction.   Suggest CT chest wo contrast.  Check SAFIA.  History of some CF gene mutation but not CF, has normal cilia.  Will obtain records from [place]. Recent dx of UC in pt w hx of bronchectasis. CT chest progression from 1/16 w Bronchectasis on left to 5/16 with rt side pna. Current CT abd has bilateral lower lobe, lingula and rml bronchectasis with some mucus impaction.   Suggest CT chest wo contrast.  Check SAFIA.  History of some CF gene mutation but not CF, has normal cilia.  Will obtain records from [place] and [facility]

## 2019-01-15 NOTE — PROGRESS NOTE ADULT - SUBJECTIVE AND OBJECTIVE BOX
Patient is a 44y old  Female who presents with a chief complaint of UC flare (14 Jan 2019 21:40)      SUBJECTIVE / OVERNIGHT EVENTS:    had one episode of loose stool. some mild abdominal cramping. otherwise feels better tolerating full liquid diet    ROS:  14 point ROS negative in detail except stated as above    MEDICATIONS  (STANDING):  buDESOnide 160 MICROgram(s)/formoterol 4.5 MICROgram(s) Inhaler 2 Puff(s) Inhalation two times a day  famotidine Injectable 20 milliGRAM(s) IV Push every 24 hours  hydrocortisone sodium succinate Injectable 100 milliGRAM(s) IV Push every 8 hours  levothyroxine 25 MICROGram(s) Oral daily  mesalamine DR Capsule 800 milliGRAM(s) Oral four times a day    MEDICATIONS  (PRN):      CAPILLARY BLOOD GLUCOSE        I&O's Summary    14 Jan 2019 07:01  -  15 Roberto 2019 07:00  --------------------------------------------------------  IN: 1260 mL / OUT: 0 mL / NET: 1260 mL        PHYSICAL EXAM:  Vital Signs Last 24 Hrs  T(C): 37.1 (15 Roberto 2019 04:47), Max: 37.1 (15 Roberto 2019 04:47)  T(F): 98.7 (15 Roberto 2019 04:47), Max: 98.7 (15 Roberto 2019 04:47)  HR: 62 (15 Roberto 2019 04:47) (62 - 82)  BP: 119/71 (15 Roberto 2019 04:47) (114/74 - 119/71)  BP(mean): --  RR: 18 (15 Roberto 2019 04:47) (18 - 18)  SpO2: 96% (15 Roberto 2019 04:47) (94% - 97%)      GENERAL: NAD  CHEST/LUNG: Clear to auscultation bilaterally; No wheezes rales or rhonchi  HEART: s1 s2 regular rate and rhythm; No murmurs, rubs, or gallops  ABDOMEN: Soft, Nontender, Nondistended; Bowel sounds present  EXTREMITIES:  2+ Peripheral Pulses, No clubbing, cyanosis, or edema  NEUROLOGY: AAOx3; non-focal  SKIN: No rashes or lesions    LABS:                        9.5    10.94 )-----------( 418      ( 15 Roberto 2019 08:10 )             29.9     01-15    142  |  102  |  6<L>  ----------------------------<  104<H>  4.1   |  29  |  0.67    Ca    8.6      15 Roberto 2019 06:08  Phos  3.6     01-14  Mg     2.1     01-15                    Consultant(s) Notes Reviewed:    gi  Care Discussed with Consultants/Other Providers:  ANJELICA Lake

## 2019-01-16 LAB
ALBUMIN SERPL ELPH-MCNC: 3.1 G/DL — LOW (ref 3.3–5)
ALP SERPL-CCNC: 47 U/L — SIGNIFICANT CHANGE UP (ref 40–120)
ALT FLD-CCNC: 6 U/L — LOW (ref 10–45)
AMYLASE P1 CFR SERPL: 37 U/L — SIGNIFICANT CHANGE UP (ref 25–125)
ANION GAP SERPL CALC-SCNC: 12 MMOL/L — SIGNIFICANT CHANGE UP (ref 5–17)
AST SERPL-CCNC: 5 U/L — LOW (ref 10–40)
BILIRUB SERPL-MCNC: 0.5 MG/DL — SIGNIFICANT CHANGE UP (ref 0.2–1.2)
BUN SERPL-MCNC: 9 MG/DL — SIGNIFICANT CHANGE UP (ref 7–23)
CALCIUM SERPL-MCNC: 8.5 MG/DL — SIGNIFICANT CHANGE UP (ref 8.4–10.5)
CHLORIDE SERPL-SCNC: 99 MMOL/L — SIGNIFICANT CHANGE UP (ref 96–108)
CO2 SERPL-SCNC: 28 MMOL/L — SIGNIFICANT CHANGE UP (ref 22–31)
CREAT SERPL-MCNC: 0.63 MG/DL — SIGNIFICANT CHANGE UP (ref 0.5–1.3)
GLUCOSE SERPL-MCNC: 117 MG/DL — HIGH (ref 70–99)
HCT VFR BLD CALC: 30.5 % — LOW (ref 34.5–45)
HGB BLD-MCNC: 9.8 G/DL — LOW (ref 11.5–15.5)
LIDOCAIN IGE QN: 13 U/L — SIGNIFICANT CHANGE UP (ref 7–60)
MCHC RBC-ENTMCNC: 28.7 PG — SIGNIFICANT CHANGE UP (ref 27–34)
MCHC RBC-ENTMCNC: 32.1 GM/DL — SIGNIFICANT CHANGE UP (ref 32–36)
MCV RBC AUTO: 89.4 FL — SIGNIFICANT CHANGE UP (ref 80–100)
PLATELET # BLD AUTO: 478 K/UL — HIGH (ref 150–400)
POTASSIUM SERPL-MCNC: 3.2 MMOL/L — LOW (ref 3.5–5.3)
POTASSIUM SERPL-SCNC: 3.2 MMOL/L — LOW (ref 3.5–5.3)
PROT SERPL-MCNC: 6.3 G/DL — SIGNIFICANT CHANGE UP (ref 6–8.3)
RBC # BLD: 3.41 M/UL — LOW (ref 3.8–5.2)
RBC # FLD: 13.9 % — SIGNIFICANT CHANGE UP (ref 10.3–14.5)
SODIUM SERPL-SCNC: 139 MMOL/L — SIGNIFICANT CHANGE UP (ref 135–145)
WBC # BLD: 7.38 K/UL — SIGNIFICANT CHANGE UP (ref 3.8–10.5)
WBC # FLD AUTO: 7.38 K/UL — SIGNIFICANT CHANGE UP (ref 3.8–10.5)

## 2019-01-16 PROCEDURE — 99233 SBSQ HOSP IP/OBS HIGH 50: CPT

## 2019-01-16 RX ORDER — SIMETHICONE 80 MG/1
80 TABLET, CHEWABLE ORAL ONCE
Qty: 0 | Refills: 0 | Status: COMPLETED | OUTPATIENT
Start: 2019-01-16 | End: 2019-01-16

## 2019-01-16 RX ORDER — POTASSIUM CHLORIDE 20 MEQ
20 PACKET (EA) ORAL ONCE
Qty: 0 | Refills: 0 | Status: COMPLETED | OUTPATIENT
Start: 2019-01-16 | End: 2019-01-16

## 2019-01-16 RX ORDER — HYDROCORTISONE 20 MG
100 TABLET ORAL ONCE
Qty: 0 | Refills: 0 | Status: COMPLETED | OUTPATIENT
Start: 2019-01-16 | End: 2019-01-16

## 2019-01-16 RX ADMIN — Medication 800 MILLIGRAM(S): at 11:15

## 2019-01-16 RX ADMIN — Medication 100 MILLIGRAM(S): at 05:22

## 2019-01-16 RX ADMIN — Medication 800 MILLIGRAM(S): at 17:35

## 2019-01-16 RX ADMIN — Medication 800 MILLIGRAM(S): at 05:22

## 2019-01-16 RX ADMIN — SIMETHICONE 80 MILLIGRAM(S): 80 TABLET, CHEWABLE ORAL at 20:14

## 2019-01-16 RX ADMIN — Medication 100 MILLIGRAM(S): at 14:58

## 2019-01-16 RX ADMIN — Medication 100 MILLIGRAM(S): at 20:14

## 2019-01-16 RX ADMIN — BUDESONIDE AND FORMOTEROL FUMARATE DIHYDRATE 2 PUFF(S): 160; 4.5 AEROSOL RESPIRATORY (INHALATION) at 05:22

## 2019-01-16 RX ADMIN — FAMOTIDINE 20 MILLIGRAM(S): 10 INJECTION INTRAVENOUS at 05:22

## 2019-01-16 RX ADMIN — Medication 20 MILLIEQUIVALENT(S): at 11:14

## 2019-01-16 RX ADMIN — Medication 800 MILLIGRAM(S): at 23:21

## 2019-01-16 NOTE — PROGRESS NOTE ADULT - PROBLEM SELECTOR PLAN 2
Recent dx of UC in pt w hx of bronchiectasis. CT chest progression from 1/16 w Bronchiectasis on left to 5/16 with rt side pna. Current CT abd has bilateral lower lobe, lingula and RML bronchiectasis with some mucus impaction.   -History of some CF gene mutation but not CF, has normal cilia. Patient was previously seen as an outpatient at [facility]. Will obtain records.  -Symbicort BID  -Duonebs PRN Q6hrs for SOB.   -SAFIA pending

## 2019-01-16 NOTE — PROGRESS NOTE ADULT - ASSESSMENT
45 yo female with flare up of ulcerative colitis. She is much improved on IV steroids  1) continue Asacol  mg po qid  2) Transition to oral prednisone today  3) advance to lactose free low residue diet as toelrated  4) Continue GI prophylaxis  5) Am labs   6)nutrition consultation  Appreciate pulmonary consultation  Above discussed with Dr. Giancarlo Landa

## 2019-01-16 NOTE — PROGRESS NOTE ADULT - SUBJECTIVE AND OBJECTIVE BOX
Patient is a 44y old  Female who presents with a chief complaint of UC flare (15 Roberto 2019 13:25)      SUBJECTIVE / OVERNIGHT EVENTS:    patient seen and examined. feels well. tolerating full liquid diet. wants regular diet in the afternoon one loose stool BM     ROS:  14 point ROS negative in detail except stated as above    MEDICATIONS  (STANDING):  buDESOnide 160 MICROgram(s)/formoterol 4.5 MICROgram(s) Inhaler 2 Puff(s) Inhalation two times a day  famotidine Injectable 20 milliGRAM(s) IV Push every 24 hours  hydrocortisone sodium succinate Injectable 100 milliGRAM(s) IV Push every 8 hours  levothyroxine 25 MICROGram(s) Oral daily  mesalamine DR Capsule 800 milliGRAM(s) Oral four times a day  potassium chloride    Tablet ER 20 milliEquivalent(s) Oral once    MEDICATIONS  (PRN):  ALBUTerol/ipratropium for Nebulization 3 milliLiter(s) Nebulizer every 6 hours PRN Shortness of Breath and/or Wheezing      CAPILLARY BLOOD GLUCOSE        I&O's Summary    15 Roberto 2019 07:01  -  16 Jan 2019 07:00  --------------------------------------------------------  IN: 1320 mL / OUT: 0 mL / NET: 1320 mL        PHYSICAL EXAM:  Vital Signs Last 24 Hrs  T(C): 36.8 (16 Jan 2019 05:17), Max: 36.8 (15 Roberto 2019 20:49)  T(F): 98.2 (16 Jan 2019 05:17), Max: 98.2 (15 Roberto 2019 20:49)  HR: 60 (16 Jan 2019 05:17) (60 - 63)  BP: 111/70 (16 Jan 2019 05:17) (104/67 - 111/70)  BP(mean): --  RR: 18 (16 Jan 2019 05:17) (17 - 18)  SpO2: 96% (16 Jan 2019 05:17) (96% - 96%)    GENERAL: NAD  CHEST/LUNG: Clear to auscultation bilaterally; No wheezes rales or rhonchi  HEART: s1 s2 regular rate and rhythm; No murmurs, rubs, or gallops  ABDOMEN: Soft, Nontender, Nondistended; Bowel sounds present  EXTREMITIES:  2+ Peripheral Pulses, No clubbing, cyanosis, or edema  NEUROLOGY: AAOx3; non-focal  SKIN: No rashes or lesions    LABS:                        9.8    7.38  )-----------( 478      ( 16 Jan 2019 08:28 )             30.5     01-16    139  |  99  |  9   ----------------------------<  117<H>  3.2<L>   |  28  |  0.63    Ca    8.5      16 Jan 2019 07:02  Mg     2.1     01-15    TPro  6.3  /  Alb  3.1<L>  /  TBili  0.5  /  DBili  x   /  AST  5<L>  /  ALT  6<L>  /  AlkPhos  47  01-16                Consultant(s) Notes Reviewed:    GI  Care Discussed with Consultants/Other Providers:  ANJELICA Fernandez

## 2019-01-16 NOTE — PROGRESS NOTE ADULT - SUBJECTIVE AND OBJECTIVE BOX
SUBJECTIVE: 3 semiformed nonbloody bowel movements today. Still with abdominal bloating and gassiness. Mild discomfort but is not requesting any pain medication. ongoing mid back pain.    MEDICATIONS  (STANDING):  buDESOnide 160 MICROgram(s)/formoterol 4.5 MICROgram(s) Inhaler 2 Puff(s) Inhalation two times a day  famotidine Injectable 20 milliGRAM(s) IV Push every 24 hours  levothyroxine 25 MICROGram(s) Oral daily  mesalamine DR Capsule 800 milliGRAM(s) Oral four times a day  predniSONE   Tablet 40 milliGRAM(s) Oral daily    MEDICATIONS  (PRN):  ALBUTerol/ipratropium for Nebulization 3 milliLiter(s) Nebulizer every 6 hours PRN Shortness of Breath and/or Wheezing      OBJECTIVE:  Vital Signs Last 24 Hrs  T(C): 37 (16 Jan 2019 12:49), Max: 37 (16 Jan 2019 12:49)  T(F): 98.6 (16 Jan 2019 12:49), Max: 98.6 (16 Jan 2019 12:49)  HR: 64 (16 Jan 2019 12:49) (60 - 64)  BP: 118/79 (16 Jan 2019 12:49) (106/72 - 118/79)  BP(mean): --  RR: 18 (16 Jan 2019 12:49) (18 - 18)  SpO2: 94% (16 Jan 2019 12:49) (94% - 96%)    PHYSICAL EXAM:  Constitutional: A&OX3, in NAD,   HEENT: NCAT, no scleral icterus, no palpable LAD  Cardiovascular: RRR, S1 and S2, no audible murmurs  Respiratory: CTAB   Gastrointestinal: softly distended, nontender, normoactive bowel sounds, no palpable HSM  Extremities: No peripheral edema b/l     LABS:                        9.8    7.38  )-----------( 478      ( 16 Jan 2019 08:28 )             30.5     01-16    139  |  99  |  9   ----------------------------<  117<H>  3.2<L>   |  28  |  0.63    Ca    8.5      16 Jan 2019 07:02  Mg     2.1     01-15    TPro  6.3  /  Alb  3.1<L>  /  TBili  0.5  /  DBili  x   /  AST  5<L>  /  ALT  6<L>  /  AlkPhos  47  01-16        LIVER FUNCTIONS - ( 16 Jan 2019 07:02 )  Alb: 3.1 g/dL / Pro: 6.3 g/dL / ALK PHOS: 47 U/L / ALT: 6 U/L / AST: 5 U/L / GGT: x             RADIOLOGY & ADDITIONAL TESTS:  < from: CT Chest No Cont (01.15.19 @ 18:36) >  T:     LUNGS AND LARGE AIRWAYS: Patent central airways.  Scattered tree-in-bud   and patchy nodular opacities predominantly within the right middle and   bilateral lower lobes. Bilateral lower lobe mucoid impacted distal   airways and slight left lower lobe bronchial wall thickening. Unchanged   mild bilateral lower lobe bronchiectasis.  PLEURA: No pleural effusion. No pneumothorax.  VESSELS: Within normal limits.  HEART: Heart size is normal. No pericardial effusion.  MEDIASTINUM AND BIBIANA: No lymphadenopathy.  CHEST WALL AND LOWER NECK: Within normal limits.  VISUALIZED UPPER ABDOMEN: Within normal limits.  BONES: Within normal limits.    IMPRESSION:   Bilateral lower lobe predominant tree-in-bud and nodular airspace   opacities represents mucoid impaction with or without superimposed   infectious/inflammatory process. Three-month follow-up CT recommended for  complete evaluation.    < end of copied text >

## 2019-01-16 NOTE — PROGRESS NOTE ADULT - PROBLEM SELECTOR PLAN 1
Management as per GI  -Currently on hydrocortisone 100mg Q8hrs and mesalamine DR four times a day  -Currently tolerating full liquid diet. Management as per GI  -Currently on hydrocortisone / Steroids and mesalamine DR four times a day  -Currently tolerating full liquid diet.

## 2019-01-16 NOTE — PROGRESS NOTE ADULT - ASSESSMENT
45 yo female with a PMH of bronchiectasis, hypothyroid, recently diagnosed with ulcerative colitis. She has been having diarrhea and bloating for the past 4 weeks with bright red blood in stool for the past 2 weeks. She endorses she had a colonoscopy 2 days ago and received a call notifying her of her UC diagnosis and was told to go to the ER for IV steroids. She states she her bronchiectasis has been under control and has had minimal sputum production since initiation of IV steroids.

## 2019-01-16 NOTE — PROGRESS NOTE ADULT - SUBJECTIVE AND OBJECTIVE BOX
Follow-up Pulm Progress Note    No new respiratory events overnight.    Sats 97% on RA.  +non-productive cough.  Denies SOB/CP.     Medications:  MEDICATIONS  (STANDING):  buDESOnide 160 MICROgram(s)/formoterol 4.5 MICROgram(s) Inhaler 2 Puff(s) Inhalation two times a day  famotidine Injectable 20 milliGRAM(s) IV Push every 24 hours  hydrocortisone sodium succinate Injectable 100 milliGRAM(s) IV Push every 8 hours  levothyroxine 25 MICROGram(s) Oral daily  mesalamine DR Capsule 800 milliGRAM(s) Oral four times a day    MEDICATIONS  (PRN):  ALBUTerol/ipratropium for Nebulization 3 milliLiter(s) Nebulizer every 6 hours PRN Shortness of Breath and/or Wheezing      Vital Signs Last 24 Hrs  T(C): 37 (16 Jan 2019 12:49), Max: 37 (16 Jan 2019 12:49)  T(F): 98.6 (16 Jan 2019 12:49), Max: 98.6 (16 Jan 2019 12:49)  HR: 64 (16 Jan 2019 12:49) (60 - 64)  BP: 118/79 (16 Jan 2019 12:49) (106/72 - 118/79)  BP(mean): --  RR: 18 (16 Jan 2019 12:49) (18 - 18)  SpO2: 94% (16 Jan 2019 12:49) (94% - 96%)        01-15 @ 07:01  -  01-16 @ 07:00  --------------------------------------------------------  IN: 1320 mL / OUT: 0 mL / NET: 1320 mL        LABS:                        9.8    7.38  )-----------( 478      ( 16 Jan 2019 08:28 )             30.5     01-16    139  |  99  |  9   ----------------------------<  117<H>  3.2<L>   |  28  |  0.63    Ca    8.5      16 Jan 2019 07:02  Mg     2.1     01-15    TPro  6.3  /  Alb  3.1<L>  /  TBili  0.5  /  DBili  x   /  AST  5<L>  /  ALT  6<L>  /  AlkPhos  47  01-16        CULTURES:      Culture - Urine (collected 01-11-19 @ 20:28)  Source: .Urine Clean Catch (Midstream)  Final Report (01-12-19 @ 20:36):    No growth        Physical Examination:  PULM: scattered rhonchi at bases, clears with cough  CVS: RRR    RADIOLOGY REVIEWED  CT chest:   LUNGS AND LARGE AIRWAYS: Patent central airways.  Scattered tree-in-bud   and patchy nodular opacities predominantly within the right middle and   bilateral lower lobes. Bilateral lower lobe mucoid impacted distal   airways and slight left lower lobe bronchial wall thickening. Unchanged   mild bilateral lower lobe bronchiectasis.    IMPRESSION:   Bilateral lower lobe predominant tree-in-bud and nodular airspace   opacities represents mucoid impaction with or without superimposed   infectious/inflammatory process. Three-month follow-up CT recommended for  complete evaluation. Follow-up Pulm Progress Note    No new respiratory events overnight.    Sats 97% on RA.  +non-productive cough.  Denies SOB/CP.     patient complaining of B/L Left > Right Flank pain.   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 and states she is + Anca    Medications:  MEDICATIONS  (STANDING):  buDESOnide 160 MICROgram(s)/formoterol 4.5 MICROgram(s) Inhaler 2 Puff(s) Inhalation two times a day  famotidine Injectable 20 milliGRAM(s) IV Push every 24 hours  hydrocortisone sodium succinate Injectable 100 milliGRAM(s) IV Push every 8 hours  levothyroxine 25 MICROGram(s) Oral daily  mesalamine DR Capsule 800 milliGRAM(s) Oral four times a day    MEDICATIONS  (PRN):  ALBUTerol/ipratropium for Nebulization 3 milliLiter(s) Nebulizer every 6 hours PRN Shortness of Breath and/or Wheezing      Vital Signs Last 24 Hrs  T(C): 37 (16 Jan 2019 12:49), Max: 37 (16 Jan 2019 12:49)  T(F): 98.6 (16 Jan 2019 12:49), Max: 98.6 (16 Jan 2019 12:49)  HR: 64 (16 Jan 2019 12:49) (60 - 64)  BP: 118/79 (16 Jan 2019 12:49) (106/72 - 118/79)  BP(mean): --  RR: 18 (16 Jan 2019 12:49) (18 - 18)  SpO2: 94% (16 Jan 2019 12:49) (94% - 96%)        01-15 @ 07:01  -  01-16 @ 07:00  --------------------------------------------------------  IN: 1320 mL / OUT: 0 mL / NET: 1320 mL        LABS:                        9.8    7.38  )-----------( 478      ( 16 Jan 2019 08:28 )             30.5     01-16    139  |  99  |  9   ----------------------------<  117<H>  3.2<L>   |  28  |  0.63    Ca    8.5      16 Jan 2019 07:02  Mg     2.1     01-15    TPro  6.3  /  Alb  3.1<L>  /  TBili  0.5  /  DBili  x   /  AST  5<L>  /  ALT  6<L>  /  AlkPhos  47  01-16        CULTURES:      Culture - Urine (collected 01-11-19 @ 20:28)  Source: .Urine Clean Catch (Midstream)  Final Report (01-12-19 @ 20:36):    No growth        Physical Examination:  PULM: scattered rhonchi at bases, clears with cough  CVS: RRR    RADIOLOGY REVIEWED  CT chest:   LUNGS AND LARGE AIRWAYS: Patent central airways.  Scattered tree-in-bud   and patchy nodular opacities predominantly within the right middle and   bilateral lower lobes. Bilateral lower lobe mucoid impacted distal   airways and slight left lower lobe bronchial wall thickening. Unchanged   mild bilateral lower lobe bronchiectasis.    IMPRESSION:   Bilateral lower lobe predominant tree-in-bud and nodular airspace   opacities represents mucoid impaction with or without superimposed   infectious/inflammatory process. Three-month follow-up CT recommended for  complete evaluation.

## 2019-01-16 NOTE — PROGRESS NOTE ADULT - PROBLEM SELECTOR PLAN 3
Pt states she only  uses nebs as needed at home. Respiratory symptoms at baseline.  -Cont. symbicort Pt states she only  uses nebs as needed at home. Respiratory symptoms at baseline.  -Cont. symbicort  - [facility] pending

## 2019-01-16 NOTE — PROGRESS NOTE ADULT - PROBLEM SELECTOR PLAN 1
symptoms consistent w UC flare  IV hydrocortisone 100mg q8hr  - full liquid diet lactose free/ asacol qid  - regular diet after lunch  -GI follow up appreciated.

## 2019-01-17 VITALS
TEMPERATURE: 98 F | RESPIRATION RATE: 18 BRPM | DIASTOLIC BLOOD PRESSURE: 72 MMHG | OXYGEN SATURATION: 94 % | SYSTOLIC BLOOD PRESSURE: 111 MMHG | HEART RATE: 69 BPM

## 2019-01-17 LAB
ANION GAP SERPL CALC-SCNC: 10 MMOL/L — SIGNIFICANT CHANGE UP (ref 5–17)
BUN SERPL-MCNC: 12 MG/DL — SIGNIFICANT CHANGE UP (ref 7–23)
CALCIUM SERPL-MCNC: 8.7 MG/DL — SIGNIFICANT CHANGE UP (ref 8.4–10.5)
CHLORIDE SERPL-SCNC: 98 MMOL/L — SIGNIFICANT CHANGE UP (ref 96–108)
CO2 SERPL-SCNC: 29 MMOL/L — SIGNIFICANT CHANGE UP (ref 22–31)
CREAT SERPL-MCNC: 0.62 MG/DL — SIGNIFICANT CHANGE UP (ref 0.5–1.3)
GLUCOSE SERPL-MCNC: 102 MG/DL — HIGH (ref 70–99)
HCT VFR BLD CALC: 32.1 % — LOW (ref 34.5–45)
HGB BLD-MCNC: 10.3 G/DL — LOW (ref 11.5–15.5)
MCHC RBC-ENTMCNC: 28.5 PG — SIGNIFICANT CHANGE UP (ref 27–34)
MCHC RBC-ENTMCNC: 32.1 GM/DL — SIGNIFICANT CHANGE UP (ref 32–36)
MCV RBC AUTO: 88.9 FL — SIGNIFICANT CHANGE UP (ref 80–100)
PLATELET # BLD AUTO: 523 K/UL — HIGH (ref 150–400)
POTASSIUM SERPL-MCNC: 3.6 MMOL/L — SIGNIFICANT CHANGE UP (ref 3.5–5.3)
POTASSIUM SERPL-SCNC: 3.6 MMOL/L — SIGNIFICANT CHANGE UP (ref 3.5–5.3)
RBC # BLD: 3.61 M/UL — LOW (ref 3.8–5.2)
RBC # FLD: 13.7 % — SIGNIFICANT CHANGE UP (ref 10.3–14.5)
SODIUM SERPL-SCNC: 137 MMOL/L — SIGNIFICANT CHANGE UP (ref 135–145)
WBC # BLD: 12.25 K/UL — HIGH (ref 3.8–10.5)
WBC # FLD AUTO: 12.25 K/UL — HIGH (ref 3.8–10.5)

## 2019-01-17 PROCEDURE — 82330 ASSAY OF CALCIUM: CPT

## 2019-01-17 PROCEDURE — 84100 ASSAY OF PHOSPHORUS: CPT

## 2019-01-17 PROCEDURE — 85027 COMPLETE CBC AUTOMATED: CPT

## 2019-01-17 PROCEDURE — 84145 PROCALCITONIN (PCT): CPT

## 2019-01-17 PROCEDURE — 87086 URINE CULTURE/COLONY COUNT: CPT

## 2019-01-17 PROCEDURE — 80048 BASIC METABOLIC PNL TOTAL CA: CPT

## 2019-01-17 PROCEDURE — 85652 RBC SED RATE AUTOMATED: CPT

## 2019-01-17 PROCEDURE — 84295 ASSAY OF SERUM SODIUM: CPT

## 2019-01-17 PROCEDURE — 83690 ASSAY OF LIPASE: CPT

## 2019-01-17 PROCEDURE — 96374 THER/PROPH/DIAG INJ IV PUSH: CPT

## 2019-01-17 PROCEDURE — 86038 ANTINUCLEAR ANTIBODIES: CPT

## 2019-01-17 PROCEDURE — 85014 HEMATOCRIT: CPT

## 2019-01-17 PROCEDURE — 82150 ASSAY OF AMYLASE: CPT

## 2019-01-17 PROCEDURE — 82435 ASSAY OF BLOOD CHLORIDE: CPT

## 2019-01-17 PROCEDURE — 85610 PROTHROMBIN TIME: CPT

## 2019-01-17 PROCEDURE — 87798 DETECT AGENT NOS DNA AMP: CPT

## 2019-01-17 PROCEDURE — 81001 URINALYSIS AUTO W/SCOPE: CPT

## 2019-01-17 PROCEDURE — 99285 EMERGENCY DEPT VISIT HI MDM: CPT | Mod: 25

## 2019-01-17 PROCEDURE — 82803 BLOOD GASES ANY COMBINATION: CPT

## 2019-01-17 PROCEDURE — 87633 RESP VIRUS 12-25 TARGETS: CPT

## 2019-01-17 PROCEDURE — 86140 C-REACTIVE PROTEIN: CPT

## 2019-01-17 PROCEDURE — 94640 AIRWAY INHALATION TREATMENT: CPT

## 2019-01-17 PROCEDURE — 99239 HOSP IP/OBS DSCHRG MGMT >30: CPT

## 2019-01-17 PROCEDURE — 87581 M.PNEUMON DNA AMP PROBE: CPT

## 2019-01-17 PROCEDURE — 85730 THROMBOPLASTIN TIME PARTIAL: CPT

## 2019-01-17 PROCEDURE — 83605 ASSAY OF LACTIC ACID: CPT

## 2019-01-17 PROCEDURE — 80053 COMPREHEN METABOLIC PANEL: CPT

## 2019-01-17 PROCEDURE — 87486 CHLMYD PNEUM DNA AMP PROBE: CPT

## 2019-01-17 PROCEDURE — 83036 HEMOGLOBIN GLYCOSYLATED A1C: CPT

## 2019-01-17 PROCEDURE — 83735 ASSAY OF MAGNESIUM: CPT

## 2019-01-17 PROCEDURE — 71250 CT THORAX DX C-: CPT

## 2019-01-17 PROCEDURE — 82947 ASSAY GLUCOSE BLOOD QUANT: CPT

## 2019-01-17 PROCEDURE — 84132 ASSAY OF SERUM POTASSIUM: CPT

## 2019-01-17 PROCEDURE — 84702 CHORIONIC GONADOTROPIN TEST: CPT

## 2019-01-17 RX ORDER — MESALAMINE 400 MG
2 TABLET, DELAYED RELEASE (ENTERIC COATED) ORAL
Qty: 0 | Refills: 0 | COMMUNITY
Start: 2019-01-17

## 2019-01-17 RX ORDER — MESALAMINE 400 MG
1 TABLET, DELAYED RELEASE (ENTERIC COATED) ORAL
Qty: 24 | Refills: 0 | OUTPATIENT
Start: 2019-01-17 | End: 2019-01-22

## 2019-01-17 RX ADMIN — Medication 800 MILLIGRAM(S): at 05:37

## 2019-01-17 RX ADMIN — Medication 800 MILLIGRAM(S): at 11:06

## 2019-01-17 RX ADMIN — Medication 40 MILLIGRAM(S): at 05:37

## 2019-01-17 RX ADMIN — BUDESONIDE AND FORMOTEROL FUMARATE DIHYDRATE 2 PUFF(S): 160; 4.5 AEROSOL RESPIRATORY (INHALATION) at 05:37

## 2019-01-17 RX ADMIN — FAMOTIDINE 20 MILLIGRAM(S): 10 INJECTION INTRAVENOUS at 05:37

## 2019-01-17 NOTE — DIETITIAN INITIAL EVALUATION ADULT. - ENERGY NEEDS
Ht: 66 inches Wt: 142 pounds BMI: 22.9 kg/m2 IBW: 130 (+/-10%) 109.2 %IBW  Pertinent information: Pt 45 y/o F with PMH: bronchiectasis, hypothyroidism, recent diagnosis with ulcerative colitis, allergic rhinitis, GERD, admitted with ulcerative colitis flare, diarrhea - improving on steroids.   No noted edema as per flow sheets. Skin: no noted pressure injuries as per documentation.

## 2019-01-17 NOTE — PROGRESS NOTE ADULT - PROBLEM SELECTOR PLAN 1
symptoms consistent w UC flare  IV hydrocortisone 100mg q8hr transitioned to prednisone 40mg   asacol qid  - regular diet low fiber low residue  -GI follow up appreciated.

## 2019-01-17 NOTE — PROGRESS NOTE ADULT - PROBLEM SELECTOR PLAN 3
Pt states she only  uses nebs as needed at home. Respiratory symptoms at baseline.  -Cont. symbicort  - [facility] pending

## 2019-01-17 NOTE — PROGRESS NOTE ADULT - SUBJECTIVE AND OBJECTIVE BOX
Patient is a 44y old  Female who presents with a chief complaint of UC flare (16 Jan 2019 15:33)      SUBJECTIVE / OVERNIGHT EVENTS:    patient seen and examined. feels well. had one semi loose stool w blood. but feels a lot better and wants to go home    ROS:  14 point ROS negative in detail except stated as above    MEDICATIONS  (STANDING):  buDESOnide 160 MICROgram(s)/formoterol 4.5 MICROgram(s) Inhaler 2 Puff(s) Inhalation two times a day  famotidine Injectable 20 milliGRAM(s) IV Push every 24 hours  levothyroxine 25 MICROGram(s) Oral daily  mesalamine DR Capsule 800 milliGRAM(s) Oral four times a day  predniSONE   Tablet 40 milliGRAM(s) Oral daily    MEDICATIONS  (PRN):  ALBUTerol/ipratropium for Nebulization 3 milliLiter(s) Nebulizer every 6 hours PRN Shortness of Breath and/or Wheezing      CAPILLARY BLOOD GLUCOSE        I&O's Summary    16 Jan 2019 07:01  -  17 Jan 2019 07:00  --------------------------------------------------------  IN: 720 mL / OUT: 0 mL / NET: 720 mL    17 Jan 2019 07:01  -  17 Jan 2019 11:57  --------------------------------------------------------  IN: 480 mL / OUT: 0 mL / NET: 480 mL        PHYSICAL EXAM:  Vital Signs Last 24 Hrs  T(C): 36.8 (17 Jan 2019 05:48), Max: 37.1 (16 Jan 2019 21:22)  T(F): 98.2 (17 Jan 2019 05:48), Max: 98.8 (16 Jan 2019 21:22)  HR: 80 (17 Jan 2019 05:48) (62 - 80)  BP: 125/81 (17 Jan 2019 05:48) (109/64 - 125/81)  BP(mean): --  RR: 18 (17 Jan 2019 05:48) (18 - 18)  SpO2: 95% (17 Jan 2019 05:48) (94% - 95%)    GENERAL: NAD  CHEST/LUNG: Clear to auscultation bilaterally; No wheezes rales or rhonchi  HEART: s1 s2 regular rate and rhythm; No murmurs, rubs, or gallops  ABDOMEN: Soft, Nontender, Nondistended; Bowel sounds present  EXTREMITIES:  2+ Peripheral Pulses, No clubbing, cyanosis, or edema  NEUROLOGY: AAOx3; non-focal  SKIN: No rashes or lesions          LABS:                        10.3   12.25 )-----------( 523      ( 17 Jan 2019 07:58 )             32.1     01-17    137  |  98  |  12  ----------------------------<  102<H>  3.6   |  29  |  0.62    Ca    8.7      17 Jan 2019 06:49    TPro  6.3  /  Alb  3.1<L>  /  TBili  0.5  /  DBili  x   /  AST  5<L>  /  ALT  6<L>  /  AlkPhos  47  01-16              Consultant(s) Notes Reviewed:    gi  Care Discussed with Consultants/Other Providers:  ANJELICA abreu

## 2019-01-17 NOTE — DIETITIAN INITIAL EVALUATION ADULT. - NS AS NUTRI INTERV MEALS SNACK
Recommend continue diet as above. Encourage PO intake, obtain food preferences, provide feeding assistance as needed.

## 2019-01-17 NOTE — DIETITIAN INITIAL EVALUATION ADULT. - NS FNS WEIGHT CHANGE REASON
Pt reports 10 pounds weight loss x 4 weeks PTA due to fluid losses caused by diarrhea, from 147 to 137 pounds, followed by weight gain to 142 pounds this am (01/17). Weight as per previous RD note (05/11/2016) 143 pounds. Weight as per flow sheets (01/11) 136 pounds -> (01/17) 142 pounds - consistent with pt's history.

## 2019-01-17 NOTE — PROGRESS NOTE ADULT - PROBLEM SELECTOR PLAN 2
Recent dx of UC in pt w hx of bronchiectasis. CT chest progression from 1/16 w Bronchiectasis on left to 5/16 with rt side pna. Current CT abd has bilateral lower lobe, lingula and RML bronchiectasis with some mucus impaction.   -History of some CF gene mutation but not CF, has normal cilia. Patient was previously seen as an outpatient at [facility]. Will obtain records.  -Symbicort BID  -Duonebs PRN Q6hrs for SOB.   -SAFIA pending, consider rheum eval  -F/u Dr. Kam outpatient

## 2019-01-17 NOTE — PROGRESS NOTE ADULT - SUBJECTIVE AND OBJECTIVE BOX
INTERVAL HPI/OVERNIGHT EVENTS:    Improved BM with more solid stools, little blood.  Tolerated solids.    MEDICATIONS  (STANDING):  buDESOnide 160 MICROgram(s)/formoterol 4.5 MICROgram(s) Inhaler 2 Puff(s) Inhalation two times a day  famotidine Injectable 20 milliGRAM(s) IV Push every 24 hours  levothyroxine 25 MICROGram(s) Oral daily  mesalamine DR Capsule 800 milliGRAM(s) Oral four times a day  predniSONE   Tablet 40 milliGRAM(s) Oral daily    MEDICATIONS  (PRN):  ALBUTerol/ipratropium for Nebulization 3 milliLiter(s) Nebulizer every 6 hours PRN Shortness of Breath and/or Wheezing      Allergies    Augmentin (Rash)  sulfa drugs (Unknown)  sulfamethoxazole-trimethoprim (Rash)    Intolerances    vancomycin (Nausea; Hypotension)      Review of Systems: No fever or chills    Vital Signs Last 24 Hrs  T(C): 36.9 (17 Jan 2019 12:02), Max: 37.1 (16 Jan 2019 21:22)  T(F): 98.5 (17 Jan 2019 12:02), Max: 98.8 (16 Jan 2019 21:22)  HR: 69 (17 Jan 2019 12:02) (62 - 80)  BP: 111/72 (17 Jan 2019 12:02) (109/64 - 125/81)  BP(mean): --  RR: 18 (17 Jan 2019 12:02) (18 - 18)  SpO2: 94% (17 Jan 2019 12:02) (94% - 95%)    PHYSICAL EXAM:  Constitutional: NAD, well-developed  Neck: No LAD, supple  Respiratory: CTAB  Cardiovascular: S1 and S2, RRR,   Gastrointestinal: BS+, soft, NT/ND, neg HSM,  Extremities: No peripheral edema, neg clubing, cyanosis  Vascular: 2+ peripheral pulses  Neurological: A/O x 3, no focal deficits  Psychiatric: Normal mood, normal affect  Skin: No rashes    LABS:                        10.3   12.25 )-----------( 523      ( 17 Jan 2019 07:58 )             32.1     01-17    137  |  98  |  12  ----------------------------<  102<H>  3.6   |  29  |  0.62    Ca    8.7      17 Jan 2019 06:49    TPro  6.3  /  Alb  3.1<L>  /  TBili  0.5  /  DBili  x   /  AST  5<L>  /  ALT  6<L>  /  AlkPhos  47  01-16        LIVER FUNCTIONS - ( 16 Jan 2019 07:02 )  Alb: 3.1 g/dL / Pro: 6.3 g/dL / ALK PHOS: 47 U/L / ALT: 6 U/L / AST: 5 U/L / GGT: x             RADIOLOGY & ADDITIONAL TESTS:

## 2019-01-17 NOTE — DIETITIAN INITIAL EVALUATION ADULT. - OTHER INFO
Pt seen for length of stay and consult for initial assessment. Pt reports good appetite and PO intake, consuming 100% of meals. Denies difficulty chewing/swallowing. Pt denies nausea, vomiting, further diarrhea, or constipation, reports last BM yesterday (01/16).

## 2019-01-17 NOTE — DIETITIAN INITIAL EVALUATION ADULT. - NS AS NUTRI INTERV ED CONTENT
Provided thorough education on low fiber diet per pt's concern. Described the benefits of a low fiber diet to help lessen inflammation and ease digestion, described foods high in fiber and foods recommended within therapeutic diet, discussed nutrition label reading using foods in tray as example, reviewed how to adjust diet recall to low fiber diet. Encouraged consumption of carbohydrates in portion size to help prevent high BG while on steroids. Discussed foods considered as irritants and gassy and encouraged pt to keep a diary to assess tolerance. Recommended low fat foods for easier digestion. Provided handout with education provided. Pt made aware RD remains available.

## 2019-01-17 NOTE — PROGRESS NOTE ADULT - SUBJECTIVE AND OBJECTIVE BOX
Follow-up Pulm Progress Note    No new respiratory events overnight.   Reports intermittent coughing with pale yellowish sputum (patient's baseline).  Sats 95% RA.  Denies SOB/CP.     Medications:  MEDICATIONS  (STANDING):  buDESOnide 160 MICROgram(s)/formoterol 4.5 MICROgram(s) Inhaler 2 Puff(s) Inhalation two times a day  famotidine Injectable 20 milliGRAM(s) IV Push every 24 hours  levothyroxine 25 MICROGram(s) Oral daily  mesalamine DR Capsule 800 milliGRAM(s) Oral four times a day  predniSONE   Tablet 40 milliGRAM(s) Oral daily    MEDICATIONS  (PRN):  ALBUTerol/ipratropium for Nebulization 3 milliLiter(s) Nebulizer every 6 hours PRN Shortness of Breath and/or Wheezing        Vital Signs Last 24 Hrs  T(C): 36.9 (17 Jan 2019 12:02), Max: 37.1 (16 Jan 2019 21:22)  T(F): 98.5 (17 Jan 2019 12:02), Max: 98.8 (16 Jan 2019 21:22)  HR: 69 (17 Jan 2019 12:02) (62 - 80)  BP: 111/72 (17 Jan 2019 12:02) (109/64 - 125/81)  BP(mean): --  RR: 18 (17 Jan 2019 12:02) (18 - 18)  SpO2: 94% (17 Jan 2019 12:02) (94% - 95%)          01-16 @ 07:01  -  01-17 @ 07:00  --------------------------------------------------------  IN: 720 mL / OUT: 0 mL / NET: 720 mL          LABS:                        10.3   12.25 )-----------( 523      ( 17 Jan 2019 07:58 )             32.1     01-17    137  |  98  |  12  ----------------------------<  102<H>  3.6   |  29  |  0.62    Ca    8.7      17 Jan 2019 06:49    TPro  6.3  /  Alb  3.1<L>  /  TBili  0.5  /  DBili  x   /  AST  5<L>  /  ALT  6<L>  /  AlkPhos  47  01-16        CULTURES:       Culture - Urine (collected 01-11-19 @ 20:28)  Source: .Urine Clean Catch (Midstream)  Final Report (01-12-19 @ 20:36):    No growth      Physical Examination:  PULM: Rhonchi at bilateral bases that clears with coughing.  CVS: RRR

## 2019-01-17 NOTE — DIETITIAN INITIAL EVALUATION ADULT. - ADHERENCE
Pt reports following a lactose free diet at home, states consuming a "very healthy" diet consisting of vegetables, whole grains, Pt reports following a lactose free, high fiber diet at home, states consuming a "very healthy" diet consisting of vegetables, whole grains, salad, and low fat proteins. Reports taking probiotics, vitamins C and D, and B complex PTA.

## 2019-01-17 NOTE — PROGRESS NOTE ADULT - ATTENDING COMMENTS
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  steroids to continue
time spent coordinating d/c 45 min

## 2019-01-17 NOTE — PROGRESS NOTE ADULT - REASON FOR ADMISSION
UC flare

## 2019-01-17 NOTE — PROGRESS NOTE ADULT - PROBLEM SELECTOR PLAN 1
Management as per GI  -Currently on Prednisone   -mesalamine DR four times a day  -Currently tolerating regular diet

## 2019-01-17 NOTE — PROGRESS NOTE ADULT - ASSESSMENT
In summary, 43 yo female with ulcerative colitis, severe, improving on IV steroids and changed to oral.      REC:    Able to tolerate oral and can be d/c to be fu next week in my office  Continue Prednisone 40 mg daily  Will send script to pharmacy to take Asacol  mg po 2 tabs TID.    Giancarlo Landa MD

## 2019-01-17 NOTE — PHARMACOTHERAPY INTERVENTION NOTE - COMMENTS
Spoke to patient about indication, directions, and side effect profile of medications. [facility] pharmacy called patient regarding medications that required clarification. Informed pharmacist that plan is for patient to receive Asacol 1.6g by mouth three times daily and prednisone 40mg by mouth twice daily and to disregard other prescriptions as discussed with NP.     Hernan Cerda, PharmD  [phone]

## 2019-01-18 LAB — ANA TITR SER: NEGATIVE — SIGNIFICANT CHANGE UP

## 2019-01-18 NOTE — DISCUSSION/SUMMARY
[Home] : patient was discharged to home [FreeTextEntry1] : Spoke to patient briefly regarding recent hospitalization, stated that she is not doing so well, but did let me know she was able to  the prednisone and asacol prescriptions prescribed to her by Dr. Landa. Informed patient an office follow up is recommended, but she stated she did not want to talk to me as "I have not heard from your office this whole time" and hung up.

## 2019-01-25 NOTE — ED PROVIDER NOTE - CROS ED GI ALL NEG
- - -
Ears: no ear pain and no hearing problems.Nose: no nasal congestion and no nasal drainage.Mouth/Throat: no dysphagia, no hoarseness and no throat pain.Neck: no lumps, no pain, no stiffness and no swollen glands.

## 2019-04-28 ENCOUNTER — TRANSCRIPTION ENCOUNTER (OUTPATIENT)
Age: 44
End: 2019-04-28

## 2019-04-30 ENCOUNTER — OUTPATIENT (OUTPATIENT)
Dept: OUTPATIENT SERVICES | Facility: HOSPITAL | Age: 44
LOS: 1 days | End: 2019-04-30
Payer: COMMERCIAL

## 2019-04-30 ENCOUNTER — APPOINTMENT (OUTPATIENT)
Dept: RADIOLOGY | Facility: IMAGING CENTER | Age: 44
End: 2019-04-30
Payer: COMMERCIAL

## 2019-04-30 DIAGNOSIS — Z00.8 ENCOUNTER FOR OTHER GENERAL EXAMINATION: ICD-10-CM

## 2019-04-30 DIAGNOSIS — C43.71 MALIGNANT MELANOMA OF RIGHT LOWER LIMB, INCLUDING HIP: Chronic | ICD-10-CM

## 2019-04-30 DIAGNOSIS — Z98.89 OTHER SPECIFIED POSTPROCEDURAL STATES: Chronic | ICD-10-CM

## 2019-04-30 DIAGNOSIS — Z90.89 ACQUIRED ABSENCE OF OTHER ORGANS: Chronic | ICD-10-CM

## 2019-04-30 PROBLEM — K51.90 ULCERATIVE COLITIS, UNSPECIFIED, WITHOUT COMPLICATIONS: Chronic | Status: ACTIVE | Noted: 2019-01-11

## 2019-04-30 PROCEDURE — 73522 X-RAY EXAM HIPS BI 3-4 VIEWS: CPT | Mod: 26

## 2019-04-30 PROCEDURE — 73522 X-RAY EXAM HIPS BI 3-4 VIEWS: CPT

## 2019-05-28 ENCOUNTER — MEDICATION RENEWAL (OUTPATIENT)
Age: 44
End: 2019-05-28

## 2019-05-29 ENCOUNTER — RX RENEWAL (OUTPATIENT)
Age: 44
End: 2019-05-29

## 2019-07-25 NOTE — PATIENT PROFILE ADULT. - ATTEMPT TO OOB
Detail Level: Detailed Plan: Cryo tested Initiate Treatment: Mupirocin ointment to apply daily. Samples Given: Cerave healing ointment to apply daily. no

## 2019-08-06 NOTE — H&P ADULT - NSHPREVIEWOFSYSTEMS_GEN_ALL_CORE
Will see if insurance will cover jardiance rx Review of Systems:   CONSTITUTIONAL: + fever, chills  EYES: No eye pain, visual disturbances, or discharge  ENMT:  No difficulty hearing, tinnitus, vertigo; No sinus or throat pain  NECK: No pain or stiffness  RESPIRATORY: + productive cough, + shortness of breath  CARDIOVASCULAR: No chest pain, palpitations, dizziness, or leg swelling  GASTROINTESTINAL: + diffuse abdominal discomfort, + nausea. No vomiting, diarrhea, constipation, melena, hematochezia.  GENITOURINARY: No dysuria, frequency, hematuria, or incontinence  NEUROLOGICAL: No headaches, memory loss, loss of strength, numbness, or tremors  SKIN: No itching, burning, rashes, or lesions   ENDOCRINE: No heat or cold intolerance; No hair loss  MUSCULOSKELETAL: No joint pain or swelling; No muscle, back, or extremity pain  PSYCHIATRIC: No depression, anxiety, mood swings, or difficulty sleeping  HEME/LYMPH: No easy bruising, or bleeding gums  ALLERY AND IMMUNOLOGIC: No hives or eczema

## 2019-08-20 ENCOUNTER — MEDICATION RENEWAL (OUTPATIENT)
Age: 44
End: 2019-08-20

## 2019-08-20 ENCOUNTER — RX RENEWAL (OUTPATIENT)
Age: 44
End: 2019-08-20

## 2019-08-30 ENCOUNTER — LABORATORY RESULT (OUTPATIENT)
Age: 44
End: 2019-08-30

## 2019-08-30 ENCOUNTER — APPOINTMENT (OUTPATIENT)
Dept: RHEUMATOLOGY | Facility: CLINIC | Age: 44
End: 2019-08-30
Payer: COMMERCIAL

## 2019-08-30 VITALS
WEIGHT: 160 LBS | TEMPERATURE: 97.9 F | SYSTOLIC BLOOD PRESSURE: 109 MMHG | BODY MASS INDEX: 25.82 KG/M2 | HEART RATE: 68 BPM | OXYGEN SATURATION: 98 % | DIASTOLIC BLOOD PRESSURE: 77 MMHG

## 2019-08-30 PROCEDURE — 99204 OFFICE O/P NEW MOD 45 MIN: CPT

## 2019-08-30 RX ORDER — FLUOCINONIDE 0.5 MG/ML
0.05 SOLUTION TOPICAL
Qty: 60 | Refills: 3 | Status: DISCONTINUED | COMMUNITY
Start: 2017-04-27 | End: 2019-08-30

## 2019-08-30 RX ORDER — CLINDAMYCIN PHOSPHATE 10 MG/ML
1 LOTION TOPICAL TWICE DAILY
Qty: 60 | Refills: 0 | Status: DISCONTINUED | COMMUNITY
Start: 2018-11-02 | End: 2019-08-30

## 2019-09-03 ENCOUNTER — FORM ENCOUNTER (OUTPATIENT)
Age: 44
End: 2019-09-03

## 2019-09-04 ENCOUNTER — APPOINTMENT (OUTPATIENT)
Dept: RADIOLOGY | Facility: CLINIC | Age: 44
End: 2019-09-04
Payer: COMMERCIAL

## 2019-09-04 ENCOUNTER — OUTPATIENT (OUTPATIENT)
Dept: OUTPATIENT SERVICES | Facility: HOSPITAL | Age: 44
LOS: 1 days | End: 2019-09-04
Payer: COMMERCIAL

## 2019-09-04 DIAGNOSIS — Z98.89 OTHER SPECIFIED POSTPROCEDURAL STATES: Chronic | ICD-10-CM

## 2019-09-04 DIAGNOSIS — Z90.89 ACQUIRED ABSENCE OF OTHER ORGANS: Chronic | ICD-10-CM

## 2019-09-04 DIAGNOSIS — E55.9 VITAMIN D DEFICIENCY, UNSPECIFIED: ICD-10-CM

## 2019-09-04 DIAGNOSIS — C43.71 MALIGNANT MELANOMA OF RIGHT LOWER LIMB, INCLUDING HIP: Chronic | ICD-10-CM

## 2019-09-04 PROCEDURE — 73630 X-RAY EXAM OF FOOT: CPT | Mod: 26,LT,76

## 2019-09-04 PROCEDURE — 73610 X-RAY EXAM OF ANKLE: CPT | Mod: 26,RT

## 2019-09-04 PROCEDURE — 73630 X-RAY EXAM OF FOOT: CPT

## 2019-09-04 PROCEDURE — 73130 X-RAY EXAM OF HAND: CPT | Mod: 26,LT,76

## 2019-09-04 PROCEDURE — 73630 X-RAY EXAM OF FOOT: CPT | Mod: 26,RT

## 2019-09-04 PROCEDURE — 73130 X-RAY EXAM OF HAND: CPT

## 2019-09-04 PROCEDURE — 73130 X-RAY EXAM OF HAND: CPT | Mod: 26,RT

## 2019-09-04 PROCEDURE — 73110 X-RAY EXAM OF WRIST: CPT | Mod: 26,RT

## 2019-09-04 PROCEDURE — 73110 X-RAY EXAM OF WRIST: CPT

## 2019-09-04 PROCEDURE — 73110 X-RAY EXAM OF WRIST: CPT | Mod: 26,LT,76

## 2019-09-04 PROCEDURE — 73610 X-RAY EXAM OF ANKLE: CPT

## 2019-09-04 PROCEDURE — 73610 X-RAY EXAM OF ANKLE: CPT | Mod: 26,LT,76

## 2019-09-12 ENCOUNTER — LABORATORY RESULT (OUTPATIENT)
Age: 44
End: 2019-09-12

## 2019-09-12 ENCOUNTER — APPOINTMENT (OUTPATIENT)
Dept: INTERNAL MEDICINE | Facility: CLINIC | Age: 44
End: 2019-09-12
Payer: COMMERCIAL

## 2019-09-12 VITALS
HEART RATE: 100 BPM | WEIGHT: 160 LBS | SYSTOLIC BLOOD PRESSURE: 98 MMHG | DIASTOLIC BLOOD PRESSURE: 58 MMHG | OXYGEN SATURATION: 97 % | HEIGHT: 66 IN | BODY MASS INDEX: 25.71 KG/M2

## 2019-09-12 VITALS — TEMPERATURE: 98.4 F

## 2019-09-12 LAB
BILIRUB UR QL STRIP: NORMAL
CLARITY UR: CLEAR
COLLECTION METHOD: NORMAL
GLUCOSE UR-MCNC: NORMAL
HCG UR QL: 2 EU/DL
HGB UR QL STRIP.AUTO: NORMAL
KETONES UR-MCNC: NORMAL
LEUKOCYTE ESTERASE UR QL STRIP: NORMAL
NITRITE UR QL STRIP: NORMAL
PH UR STRIP: 6.5
PROT UR STRIP-MCNC: NORMAL
SP GR UR STRIP: 1.01

## 2019-09-12 PROCEDURE — 81003 URINALYSIS AUTO W/O SCOPE: CPT | Mod: QW

## 2019-09-12 PROCEDURE — 99214 OFFICE O/P EST MOD 30 MIN: CPT | Mod: 25

## 2019-09-12 PROCEDURE — 36415 COLL VENOUS BLD VENIPUNCTURE: CPT

## 2019-09-12 NOTE — ASSESSMENT
[FreeTextEntry1] : Patient is a 44-year-old female with history of ulcerative colitis, inflammatory arthropathy, inflammatory lung disease, bronchiectasis, hypothyroidism, history of melanoma, who presents for acute onset of increasing fever chills and cough\par \par 1. Acute sinusitis sinus/bronchitis\par most likely viral however given immunosuppression will check CBC sputum culture chest x-ray urine\par observe\par patient to and Atrovent nasal spray African nasal spray\par continue Advair and start albuterol\par if fails to improve consider adding Levaquin\par \par 2. Ulcerative colitis\par continue entyvio\par \par 3 inflammatory lung disease\par underlying inflammation probably secondary to inflammatory bowel or other immune process plus bronchiectasis\par continue Advair Proventil and consider antibiotic if worsens\par \par 4. Arthritis\par most likely inflammatory patient reluctant to start any medications follow-up with whom\par \par 5 hypothyroidism\par continue levothyroxine 25 Joel check TFTs

## 2019-09-12 NOTE — PHYSICAL EXAM
[Normal Sclera/Conjunctiva] : normal sclera/conjunctiva [Normal Oropharynx] : the oropharynx was normal [Normal Outer Ear/Nose] : the outer ears and nose were normal in appearance [No Lymphadenopathy] : no lymphadenopathy [No JVD] : no jugular venous distention [Supple] : supple [No Accessory Muscle Use] : no accessory muscle use [No Respiratory Distress] : no respiratory distress  [Normal Rate] : normal rate  [Clear to Auscultation] : lungs were clear to auscultation bilaterally [Regular Rhythm] : with a regular rhythm [Normal S1, S2] : normal S1 and S2 [Soft] : abdomen soft [Non Tender] : non-tender [No HSM] : no HSM [Non-distended] : non-distended [Normal Bowel Sounds] : normal bowel sounds [de-identified] : Tired acutely ill looking female [de-identified] : Fluid right here

## 2019-09-12 NOTE — REVIEW OF SYSTEMS
[Fever] : fever [Chills] : chills [Fatigue] : fatigue [Nasal Discharge] : nasal discharge [Sore Throat] : sore throat [Postnasal Drip] : postnasal drip [Shortness Of Breath] : shortness of breath [Wheezing] : wheezing [Cough] : cough [Joint Pain] : joint pain [Muscle Pain] : muscle pain [Back Pain] : back pain [Negative] : Integumentary [Hot Flashes] : no hot flashes [Night Sweats] : no night sweats [Earache] : no earache [Hearing Loss] : no hearing loss [Nosebleed] : no nosebleeds [Hoarseness] : no hoarseness [Chest Pain] : no chest pain [Orthopnea] : no orthopnea [Dyspnea on Exertion] : no dyspnea on exertion [Joint Stiffness] : no joint stiffness [Joint Swelling] : no joint swelling [Muscle Weakness] : no muscle weakness

## 2019-09-12 NOTE — HISTORY OF PRESENT ILLNESS
[FreeTextEntry8] : \par \par CC: sudden onset of of fever chills cough nasal congestion\par \par HPI: patient is a 44-year-old female with history of recently diagnosed inflammatory bowel disease with possible secondary related arthritis, inflammatory lung disease, history of melanoma, prediabetes, borderline hypercholesterolemia, bronchiectasis on chest x-ray, who presents with acute onset of fever chills productive cough and nasal congestion associated with shortness of breath she denies nausea vomiting bloodied diarrhea sick contacts

## 2019-09-14 ENCOUNTER — MEDICATION RENEWAL (OUTPATIENT)
Age: 44
End: 2019-09-14

## 2019-09-14 RX ORDER — NEBULIZER ACCESSORIES
KIT MISCELLANEOUS
Qty: 15 | Refills: 2 | Status: ACTIVE | COMMUNITY
Start: 2019-09-14 | End: 1900-01-01

## 2019-09-15 ENCOUNTER — FORM ENCOUNTER (OUTPATIENT)
Age: 44
End: 2019-09-15

## 2019-09-16 ENCOUNTER — OUTPATIENT (OUTPATIENT)
Dept: OUTPATIENT SERVICES | Facility: HOSPITAL | Age: 44
LOS: 1 days | End: 2019-09-16
Payer: COMMERCIAL

## 2019-09-16 ENCOUNTER — APPOINTMENT (OUTPATIENT)
Dept: RADIOLOGY | Facility: CLINIC | Age: 44
End: 2019-09-16
Payer: COMMERCIAL

## 2019-09-16 DIAGNOSIS — Z90.89 ACQUIRED ABSENCE OF OTHER ORGANS: Chronic | ICD-10-CM

## 2019-09-16 DIAGNOSIS — C43.71 MALIGNANT MELANOMA OF RIGHT LOWER LIMB, INCLUDING HIP: Chronic | ICD-10-CM

## 2019-09-16 DIAGNOSIS — Z00.8 ENCOUNTER FOR OTHER GENERAL EXAMINATION: ICD-10-CM

## 2019-09-16 DIAGNOSIS — Z98.89 OTHER SPECIFIED POSTPROCEDURAL STATES: Chronic | ICD-10-CM

## 2019-09-16 LAB
ALBUMIN SERPL ELPH-MCNC: 3.9 G/DL
ALP BLD-CCNC: 100 U/L
ALT SERPL-CCNC: 64 U/L
ANION GAP SERPL CALC-SCNC: 13 MMOL/L
AST SERPL-CCNC: 58 U/L
BASOPHILS # BLD AUTO: 0.06 K/UL
BASOPHILS NFR BLD AUTO: 0.3 %
BILIRUB SERPL-MCNC: 1.7 MG/DL
BUN SERPL-MCNC: 6 MG/DL
CALCIUM SERPL-MCNC: 9.2 MG/DL
CHLORIDE SERPL-SCNC: 98 MMOL/L
CO2 SERPL-SCNC: 24 MMOL/L
CREAT SERPL-MCNC: 0.67 MG/DL
EOSINOPHIL # BLD AUTO: 0.03 K/UL
EOSINOPHIL NFR BLD AUTO: 0.1 %
GLUCOSE SERPL-MCNC: 104 MG/DL
HCT VFR BLD CALC: 45.4 %
HGB BLD-MCNC: 14.4 G/DL
IMM GRANULOCYTES NFR BLD AUTO: 0.6 %
LYMPHOCYTES # BLD AUTO: 2.12 K/UL
LYMPHOCYTES NFR BLD AUTO: 9.1 %
MAN DIFF?: NORMAL
MCHC RBC-ENTMCNC: 28.9 PG
MCHC RBC-ENTMCNC: 31.7 GM/DL
MCV RBC AUTO: 91.2 FL
MONOCYTES # BLD AUTO: 1.78 K/UL
MONOCYTES NFR BLD AUTO: 7.7 %
NEUTROPHILS # BLD AUTO: 19.08 K/UL
NEUTROPHILS NFR BLD AUTO: 82.2 %
PLATELET # BLD AUTO: 327 K/UL
POTASSIUM SERPL-SCNC: 4.4 MMOL/L
PROT SERPL-MCNC: 8 G/DL
RBC # BLD: 4.98 M/UL
RBC # FLD: 12.9 %
SODIUM SERPL-SCNC: 135 MMOL/L
T4 FREE SERPL-MCNC: 1 NG/DL
TSH SERPL-ACNC: 0.59 UIU/ML
WBC # FLD AUTO: 23.22 K/UL

## 2019-09-16 PROCEDURE — 71046 X-RAY EXAM CHEST 2 VIEWS: CPT | Mod: 26

## 2019-09-16 PROCEDURE — 71046 X-RAY EXAM CHEST 2 VIEWS: CPT

## 2019-09-17 LAB
25(OH)D3 SERPL-MCNC: 34 NG/ML
ACE BLD-CCNC: <15 U/L
ALBUMIN MFR SERPL ELPH: 54.6 %
ALBUMIN SERPL ELPH-MCNC: 4.5 G/DL
ALBUMIN SERPL-MCNC: 4.5 G/DL
ALBUMIN/GLOB SERPL: 1.2 RATIO
ALP BLD-CCNC: 64 U/L
ALPHA1 GLOB MFR SERPL ELPH: 3.4 %
ALPHA1 GLOB SERPL ELPH-MCNC: 0.3 G/DL
ALPHA2 GLOB MFR SERPL ELPH: 7.7 %
ALPHA2 GLOB SERPL ELPH-MCNC: 0.6 G/DL
ALT SERPL-CCNC: 14 U/L
ANA PAT FLD IF-IMP: ABNORMAL
ANA SER IF-ACNC: ABNORMAL
ANION GAP SERPL CALC-SCNC: 14 MMOL/L
APPEARANCE: CLEAR
AST SERPL-CCNC: 20 U/L
B BURGDOR IGG+IGM SER QL IB: NORMAL
B-GLOBULIN MFR SERPL ELPH: 9.2 %
B-GLOBULIN SERPL ELPH-MCNC: 0.8 G/DL
B2 GLYCOPROT1 AB SER QL: POSITIVE
B2 GLYCOPROT1 IGA SERPL IA-ACNC: <5 SAU
BACTERIA: NEGATIVE
BASOPHILS # BLD AUTO: 0.05 K/UL
BASOPHILS NFR BLD AUTO: 0.6 %
BILIRUB SERPL-MCNC: 0.9 MG/DL
BILIRUBIN URINE: NEGATIVE
BLOOD URINE: ABNORMAL
BUN SERPL-MCNC: 8 MG/DL
C3 SERPL-MCNC: 110 MG/DL
C4 SERPL-MCNC: 21 MG/DL
CALCIUM SERPL-MCNC: 9.9 MG/DL
CARDIOLIPIN AB SER IA-ACNC: POSITIVE
CCP AB SER IA-ACNC: 87 UNITS
CHLORIDE SERPL-SCNC: 100 MMOL/L
CK SERPL-CCNC: 89 U/L
CO2 SERPL-SCNC: 26 MMOL/L
COLOR: NORMAL
CONFIRM: 26 SEC
CREAT SERPL-MCNC: 0.75 MG/DL
CREAT SPEC-SCNC: 43 MG/DL
CREAT/PROT UR: 0.1 RATIO
CRP SERPL-MCNC: <0.1 MG/DL
DEPRECATED KAPPA LC FREE/LAMBDA SER: 1.43 RATIO
DRVVT IMM 1:2 NP PPP: NORMAL
DRVVT SCREEN TO CONFIRM RATIO: 0.96 RATIO
DSDNA AB SER-ACNC: 38 IU/ML
ENA RNP AB SER IA-ACNC: <0.2 AL
ENA SM AB SER IA-ACNC: <0.2 AL
ENA SS-A AB SER IA-ACNC: <0.2 AL
ENA SS-B AB SER IA-ACNC: <0.2 AL
EOSINOPHIL # BLD AUTO: 0.16 K/UL
EOSINOPHIL NFR BLD AUTO: 1.9 %
ERYTHROCYTE [SEDIMENTATION RATE] IN BLOOD BY WESTERGREN METHOD: 6 MM/HR
GAMMA GLOB FLD ELPH-MCNC: 2.1 G/DL
GAMMA GLOB MFR SERPL ELPH: 25.1 %
GLUCOSE QUALITATIVE U: NEGATIVE
GLUCOSE SERPL-MCNC: 84 MG/DL
HCT VFR BLD CALC: 43.3 %
HGB BLD-MCNC: 14.2 G/DL
HLA-B27 RELATED AG QL: NORMAL
HYALINE CASTS: 1 /LPF
IGA SER QL IEP: 210 MG/DL
IGA SER QL IEP: 210 MG/DL
IGG SER QL IEP: 2092 MG/DL
IGG SUBSET TOTAL IGG: 732 MG/DL
IGG1 SER-MCNC: 419 MG/DL
IGG2 SER-MCNC: 192 MG/DL
IGG3 SER-MCNC: 22 MG/DL
IGG4 SER-MCNC: 4 MG/DL
IGM SER QL IEP: 239 MG/DL
IGM SER QL IEP: 239 MG/DL
IMM GRANULOCYTES NFR BLD AUTO: 0.2 %
INTERPRETATION SERPL IEP-IMP: NORMAL
KAPPA LC CSF-MCNC: 1.91 MG/DL
KAPPA LC SERPL-MCNC: 2.74 MG/DL
KETONES URINE: NEGATIVE
LEUKOCYTE ESTERASE URINE: NEGATIVE
LYMPHOCYTES # BLD AUTO: 2.65 K/UL
LYMPHOCYTES NFR BLD AUTO: 31.1 %
M PROTEIN SPEC IFE-MCNC: NORMAL
MAN DIFF?: NORMAL
MCHC RBC-ENTMCNC: 28.6 PG
MCHC RBC-ENTMCNC: 32.8 GM/DL
MCV RBC AUTO: 87.1 FL
MICROSCOPIC-UA: NORMAL
MONOCYTES # BLD AUTO: 0.65 K/UL
MONOCYTES NFR BLD AUTO: 7.6 %
MPO AB + PR3 PNL SER: NORMAL
NEUTROPHILS # BLD AUTO: 4.98 K/UL
NEUTROPHILS NFR BLD AUTO: 58.6 %
NITRITE URINE: NEGATIVE
PH URINE: 7
PLATELET # BLD AUTO: 341 K/UL
POTASSIUM SERPL-SCNC: 4.4 MMOL/L
PROT SERPL-MCNC: 8.2 G/DL
PROT UR-MCNC: 4 MG/DL
PROTEIN URINE: NEGATIVE
RBC # BLD: 4.97 M/UL
RBC # FLD: 12.3 %
RED BLOOD CELLS URINE: 4 /HPF
RF+CCP IGG SER-IMP: ABNORMAL
RHEUMATOID FACT SER QL: 50 IU/ML
RIBOSOMAL P AB SER IA-ACNC: <0.2 AL
SCREEN DRVVT: 30 SEC
SILICA CLOTTING TIME INTERPRETATION: NORMAL
SILICA CLOTTING TIME S/C: 1.16 RATIO
SODIUM SERPL-SCNC: 140 MMOL/L
SPECIFIC GRAVITY URINE: 1.01
SQUAMOUS EPITHELIAL CELLS: 1 /HPF
THYROGLOB AB SERPL-ACNC: <20 IU/ML
THYROPEROXIDASE AB SERPL IA-ACNC: 20.9 IU/ML
TOTAL IGE SMQN RAST: 121 KU/L
UROBILINOGEN URINE: NORMAL
WBC # FLD AUTO: 8.51 K/UL
WHITE BLOOD CELLS URINE: 0 /HPF

## 2019-10-01 LAB
BACTERIA SPT CULT: ABNORMAL
RESPIRATORY PATH PNL SPEC CULT: NORMAL

## 2019-11-21 ENCOUNTER — APPOINTMENT (OUTPATIENT)
Dept: INTERNAL MEDICINE | Facility: CLINIC | Age: 44
End: 2019-11-21
Payer: COMMERCIAL

## 2019-11-21 VITALS
SYSTOLIC BLOOD PRESSURE: 110 MMHG | HEART RATE: 85 BPM | HEIGHT: 66 IN | OXYGEN SATURATION: 98 % | WEIGHT: 160 LBS | BODY MASS INDEX: 25.71 KG/M2 | DIASTOLIC BLOOD PRESSURE: 60 MMHG

## 2019-11-21 DIAGNOSIS — Z87.09 PERSONAL HISTORY OF OTHER DISEASES OF THE RESPIRATORY SYSTEM: ICD-10-CM

## 2019-11-21 LAB
BILIRUB UR QL STRIP: NORMAL
CLARITY UR: CLEAR
COLLECTION METHOD: NORMAL
GLUCOSE UR-MCNC: NORMAL
HCG UR QL: 0.2 EU/DL
HGB UR QL STRIP.AUTO: NORMAL
KETONES UR-MCNC: NORMAL
LEUKOCYTE ESTERASE UR QL STRIP: NORMAL
NITRITE UR QL STRIP: NORMAL
PH UR STRIP: 7
PROT UR STRIP-MCNC: NORMAL
SP GR UR STRIP: 1.01

## 2019-11-21 PROCEDURE — 99214 OFFICE O/P EST MOD 30 MIN: CPT | Mod: 25

## 2019-11-21 PROCEDURE — 81003 URINALYSIS AUTO W/O SCOPE: CPT | Mod: QW

## 2019-11-25 LAB
ANION GAP SERPL CALC-SCNC: 12 MMOL/L
BASOPHILS # BLD AUTO: 0.07 K/UL
BASOPHILS NFR BLD AUTO: 0.5 %
BUN SERPL-MCNC: 5 MG/DL
CALCIUM SERPL-MCNC: 9.7 MG/DL
CHLORIDE SERPL-SCNC: 100 MMOL/L
CO2 SERPL-SCNC: 27 MMOL/L
CREAT SERPL-MCNC: 0.7 MG/DL
EOSINOPHIL # BLD AUTO: 0.16 K/UL
EOSINOPHIL NFR BLD AUTO: 1.1 %
GLUCOSE SERPL-MCNC: 99 MG/DL
HCT VFR BLD CALC: 41.1 %
HCV AB SER QL: NONREACTIVE
HCV S/CO RATIO: 0.31 S/CO
HGB BLD-MCNC: 13.5 G/DL
HIV1+2 AB SPEC QL IA.RAPID: NONREACTIVE
IMM GRANULOCYTES NFR BLD AUTO: 0.3 %
LYMPHOCYTES # BLD AUTO: 2.6 K/UL
LYMPHOCYTES NFR BLD AUTO: 17.3 %
MAN DIFF?: NORMAL
MCHC RBC-ENTMCNC: 29 PG
MCHC RBC-ENTMCNC: 32.8 GM/DL
MCV RBC AUTO: 88.4 FL
MONOCYTES # BLD AUTO: 0.98 K/UL
MONOCYTES NFR BLD AUTO: 6.5 %
NEUTROPHILS # BLD AUTO: 11.19 K/UL
NEUTROPHILS NFR BLD AUTO: 74.3 %
PLATELET # BLD AUTO: 360 K/UL
POTASSIUM SERPL-SCNC: 4.3 MMOL/L
RBC # BLD: 4.65 M/UL
RBC # FLD: 12.8 %
SODIUM SERPL-SCNC: 139 MMOL/L
WBC # FLD AUTO: 15.04 K/UL

## 2019-11-25 NOTE — HISTORY OF PRESENT ILLNESS
[FreeTextEntry8] : CC cough and right flank pain for one week\par \par HPI; patient is a 44-year-old female with history of bronchiectasis, colitis/ulcerative colitis, bronchospasm, hypothyroidism, malignant melanoma, prediabetes, and allergic rhinitis who presents with one-week history of increasing cough feverish and chills with associated with shortness of breath denies chest pain, nausea vomiting, diarrhea. Patient also complains of bilateral hand joint pain and right flank pain denies dysuria, discharge. Also positive sick contacts at home son.

## 2019-11-25 NOTE — REVIEW OF SYSTEMS
[Fever] : fever [Fatigue] : fatigue [Chills] : chills [Recent Change In Weight] : ~T no recent weight change [Hot Flashes] : no hot flashes [Night Sweats] : night sweats [Nosebleed] : no nosebleeds [Earache] : no earache [Hearing Loss] : no hearing loss [Nasal Discharge] : nasal discharge [Hoarseness] : no hoarseness [Sore Throat] : no sore throat [Postnasal Drip] : no postnasal drip [Shortness Of Breath] : shortness of breath [Wheezing] : no wheezing [Cough] : cough [Dyspnea on Exertion] : no dyspnea on exertion [Negative] : Genitourinary

## 2019-11-25 NOTE — ASSESSMENT
[FreeTextEntry1] : Patient is a 44-year-old female with history of bronchospasm/bronchiectasis, colitis/ulcerative colitis entyvio, history of melanoma, hypothyroidism who presents for increasing cough and right flank pain\par \par 1 cough\par will check white count most likely viral bronchitis\par continue aggressive inhalers physical therapy\par observe spikes fever will start antibiotic\par \par 2) pain\par UA negative\par musculoskeletal\par observe\par \par 3 arthritis\par secondary to possible inflammatory bowel disease\par trial of Voltaren gel avoid nonsteroidal's\par \par 4. Ulcerative colitis\par Entyvio as per G.I.

## 2019-11-25 NOTE — PHYSICAL EXAM
[Well Nourished] : well nourished [Normal Sclera/Conjunctiva] : normal sclera/conjunctiva [Well Developed] : well developed [Normal Outer Ear/Nose] : the outer ears and nose were normal in appearance [Normal Oropharynx] : the oropharynx was normal [No Lymphadenopathy] : no lymphadenopathy [No JVD] : no jugular venous distention [Normal TMs] : both tympanic membranes were normal [Supple] : supple [No Accessory Muscle Use] : no accessory muscle use [No Respiratory Distress] : no respiratory distress  [Regular Rhythm] : with a regular rhythm [Normal Rate] : normal rate  [Normal S1, S2] : normal S1 and S2 [Soft] : abdomen soft [Non Tender] : non-tender [No HSM] : no HSM [Non-distended] : non-distended [Normal Bowel Sounds] : normal bowel sounds [Normal Supraclavicular Nodes] : no supraclavicular lymphadenopathy [Normal Posterior Cervical Nodes] : no posterior cervical lymphadenopathy [No CVA Tenderness] : no CVA  tenderness [No Spinal Tenderness] : no spinal tenderness [Normal Anterior Cervical Nodes] : no anterior cervical lymphadenopathy [de-identified] : Knows mild erythematous [de-identified] : Mild distress coughing [de-identified] : Bronchial breath sounds no wheezing no rales

## 2019-11-27 ENCOUNTER — RX RENEWAL (OUTPATIENT)
Age: 44
End: 2019-11-27

## 2019-12-05 ENCOUNTER — APPOINTMENT (OUTPATIENT)
Dept: INTERNAL MEDICINE | Facility: CLINIC | Age: 44
End: 2019-12-05
Payer: COMMERCIAL

## 2019-12-05 VITALS
DIASTOLIC BLOOD PRESSURE: 70 MMHG | OXYGEN SATURATION: 98 % | TEMPERATURE: 98.3 F | HEIGHT: 66 IN | SYSTOLIC BLOOD PRESSURE: 118 MMHG | HEART RATE: 82 BPM

## 2019-12-05 LAB
BILIRUB UR QL STRIP: NORMAL
CLARITY UR: CLEAR
COLLECTION METHOD: NORMAL
GLUCOSE UR-MCNC: NORMAL
HCG UR QL: 0.2 EU/DL
HGB UR QL STRIP.AUTO: NORMAL
KETONES UR-MCNC: NORMAL
LEUKOCYTE ESTERASE UR QL STRIP: NORMAL
NITRITE UR QL STRIP: NORMAL
PH UR STRIP: 5.5
PROT UR STRIP-MCNC: NORMAL
SP GR UR STRIP: 1.02

## 2019-12-05 PROCEDURE — 81003 URINALYSIS AUTO W/O SCOPE: CPT | Mod: QW

## 2019-12-05 PROCEDURE — 99214 OFFICE O/P EST MOD 30 MIN: CPT | Mod: 25

## 2019-12-05 NOTE — HISTORY OF PRESENT ILLNESS
[FreeTextEntry8] : \par \par CC; right flank pain for the past few days\par \par HPI: patient is a 44-year-old female with history of malignant melanoma the skin, ulcerative colitis on Intivio, bronchiectasis, chronic bronchospasm, prediabetes, high cholesterol, chronic cough who presents for several day history of severe right flank pain denies fever, chills chronic cough, no dysuria, pain worse with twisting no trauma

## 2019-12-05 NOTE — ASSESSMENT
[FreeTextEntry1] : Patient is a 44-year-old female with history of hypothyroidism, melanoma, ulcerative colitis, reactive arthritis, chronic cough, bronchiectasis/bronchospasm, high cholesterol, prediabetes who presents for complaint of several days right flank pain\par \par \par 1 right flank pain\par UA dipstick negative\par most likely musculoskeletal\par trial of Salonpas patch to area, Voltaren gel and muscle relaxer Flexeril 10 one of sedation\par if fails to improve will check x-ray\par \par 2 ulcerative colitis\par one episode of diarrhea yesterday no bowel movement today may be one episode of gastroenteritis versus active colitis\par \par 3 reactive arthropathy\par continue Voltaren gel

## 2019-12-05 NOTE — PHYSICAL EXAM
[No CVA Tenderness] : no CVA  tenderness [No Spinal Tenderness] : no spinal tenderness [Normal] : no CVA or spinal tenderness [No Rash] : no rash [de-identified] : Right-sided tenth rib  tenderness posterior

## 2019-12-11 ENCOUNTER — APPOINTMENT (OUTPATIENT)
Dept: INTERNAL MEDICINE | Facility: CLINIC | Age: 44
End: 2019-12-11

## 2019-12-16 ENCOUNTER — RX RENEWAL (OUTPATIENT)
Age: 44
End: 2019-12-16

## 2019-12-16 ENCOUNTER — APPOINTMENT (OUTPATIENT)
Dept: INTERNAL MEDICINE | Facility: CLINIC | Age: 44
End: 2019-12-16

## 2020-01-09 ENCOUNTER — APPOINTMENT (OUTPATIENT)
Dept: INTERNAL MEDICINE | Facility: CLINIC | Age: 45
End: 2020-01-09
Payer: COMMERCIAL

## 2020-01-09 VITALS
OXYGEN SATURATION: 97 % | WEIGHT: 160 LBS | BODY MASS INDEX: 25.71 KG/M2 | DIASTOLIC BLOOD PRESSURE: 70 MMHG | SYSTOLIC BLOOD PRESSURE: 111 MMHG | TEMPERATURE: 98.6 F | HEIGHT: 66 IN | HEART RATE: 94 BPM

## 2020-01-09 DIAGNOSIS — J01.90 ACUTE SINUSITIS, UNSPECIFIED: ICD-10-CM

## 2020-01-09 LAB
FLUAV SPEC QL CULT: NEGATIVE
FLUBV AG SPEC QL IA: NEGATIVE

## 2020-01-09 PROCEDURE — 87804 INFLUENZA ASSAY W/OPTIC: CPT | Mod: QW

## 2020-01-09 PROCEDURE — 99213 OFFICE O/P EST LOW 20 MIN: CPT | Mod: 25

## 2020-01-09 RX ORDER — PREDNISONE 20 MG/1
20 TABLET ORAL DAILY
Qty: 6 | Refills: 0 | Status: DISCONTINUED | COMMUNITY
Start: 2018-09-23 | End: 2020-01-09

## 2020-01-09 RX ORDER — LEVOFLOXACIN 750 MG/1
750 TABLET, FILM COATED ORAL DAILY
Qty: 5 | Refills: 0 | Status: DISCONTINUED | COMMUNITY
Start: 2019-09-16 | End: 2020-01-09

## 2020-01-09 NOTE — REVIEW OF SYSTEMS
[Fatigue] : fatigue [Nasal Discharge] : nasal discharge [Cough] : cough [Dyspnea on Exertion] : dyspnea on exertion [Back Pain] : back pain [Negative] : Gastrointestinal [Fever] : no fever [Hot Flashes] : no hot flashes [Chills] : no chills [Earache] : no earache [Recent Change In Weight] : ~T no recent weight change [Hearing Loss] : no hearing loss [Sore Throat] : no sore throat [Nosebleed] : no nosebleeds [Hoarseness] : no hoarseness [Shortness Of Breath] : no shortness of breath [Postnasal Drip] : no postnasal drip [Joint Pain] : no joint pain [Wheezing] : no wheezing [Joint Stiffness] : no joint stiffness [Joint Swelling] : no joint swelling [Muscle Weakness] : no muscle weakness [Muscle Pain] : no muscle pain

## 2020-01-09 NOTE — ASSESSMENT
[FreeTextEntry1] : Patient is a 45-year-old female with history of melanoma, bronchiectasis/reactive lung disease, ulcerative colitis, thyroid nodule, prediabetes, hypothyroidism, vitamin D deficiency who presents for acute complaint of fatigue/increasing coughing some congestion, back pain and fatigue\par \par 1 acute viral URI\par May use Atrovent nasal spray\par after nasal spray and saline\par rest and fluids\par rapid flu negative check viral titers\par continue approach opening via nebulizer\par \par 2 back pain\par most likely musculoskeletal May use Flexeril 10 mg TID\par \par \par 3 fatigue\par most likely fatigue secondary caring for elderly mother\par observe reassured patient

## 2020-01-09 NOTE — HISTORY OF PRESENT ILLNESS
[FreeTextEntry8] : \par \par cC: cough times several days weakness and back pain\par \par HPI: patient is a 45-year-old female with history of melanoma, colitis on Vibrizia, bronchiectasis/reactive lung disease, hypothyroidism, prediabetes, seasonal allergies, thyroid nodule, vitamin D insufficiency who presents for several day history of feeling weak, cough, flank pain she denies fever, chills, shortness of breath. Patient taking care of mother who has chronic lower back pain she feels frustrated and overwhelmed.

## 2020-01-09 NOTE — PHYSICAL EXAM
[Normal] : no acute distress, well nourished, well developed and well-appearing [Normal Outer Ear/Nose] : the outer ears and nose were normal in appearance [Normal Oropharynx] : the oropharynx was normal [Supple] : supple [No Lymphadenopathy] : no lymphadenopathy [No JVD] : no jugular venous distention [No Respiratory Distress] : no respiratory distress  [No Accessory Muscle Use] : no accessory muscle use [Clear to Auscultation] : lungs were clear to auscultation bilaterally [Normal Rate] : normal rate  [Regular Rhythm] : with a regular rhythm [Normal S1, S2] : normal S1 and S2 [Soft] : abdomen soft [Non Tender] : non-tender [No HSM] : no HSM [Normal Bowel Sounds] : normal bowel sounds [Normal Supraclavicular Nodes] : no supraclavicular lymphadenopathy [Normal Posterior Cervical Nodes] : no posterior cervical lymphadenopathy [Normal Anterior Cervical Nodes] : no anterior cervical lymphadenopathy [No CVA Tenderness] : no CVA  tenderness [No Spinal Tenderness] : no spinal tenderness [de-identified] : Mild nasal congestion [de-identified] : Lungs clear to auscultation percussion no wheezing, rhonchi rales

## 2020-02-26 NOTE — PATIENT PROFILE ADULT. - PATIENT REPRESENTATIVE PHONE
Patient aware that the provider is out of the office and is okay waiting until they return for the message to be addressed.     Thank you     Patient called stating the medication buspirone is not helping her and would like to know if Dr. Silverman has other options she can try.     Please call back to discuss.  Thank you   [phone]

## 2020-03-21 ENCOUNTER — RX RENEWAL (OUTPATIENT)
Age: 45
End: 2020-03-21

## 2020-04-07 NOTE — ED ADULT NURSE NOTE - ED STAT RN HANDOFF WHERE
Patient denies ctx, loss of fluid or vag bleeding +FM   Obesity- 8 lb weight gain   Abn one-hour gtt - check growth next visit   RLTCS/BTL - 6/9/20   PTL warnings  2 weeks   Anemia- Iron BID    Gold

## 2020-04-16 ENCOUNTER — RX RENEWAL (OUTPATIENT)
Age: 45
End: 2020-04-16

## 2020-05-01 ENCOUNTER — RX RENEWAL (OUTPATIENT)
Age: 45
End: 2020-05-01

## 2020-06-23 ENCOUNTER — RX RENEWAL (OUTPATIENT)
Age: 45
End: 2020-06-23

## 2020-07-14 ENCOUNTER — RX RENEWAL (OUTPATIENT)
Age: 45
End: 2020-07-14

## 2020-07-28 ENCOUNTER — RX RENEWAL (OUTPATIENT)
Age: 45
End: 2020-07-28

## 2020-09-01 ENCOUNTER — RX RENEWAL (OUTPATIENT)
Age: 45
End: 2020-09-01

## 2020-10-27 ENCOUNTER — RX RENEWAL (OUTPATIENT)
Age: 45
End: 2020-10-27

## 2020-12-21 PROBLEM — Z87.09 HISTORY OF ACUTE BRONCHITIS: Status: RESOLVED | Noted: 2018-09-25 | Resolved: 2020-12-21

## 2020-12-23 PROBLEM — J01.90 ACUTE RHINOSINUSITIS: Status: RESOLVED | Noted: 2018-02-07 | Resolved: 2020-12-23

## 2021-01-13 ENCOUNTER — RX RENEWAL (OUTPATIENT)
Age: 46
End: 2021-01-13

## 2021-07-26 ENCOUNTER — RX RENEWAL (OUTPATIENT)
Age: 46
End: 2021-07-26

## 2021-07-27 ENCOUNTER — RX RENEWAL (OUTPATIENT)
Age: 46
End: 2021-07-27

## 2021-07-28 ENCOUNTER — RX RENEWAL (OUTPATIENT)
Age: 46
End: 2021-07-28

## 2021-08-16 ENCOUNTER — TRANSCRIPTION ENCOUNTER (OUTPATIENT)
Age: 46
End: 2021-08-16

## 2021-09-24 ENCOUNTER — RX RENEWAL (OUTPATIENT)
Age: 46
End: 2021-09-24

## 2021-10-19 ENCOUNTER — APPOINTMENT (OUTPATIENT)
Dept: INTERNAL MEDICINE | Facility: CLINIC | Age: 46
End: 2021-10-19
Payer: COMMERCIAL

## 2021-10-19 VITALS
HEIGHT: 66 IN | WEIGHT: 151 LBS | SYSTOLIC BLOOD PRESSURE: 108 MMHG | OXYGEN SATURATION: 95 % | HEART RATE: 85 BPM | TEMPERATURE: 98.5 F | DIASTOLIC BLOOD PRESSURE: 71 MMHG | BODY MASS INDEX: 24.27 KG/M2

## 2021-10-19 DIAGNOSIS — R21 RASH AND OTHER NONSPECIFIC SKIN ERUPTION: ICD-10-CM

## 2021-10-19 DIAGNOSIS — Z87.898 PERSONAL HISTORY OF OTHER SPECIFIED CONDITIONS: ICD-10-CM

## 2021-10-19 DIAGNOSIS — R50.9 FEVER, UNSPECIFIED: ICD-10-CM

## 2021-10-19 DIAGNOSIS — Z87.01 PERSONAL HISTORY OF PNEUMONIA (RECURRENT): ICD-10-CM

## 2021-10-19 DIAGNOSIS — G56.22 LESION OF ULNAR NERVE, LEFT UPPER LIMB: ICD-10-CM

## 2021-10-19 DIAGNOSIS — Z87.2 PERSONAL HISTORY OF DISEASES OF THE SKIN AND SUBCUTANEOUS TISSUE: ICD-10-CM

## 2021-10-19 DIAGNOSIS — G56.21 LESION OF ULNAR NERVE, RIGHT UPPER LIMB: ICD-10-CM

## 2021-10-19 DIAGNOSIS — L02.213 CUTANEOUS ABSCESS OF CHEST WALL: ICD-10-CM

## 2021-10-19 DIAGNOSIS — J98.01 ACUTE BRONCHOSPASM: ICD-10-CM

## 2021-10-19 DIAGNOSIS — R10.31 RIGHT LOWER QUADRANT PAIN: ICD-10-CM

## 2021-10-19 DIAGNOSIS — R40.0 SOMNOLENCE: ICD-10-CM

## 2021-10-19 DIAGNOSIS — Z86.39 PERSONAL HISTORY OF OTHER ENDOCRINE, NUTRITIONAL AND METABOLIC DISEASE: ICD-10-CM

## 2021-10-19 DIAGNOSIS — N39.0 URINARY TRACT INFECTION, SITE NOT SPECIFIED: ICD-10-CM

## 2021-10-19 DIAGNOSIS — L21.9 SEBORRHEIC DERMATITIS, UNSPECIFIED: ICD-10-CM

## 2021-10-19 PROCEDURE — 36415 COLL VENOUS BLD VENIPUNCTURE: CPT

## 2021-10-19 PROCEDURE — 99396 PREV VISIT EST AGE 40-64: CPT | Mod: 25

## 2021-10-19 NOTE — REVIEW OF SYSTEMS
[Shortness Of Breath] : shortness of breath [Wheezing] : wheezing [Cough] : cough [Dyspnea on Exertion] : dyspnea on exertion [Abdominal Pain] : no abdominal pain [Nausea] : no nausea [Constipation] : no constipation [Diarrhea] : diarrhea [Vomiting] : no vomiting [Heartburn] : no heartburn [Melena] : no melena [Negative] : Psychiatric

## 2021-10-19 NOTE — HISTORY OF PRESENT ILLNESS
[FreeTextEntry1] : Comprehensive annual physical examination [de-identified] : Patient is a 46-year-old female with bronchiectasis/reactive airway disease, ulcerative colitis on intivio infusions every four weeks, melanoma, hypothyroidism, hypercholesterolemia, dense breasts, prediabetes seasonal allergies and vitamin D insufficiency who presents for follow-up patient feels well notes chronic cough shortness of breath has been hospitalized several months recently on antibiotics patient states still has occasional diarrhea when eating denies melena bright red blood chest pain shortness of breath palpitations feels well but still complains of joint pain especially of the hands

## 2021-10-19 NOTE — ASSESSMENT
[FreeTextEntry1] : Patient is a 46-year-old female with history of bronchiectasis/reactive lung disease, ulcerative colitis, melanoma, hypothyroidism, prediabetes, dense breasts, ulcerative colitis, vitamin D insufficiency, seasonal allergies, prediabetes, who presents for comprehensive annual physical examination complains of diffuse hand pain joint pain\par \par 1. Multiple joint pain\par May be secondary to ulcerative colitis versus medication\par refer to rheumatology check said rate C-reactive protein\par \par 2. Ulcerative colitis\par continue intivio infusion every four weeks\par follow-up with [facility]par check seat CBC said rate LFTs\par \par 3 hypothyroidism\par continue levothyroxine 25 µg\par check TFTs\par \par 4. History of melanoma\par follow-up with dermatology one month ago\par \par 5 bronchiectasis/reactive lung disease\par continue Symbicort albuterol as necessary saline inhaler\par follow-up with pulmonary\par \par 6. Prediabetes\par check hemoglobin A1c counseled on diet\par \par 7 vitamin D insufficiency\par continue vitamin D supplement\par check level\par \par 8 health maintenance\par patient reluctant to take COVID vaccine had a more discussion\par patient refused flu shot at present time\par check routine labs follow-up in three months\par mammogram dense breasts sonogram.

## 2021-10-19 NOTE — PHYSICAL EXAM
[Normal Sclera/Conjunctiva] : normal sclera/conjunctiva [Normal Outer Ear/Nose] : the outer ears and nose were normal in appearance [No Respiratory Distress] : no respiratory distress  [No Accessory Muscle Use] : no accessory muscle use [Declined Breast Exam] : declined breast exam  [Normal] : affect was normal and insight and judgment were intact [de-identified] : Bilateral course rhonchi

## 2021-10-27 LAB
25(OH)D3 SERPL-MCNC: 42 NG/ML
ALBUMIN SERPL ELPH-MCNC: 4.5 G/DL
ALP BLD-CCNC: 72 U/L
ALT SERPL-CCNC: 9 U/L
ANION GAP SERPL CALC-SCNC: 12 MMOL/L
AST SERPL-CCNC: 19 U/L
BASOPHILS # BLD AUTO: 0.1 K/UL
BASOPHILS NFR BLD AUTO: 1.1 %
BILIRUB SERPL-MCNC: 1.1 MG/DL
BUN SERPL-MCNC: 6 MG/DL
CALCIUM SERPL-MCNC: 9.8 MG/DL
CHLORIDE SERPL-SCNC: 101 MMOL/L
CHOLEST SERPL-MCNC: 205 MG/DL
CO2 SERPL-SCNC: 26 MMOL/L
COVID-19 SPIKE DOMAIN ANTIBODY INTERPRETATION: NEGATIVE
CREAT SERPL-MCNC: 0.69 MG/DL
CRP SERPL-MCNC: <3 MG/L
EOSINOPHIL # BLD AUTO: 0.43 K/UL
EOSINOPHIL NFR BLD AUTO: 4.5 %
ERYTHROCYTE [SEDIMENTATION RATE] IN BLOOD BY WESTERGREN METHOD: 22 MM/HR
GLUCOSE SERPL-MCNC: 105 MG/DL
HCT VFR BLD CALC: 43 %
HDLC SERPL-MCNC: 39 MG/DL
HGB BLD-MCNC: 14.3 G/DL
IMM GRANULOCYTES NFR BLD AUTO: 0.2 %
LDLC SERPL CALC-MCNC: 141 MG/DL
LYMPHOCYTES # BLD AUTO: 2.36 K/UL
LYMPHOCYTES NFR BLD AUTO: 24.8 %
MAN DIFF?: NORMAL
MCHC RBC-ENTMCNC: 28.8 PG
MCHC RBC-ENTMCNC: 33.3 GM/DL
MCV RBC AUTO: 86.7 FL
MONOCYTES # BLD AUTO: 0.59 K/UL
MONOCYTES NFR BLD AUTO: 6.2 %
NEUTROPHILS # BLD AUTO: 6.01 K/UL
NEUTROPHILS NFR BLD AUTO: 63.2 %
NONHDLC SERPL-MCNC: 166 MG/DL
PLATELET # BLD AUTO: 422 K/UL
POTASSIUM SERPL-SCNC: 4.8 MMOL/L
PROT SERPL-MCNC: 8.8 G/DL
RBC # BLD: 4.96 M/UL
RBC # FLD: 12.7 %
SARS-COV-2 AB SERPL IA-ACNC: 0.4 U/ML
SODIUM SERPL-SCNC: 139 MMOL/L
T4 FREE SERPL-MCNC: 1.3 NG/DL
TRIGL SERPL-MCNC: 124 MG/DL
TSH SERPL-ACNC: 0.86 UIU/ML
WBC # FLD AUTO: 9.51 K/UL

## 2022-04-08 ENCOUNTER — RX RENEWAL (OUTPATIENT)
Age: 47
End: 2022-04-08

## 2022-04-19 ENCOUNTER — TRANSCRIPTION ENCOUNTER (OUTPATIENT)
Age: 47
End: 2022-04-19

## 2022-07-07 ENCOUNTER — RX RENEWAL (OUTPATIENT)
Age: 47
End: 2022-07-07

## 2022-08-16 PROBLEM — Z80.1 FAMILY HISTORY OF LUNG CANCER: Status: ACTIVE | Noted: 2022-08-16

## 2022-08-16 PROBLEM — Z80.8 FAMILY HISTORY OF MALIGNANT NEOPLASM OF BRAIN: Status: ACTIVE | Noted: 2022-08-16

## 2022-08-16 PROBLEM — D89.0 POLYCLONAL GAMMOPATHY: Status: ACTIVE | Noted: 2022-08-16

## 2022-08-16 PROBLEM — Z78.9 GRAVIDA 1 PARA 1: Status: RESOLVED | Noted: 2022-08-16 | Resolved: 2022-08-16

## 2022-08-25 NOTE — ED PROVIDER NOTE - PHYSICAL EXAMINATION
-See chronic hypoxic respiratory failure   Gen: NAD  Eyes:  sclerae white, no icterus  ENT: Moist mucous membranes. No exudates  Neck: supple, no LAD, mass or goiter, trachea midline  CV: Tachy. Audible S1 and S2. No murmurs, rubs, gallops, S3, nor S4  Pulm: hypoxic to 92% on RA, Bibasilar rales, more prominent on right  Abd: BS+, nondistended, No tenderness to palpation  Musculoskeletal:  No edema  Skin: no lesions or scars noted  Psych: mood good, affect full range and congruent with mood.  Neurologic: AAOx3 Gen: NAD  Eyes:  sclerae white, no icterus  ENT: Moist mucous membranes. No exudates  Neck: supple, no LAD, mass or goiter, trachea midline  CV: Tachy. Audible S1 and S2. No murmurs, rubs, gallops, S3, nor S4  Pulm: hypoxic to 92% on RA, Bibasilar rales, more prominent on right  Abd: BS+, nondistended, No tenderness to palpation  Musculoskeletal:  No edema  Skin: no lesions or scars noted  Psych: mood good, affect full range and congruent with mood.  Neurologic: AAOx3    Wong:  General: No distress.  Mentation at baseline.   HEENT: WNL  Chest/Lungs: crackles on right base, No wheeze, No retractions, No increased work of breathing, Normal rate  Heart: S1S2 RRR, No M/R/G, Pules equal Bilaterally in upper and lower extremities distally

## 2022-10-07 ENCOUNTER — APPOINTMENT (OUTPATIENT)
Dept: INTERNAL MEDICINE | Facility: CLINIC | Age: 47
End: 2022-10-07

## 2022-10-07 ENCOUNTER — NON-APPOINTMENT (OUTPATIENT)
Age: 47
End: 2022-10-07

## 2022-10-07 VITALS
OXYGEN SATURATION: 97 % | SYSTOLIC BLOOD PRESSURE: 124 MMHG | RESPIRATION RATE: 16 BRPM | WEIGHT: 135 LBS | HEART RATE: 83 BPM | DIASTOLIC BLOOD PRESSURE: 80 MMHG | HEIGHT: 66 IN | TEMPERATURE: 98 F | BODY MASS INDEX: 21.69 KG/M2

## 2022-10-07 DIAGNOSIS — Z87.891 PERSONAL HISTORY OF NICOTINE DEPENDENCE: ICD-10-CM

## 2022-10-07 DIAGNOSIS — R92.2 INCONCLUSIVE MAMMOGRAM: ICD-10-CM

## 2022-10-07 DIAGNOSIS — Z87.898 PERSONAL HISTORY OF OTHER SPECIFIED CONDITIONS: ICD-10-CM

## 2022-10-07 DIAGNOSIS — Z82.49 FAMILY HISTORY OF ISCHEMIC HEART DISEASE AND OTHER DISEASES OF THE CIRCULATORY SYSTEM: ICD-10-CM

## 2022-10-07 DIAGNOSIS — Z53.20 PROCEDURE AND TREATMENT NOT CARRIED OUT BECAUSE OF PATIENT'S DECISION FOR UNSPECIFIED REASONS: ICD-10-CM

## 2022-10-07 DIAGNOSIS — Z78.9 OTHER SPECIFIED HEALTH STATUS: ICD-10-CM

## 2022-10-07 DIAGNOSIS — R20.0 ANESTHESIA OF SKIN: ICD-10-CM

## 2022-10-07 DIAGNOSIS — M25.50 PAIN IN UNSPECIFIED JOINT: ICD-10-CM

## 2022-10-07 LAB
BILIRUB UR QL STRIP: NEGATIVE
CLARITY UR: CLEAR
COLLECTION METHOD: NORMAL
GLUCOSE UR-MCNC: NEGATIVE
HCG UR QL: 0.2 EU/DL
HGB UR QL STRIP.AUTO: NORMAL
KETONES UR-MCNC: NEGATIVE
LEUKOCYTE ESTERASE UR QL STRIP: NEGATIVE
NITRITE UR QL STRIP: NEGATIVE
PH UR STRIP: 5
PROT UR STRIP-MCNC: NEGATIVE
SP GR UR STRIP: 1.03

## 2022-10-07 PROCEDURE — 99386 PREV VISIT NEW AGE 40-64: CPT | Mod: 25

## 2022-10-07 PROCEDURE — 81003 URINALYSIS AUTO W/O SCOPE: CPT | Mod: QW

## 2022-10-07 PROCEDURE — 93000 ELECTROCARDIOGRAM COMPLETE: CPT

## 2022-10-07 RX ORDER — KETOCONAZOLE 20.5 MG/ML
2 SHAMPOO, SUSPENSION TOPICAL
Qty: 120 | Refills: 5 | Status: DISCONTINUED | COMMUNITY
Start: 2018-08-14 | End: 2022-10-07

## 2022-10-07 RX ORDER — BENZONATATE 200 MG/1
200 CAPSULE ORAL 3 TIMES DAILY
Qty: 21 | Refills: 0 | Status: DISCONTINUED | COMMUNITY
Start: 2019-09-14 | End: 2022-10-07

## 2022-10-07 RX ORDER — SODIUM CHLORIDE FOR INHALATION 0.9 %
0.9 VIAL, NEBULIZER (ML) INHALATION
Qty: 3 | Refills: 3 | Status: DISCONTINUED | COMMUNITY
Start: 2017-12-06 | End: 2022-10-07

## 2022-10-07 RX ORDER — CHOLECALCIFEROL (VITAMIN D3) 25 MCG
TABLET ORAL
Refills: 0 | Status: ACTIVE | COMMUNITY

## 2022-10-07 RX ORDER — CYCLOBENZAPRINE HYDROCHLORIDE 10 MG/1
10 TABLET, FILM COATED ORAL 3 TIMES DAILY
Qty: 21 | Refills: 0 | Status: DISCONTINUED | COMMUNITY
Start: 2019-12-05 | End: 2022-10-07

## 2022-10-07 RX ORDER — NITROFURANTOIN (MONOHYDRATE/MACROCRYSTALS) 25; 75 MG/1; MG/1
100 CAPSULE ORAL TWICE DAILY
Qty: 10 | Refills: 0 | Status: DISCONTINUED | COMMUNITY
Start: 2021-09-24 | End: 2022-10-07

## 2022-10-07 RX ORDER — PARSLEY 450 MG
50 CAPSULE ORAL
Refills: 0 | Status: ACTIVE | COMMUNITY

## 2022-10-07 RX ORDER — ONDANSETRON 4 MG/1
4 TABLET, ORALLY DISINTEGRATING ORAL
Qty: 5 | Refills: 0 | Status: DISCONTINUED | COMMUNITY
Start: 2018-06-11 | End: 2022-10-07

## 2022-10-07 RX ORDER — VITAMIN B COMPLEX
CAPSULE ORAL
Refills: 0 | Status: ACTIVE | COMMUNITY

## 2022-10-07 RX ORDER — ELUXADOLINE 100 MG/1
100 TABLET, FILM COATED ORAL
Qty: 60 | Refills: 2 | Status: DISCONTINUED | COMMUNITY
Start: 2020-01-09 | End: 2022-10-07

## 2022-10-10 PROBLEM — R92.2 DENSE BREAST TISSUE: Status: ACTIVE | Noted: 2021-10-19

## 2022-10-12 LAB — BACTERIA UR CULT: ABNORMAL

## 2022-10-13 ENCOUNTER — TRANSCRIPTION ENCOUNTER (OUTPATIENT)
Age: 47
End: 2022-10-13

## 2022-10-13 ENCOUNTER — NON-APPOINTMENT (OUTPATIENT)
Age: 47
End: 2022-10-13

## 2022-10-18 ENCOUNTER — APPOINTMENT (OUTPATIENT)
Dept: ULTRASOUND IMAGING | Facility: CLINIC | Age: 47
End: 2022-10-18

## 2022-10-18 ENCOUNTER — RESULT REVIEW (OUTPATIENT)
Age: 47
End: 2022-10-18

## 2022-10-18 ENCOUNTER — APPOINTMENT (OUTPATIENT)
Dept: MAMMOGRAPHY | Facility: CLINIC | Age: 47
End: 2022-10-18

## 2022-10-18 PROCEDURE — 77067 SCR MAMMO BI INCL CAD: CPT

## 2022-10-18 PROCEDURE — 76641 ULTRASOUND BREAST COMPLETE: CPT | Mod: 50

## 2022-10-18 PROCEDURE — 77063 BREAST TOMOSYNTHESIS BI: CPT

## 2022-10-19 DIAGNOSIS — N64.89 OTHER SPECIFIED DISORDERS OF BREAST: ICD-10-CM

## 2022-10-19 LAB
ALBUMIN SERPL ELPH-MCNC: 4 G/DL
ALP BLD-CCNC: 47 U/L
ALT SERPL-CCNC: 13 U/L
ANION GAP SERPL CALC-SCNC: 13 MMOL/L
AST SERPL-CCNC: 13 U/L
BASOPHILS # BLD AUTO: 0.1 K/UL
BASOPHILS NFR BLD AUTO: 0.7 %
BILIRUB SERPL-MCNC: 1 MG/DL
BUN SERPL-MCNC: 11 MG/DL
CALCIUM SERPL-MCNC: 9.3 MG/DL
CHLORIDE SERPL-SCNC: 101 MMOL/L
CHOLEST SERPL-MCNC: 220 MG/DL
CO2 SERPL-SCNC: 26 MMOL/L
CREAT SERPL-MCNC: 0.78 MG/DL
EGFR: 93 ML/MIN/1.73M2
EOSINOPHIL # BLD AUTO: 0.14 K/UL
EOSINOPHIL NFR BLD AUTO: 1 %
ESTIMATED AVERAGE GLUCOSE: 108 MG/DL
FERRITIN SERPL-MCNC: 25 NG/ML
FOLATE SERPL-MCNC: >20 NG/ML
GLUCOSE SERPL-MCNC: 95 MG/DL
HBA1C MFR BLD HPLC: 5.4 %
HCT VFR BLD CALC: 43.3 %
HDLC SERPL-MCNC: 64 MG/DL
HGB BLD-MCNC: 12.9 G/DL
IMM GRANULOCYTES NFR BLD AUTO: 0.4 %
IRON SERPL-MCNC: 24 UG/DL
LDLC SERPL CALC-MCNC: 134 MG/DL
LYMPHOCYTES # BLD AUTO: 4.59 K/UL
LYMPHOCYTES NFR BLD AUTO: 32.3 %
MAN DIFF?: NORMAL
MCHC RBC-ENTMCNC: 28 PG
MCHC RBC-ENTMCNC: 29.8 GM/DL
MCV RBC AUTO: 94.1 FL
MONOCYTES # BLD AUTO: 0.94 K/UL
MONOCYTES NFR BLD AUTO: 6.6 %
NEUTROPHILS # BLD AUTO: 8.4 K/UL
NEUTROPHILS NFR BLD AUTO: 59 %
NONHDLC SERPL-MCNC: 156 MG/DL
PLATELET # BLD AUTO: 393 K/UL
POTASSIUM SERPL-SCNC: 4.5 MMOL/L
PROT SERPL-MCNC: 6.9 G/DL
RBC # BLD: 4.6 M/UL
RBC # FLD: 13.6 %
SODIUM SERPL-SCNC: 140 MMOL/L
T3FREE SERPL-MCNC: 2.67 PG/ML
T4 FREE SERPL-MCNC: 1.4 NG/DL
TRIGL SERPL-MCNC: 108 MG/DL
TSH SERPL-ACNC: 1.14 UIU/ML
VIT B12 SERPL-MCNC: 1383 PG/ML
WBC # FLD AUTO: 14.22 K/UL

## 2022-10-20 ENCOUNTER — LABORATORY RESULT (OUTPATIENT)
Age: 47
End: 2022-10-20

## 2022-10-20 DIAGNOSIS — N39.0 URINARY TRACT INFECTION, SITE NOT SPECIFIED: ICD-10-CM

## 2022-10-20 RX ORDER — CIPROFLOXACIN HYDROCHLORIDE 250 MG/1
250 TABLET, FILM COATED ORAL
Qty: 10 | Refills: 0 | Status: DISCONTINUED | COMMUNITY
Start: 2022-10-12 | End: 2022-10-20

## 2022-10-24 ENCOUNTER — TRANSCRIPTION ENCOUNTER (OUTPATIENT)
Age: 47
End: 2022-10-24

## 2022-10-25 LAB
APPEARANCE: ABNORMAL
BACTERIA UR CULT: ABNORMAL
BILIRUBIN URINE: NEGATIVE
BLOOD URINE: NEGATIVE
COLOR: YELLOW
GLUCOSE QUALITATIVE U: NEGATIVE
KETONES URINE: NEGATIVE
LEUKOCYTE ESTERASE URINE: NEGATIVE
NITRITE URINE: NEGATIVE
PH URINE: 6
PROTEIN URINE: NEGATIVE
SPECIFIC GRAVITY URINE: >=1.03
UROBILINOGEN URINE: NORMAL

## 2022-10-26 ENCOUNTER — RESULT REVIEW (OUTPATIENT)
Age: 47
End: 2022-10-26

## 2022-10-26 ENCOUNTER — APPOINTMENT (OUTPATIENT)
Dept: ULTRASOUND IMAGING | Facility: CLINIC | Age: 47
End: 2022-10-26

## 2022-10-26 ENCOUNTER — APPOINTMENT (OUTPATIENT)
Dept: MAMMOGRAPHY | Facility: CLINIC | Age: 47
End: 2022-10-26
Payer: COMMERCIAL

## 2022-10-26 PROCEDURE — 77065 DX MAMMO INCL CAD UNI: CPT | Mod: RT

## 2022-10-26 PROCEDURE — G0279: CPT | Mod: RT

## 2022-10-26 PROCEDURE — 76642 ULTRASOUND BREAST LIMITED: CPT | Mod: RT

## 2022-10-26 PROCEDURE — 77061 BREAST TOMOSYNTHESIS UNI: CPT | Mod: RT

## 2023-01-20 ENCOUNTER — APPOINTMENT (OUTPATIENT)
Dept: INTERNAL MEDICINE | Facility: CLINIC | Age: 48
End: 2023-01-20

## 2023-02-02 NOTE — ED ADULT TRIAGE NOTE - ARRIVAL FROM
chest wall non-tender, breathing is unlabored without accessory muscle use, normal breath sounds
Home

## 2023-05-03 ENCOUNTER — RESULT REVIEW (OUTPATIENT)
Age: 48
End: 2023-05-03

## 2023-05-03 ENCOUNTER — APPOINTMENT (OUTPATIENT)
Dept: MAMMOGRAPHY | Facility: CLINIC | Age: 48
End: 2023-05-03
Payer: COMMERCIAL

## 2023-05-03 ENCOUNTER — APPOINTMENT (OUTPATIENT)
Dept: ULTRASOUND IMAGING | Facility: CLINIC | Age: 48
End: 2023-05-03
Payer: COMMERCIAL

## 2023-05-03 PROCEDURE — 77065 DX MAMMO INCL CAD UNI: CPT | Mod: RT

## 2023-05-03 PROCEDURE — 76642 ULTRASOUND BREAST LIMITED: CPT | Mod: RT

## 2023-05-03 PROCEDURE — G0279: CPT | Mod: RT,RT

## 2023-05-05 ENCOUNTER — TRANSCRIPTION ENCOUNTER (OUTPATIENT)
Age: 48
End: 2023-05-05

## 2023-05-05 ENCOUNTER — NON-APPOINTMENT (OUTPATIENT)
Age: 48
End: 2023-05-05

## 2023-05-11 NOTE — PATIENT PROFILE ADULT - NSPROPATIENTLACTATING_GEN_A_NUR
See OT evaluation for all goals and OT POC.  Electronically signed by KAROL Marroquin/L on 5/11/2023 at 10:41 AM no

## 2023-06-01 ENCOUNTER — RESULT CHARGE (OUTPATIENT)
Age: 48
End: 2023-06-01

## 2023-06-01 ENCOUNTER — APPOINTMENT (OUTPATIENT)
Dept: INTERNAL MEDICINE | Facility: CLINIC | Age: 48
End: 2023-06-01
Payer: COMMERCIAL

## 2023-06-01 VITALS
HEART RATE: 61 BPM | OXYGEN SATURATION: 99 % | TEMPERATURE: 98 F | RESPIRATION RATE: 16 BRPM | BODY MASS INDEX: 23.63 KG/M2 | WEIGHT: 147 LBS | DIASTOLIC BLOOD PRESSURE: 76 MMHG | HEIGHT: 66 IN | SYSTOLIC BLOOD PRESSURE: 121 MMHG

## 2023-06-01 PROCEDURE — 99214 OFFICE O/P EST MOD 30 MIN: CPT

## 2023-06-01 RX ORDER — USTEKINUMAB 130 MG/26ML
130 SOLUTION INTRAVENOUS
Refills: 0 | Status: COMPLETED | COMMUNITY
End: 2023-06-01

## 2023-06-01 RX ORDER — IPRATROPIUM BROMIDE AND ALBUTEROL SULFATE 2.5; .5 MG/3ML; MG/3ML
0.5-2.5 (3) SOLUTION RESPIRATORY (INHALATION) 4 TIMES DAILY
Qty: 90 | Refills: 3 | Status: COMPLETED | COMMUNITY
Start: 2019-09-14 | End: 2023-06-01

## 2023-06-01 RX ORDER — DICLOFENAC SODIUM 10 MG/G
1 GEL TOPICAL
Qty: 100 | Refills: 0 | Status: COMPLETED | COMMUNITY
Start: 2019-11-22 | End: 2023-06-01

## 2023-06-01 NOTE — REVIEW OF SYSTEMS
[Incontinence] : no incontinence [Nocturia] : no nocturia [Poor Libido] : libido not poor [Vaginal Discharge] : no vaginal discharge [Dysmenorrhea] : no dysmenorrhea [FreeTextEntry8] : dysuria at end of urination

## 2023-06-01 NOTE — ASSESSMENT
[FreeTextEntry1] : #Lower Urinary tract sx.\par POCT UA- + Blood +LE\par stop monistat\par f/u urine culture\par hx of yeast infections, recent steroid use for rash - trial of [id number] qd\par pt will like to hold abx for now due to hx of rinvoq and stomach sensitivity\par discussed with pt if over weekend no improvement start cipro 250mg bid for 3 days\par f/u urine culture\par contact provider if persistent burning with urination, fever, low back pain, n/v, abdominal pain, poor po intake - if have these discussed go to ED or contact pcp\par pt agrees and understands plan via teach back method. all questions answered.\par f/u OBGYN for women health check up as well.\par \par pt agrees and understands plan via teach back method. all questions answered.\par

## 2023-06-01 NOTE — HISTORY OF PRESENT ILLNESS
[FreeTextEntry8] : 48 F\par LMP: just finishing yesterday.\par Pt previously had rash on face 3 weeks ago resolved s/p steroid treatment and has been approximately rinvoq for 6 months for UC following GI Dr. Kevin Velez. \par presents with 1 week days of vaginal itching taking otc monistat\par but has recently 2-3 dysuria, frequency and urgency.\par Pt is hesitant to take abx at moment due to being on rinvoq\par No fever\par No flank pain\par No abdominal pain\par No nausea or vomiting\par No rash or discharge\par The patient feels well otherwise.\par Pt has no further complaints.\par

## 2023-06-02 ENCOUNTER — APPOINTMENT (OUTPATIENT)
Dept: INTERNAL MEDICINE | Facility: CLINIC | Age: 48
End: 2023-06-02

## 2023-06-04 ENCOUNTER — NON-APPOINTMENT (OUTPATIENT)
Age: 48
End: 2023-06-04

## 2023-06-04 ENCOUNTER — TRANSCRIPTION ENCOUNTER (OUTPATIENT)
Age: 48
End: 2023-06-04

## 2023-06-05 ENCOUNTER — NON-APPOINTMENT (OUTPATIENT)
Age: 48
End: 2023-06-05

## 2023-06-05 LAB — BACTERIA UR CULT: ABNORMAL

## 2023-06-15 ENCOUNTER — APPOINTMENT (OUTPATIENT)
Dept: INTERNAL MEDICINE | Facility: CLINIC | Age: 48
End: 2023-06-15
Payer: COMMERCIAL

## 2023-06-15 VITALS
HEIGHT: 66 IN | TEMPERATURE: 97.9 F | OXYGEN SATURATION: 97 % | DIASTOLIC BLOOD PRESSURE: 80 MMHG | HEART RATE: 77 BPM | WEIGHT: 150 LBS | BODY MASS INDEX: 24.11 KG/M2 | SYSTOLIC BLOOD PRESSURE: 121 MMHG

## 2023-06-15 DIAGNOSIS — R39.9 UNSPECIFIED SYMPTOMS AND SIGNS INVOLVING THE GENITOURINARY SYSTEM: ICD-10-CM

## 2023-06-15 DIAGNOSIS — Z92.89 PERSONAL HISTORY OF OTHER MEDICAL TREATMENT: ICD-10-CM

## 2023-06-15 DIAGNOSIS — Z87.898 PERSONAL HISTORY OF OTHER SPECIFIED CONDITIONS: ICD-10-CM

## 2023-06-15 LAB
ALBUMIN SERPL ELPH-MCNC: 4.5 G/DL
ALP BLD-CCNC: 49 U/L
ALT SERPL-CCNC: 15 U/L
ANION GAP SERPL CALC-SCNC: 14 MMOL/L
AST SERPL-CCNC: 20 U/L
BILIRUB SERPL-MCNC: 1.6 MG/DL
BUN SERPL-MCNC: 12 MG/DL
CALCIUM SERPL-MCNC: 9.9 MG/DL
CHLORIDE SERPL-SCNC: 102 MMOL/L
CHOLEST SERPL-MCNC: 249 MG/DL
CO2 SERPL-SCNC: 24 MMOL/L
CREAT SERPL-MCNC: 0.81 MG/DL
EGFR: 88 ML/MIN/1.73M2
ESTIMATED AVERAGE GLUCOSE: 108 MG/DL
GLUCOSE SERPL-MCNC: 88 MG/DL
HBA1C MFR BLD HPLC: 5.4 %
HDLC SERPL-MCNC: 73 MG/DL
LDLC SERPL CALC-MCNC: 153 MG/DL
NONHDLC SERPL-MCNC: 176 MG/DL
POTASSIUM SERPL-SCNC: 4.5 MMOL/L
PROT SERPL-MCNC: 7.8 G/DL
SODIUM SERPL-SCNC: 139 MMOL/L
T3FREE SERPL-MCNC: 2.59 PG/ML
T4 FREE SERPL-MCNC: 1.3 NG/DL
TRIGL SERPL-MCNC: 112 MG/DL
TSH SERPL-ACNC: 1.14 UIU/ML

## 2023-06-15 PROCEDURE — 99214 OFFICE O/P EST MOD 30 MIN: CPT

## 2023-06-15 RX ORDER — LORATADINE 10 MG
17 TABLET,DISINTEGRATING ORAL
Refills: 0 | Status: ACTIVE | COMMUNITY

## 2023-06-15 RX ORDER — FLUCONAZOLE 150 MG/1
150 TABLET ORAL
Qty: 1 | Refills: 0 | Status: DISCONTINUED | COMMUNITY
Start: 2023-06-01 | End: 2023-06-15

## 2023-06-15 RX ORDER — UPADACITINIB 15 MG/1
15 TABLET, EXTENDED RELEASE ORAL
Refills: 0 | Status: ACTIVE | COMMUNITY

## 2023-06-15 RX ORDER — CIPROFLOXACIN HYDROCHLORIDE 250 MG/1
250 TABLET, FILM COATED ORAL
Qty: 4 | Refills: 0 | Status: DISCONTINUED | COMMUNITY
Start: 2023-06-01 | End: 2023-06-15

## 2023-07-03 ENCOUNTER — APPOINTMENT (OUTPATIENT)
Dept: CT IMAGING | Facility: CLINIC | Age: 48
End: 2023-07-03

## 2023-07-06 ENCOUNTER — APPOINTMENT (OUTPATIENT)
Dept: CT IMAGING | Facility: CLINIC | Age: 48
End: 2023-07-06
Payer: COMMERCIAL

## 2023-07-06 PROCEDURE — 75571 CT HRT W/O DYE W/CA TEST: CPT

## 2023-07-24 ENCOUNTER — APPOINTMENT (OUTPATIENT)
Dept: INTERNAL MEDICINE | Facility: CLINIC | Age: 48
End: 2023-07-24
Payer: COMMERCIAL

## 2023-07-24 VITALS
WEIGHT: 151 LBS | TEMPERATURE: 98 F | OXYGEN SATURATION: 99 % | DIASTOLIC BLOOD PRESSURE: 70 MMHG | HEIGHT: 66 IN | SYSTOLIC BLOOD PRESSURE: 121 MMHG | BODY MASS INDEX: 24.27 KG/M2 | HEART RATE: 85 BPM

## 2023-07-24 DIAGNOSIS — N39.0 URINARY TRACT INFECTION, SITE NOT SPECIFIED: ICD-10-CM

## 2023-07-24 LAB
BILIRUB UR QL STRIP: NEGATIVE
CLARITY UR: NORMAL
COLLECTION METHOD: NORMAL
GLUCOSE UR-MCNC: NEGATIVE
HCG UR QL: 0.2 EU/DL
HGB UR QL STRIP.AUTO: NORMAL
KETONES UR-MCNC: NEGATIVE
LEUKOCYTE ESTERASE UR QL STRIP: NORMAL
NITRITE UR QL STRIP: POSITIVE
PH UR STRIP: 6.5
PROT UR STRIP-MCNC: NORMAL
SP GR UR STRIP: 1.01

## 2023-07-24 PROCEDURE — 99213 OFFICE O/P EST LOW 20 MIN: CPT

## 2023-07-24 RX ORDER — CIPROFLOXACIN HYDROCHLORIDE 250 MG/1
250 TABLET, FILM COATED ORAL
Qty: 10 | Refills: 0 | Status: COMPLETED | COMMUNITY
Start: 2023-07-24 | End: 2023-07-29

## 2023-07-24 NOTE — HISTORY OF PRESENT ILLNESS
[FreeTextEntry8] : LUTs\par \par Ms. JESSE MELÉNDEZ is a 48 year old female hx of UTIs, UC presenting w/ dysuria, abdominal bloating and pressure.\par Similar to symptoms related to her past UTIs\par Denies gross hematuria, abdomina pain or flank pain\par All: Sulfa\par Also gets yeast infection w/ abx\par

## 2023-07-26 ENCOUNTER — NON-APPOINTMENT (OUTPATIENT)
Age: 48
End: 2023-07-26

## 2023-08-09 ENCOUNTER — APPOINTMENT (OUTPATIENT)
Dept: INTERNAL MEDICINE | Facility: CLINIC | Age: 48
End: 2023-08-09
Payer: COMMERCIAL

## 2023-08-09 VITALS
OXYGEN SATURATION: 96 % | BODY MASS INDEX: 23.78 KG/M2 | SYSTOLIC BLOOD PRESSURE: 111 MMHG | HEART RATE: 85 BPM | TEMPERATURE: 98 F | DIASTOLIC BLOOD PRESSURE: 75 MMHG | HEIGHT: 66 IN | WEIGHT: 148 LBS

## 2023-08-09 DIAGNOSIS — Z87.440 PERSONAL HISTORY OF URINARY (TRACT) INFECTIONS: ICD-10-CM

## 2023-08-09 PROCEDURE — 99214 OFFICE O/P EST MOD 30 MIN: CPT

## 2023-08-09 NOTE — HISTORY OF PRESENT ILLNESS
[FreeTextEntry8] : 48 F presents with a few days of URI symptoms including: Sinus congestion/sinus pressure/sinus HA Denies Rhinitis, cough,  Denies  throat irritation Denies fever, body aches  +ear pressure b/l, no otalgia, hearing loss or dizziness Denies SOB or wheezing or CP or palpitations Denies NVD, abdominal pain denies any recent travel denies any recent sick contacts denies any loss of taste or smell.

## 2023-08-09 NOTE — ASSESSMENT
[FreeTextEntry1] : #Sinusitis - amoxilcllin bid for 7 days with food, diflucan x 1 dose - take medication with food - Tylenoll fever body aches - rest drink plenty of fluids - no improvement 3-4 days return to office/tele visit/urgent care no improvememnt f/u ENT #History of UTI - recently tx. no acute sx - pt request retested - discussed not indicated unless symptomatic - pt would like to be tested again.  pt agrees and understands plan via teach back method. all questions answered.  A total of 35 minutes including same day pre-visit chart review, face to face time with patient, coordination of care, and documentation was spent on this visit.

## 2023-08-09 NOTE — REVIEW OF SYSTEMS
[Negative] : Heme/Lymph [Earache] : earache [Postnasal Drip] : postnasal drip [Hearing Loss] : no hearing loss [Nosebleed] : no nosebleeds [Hoarseness] : no hoarseness [Nasal Discharge] : no nasal discharge [Sore Throat] : no sore throat [FreeTextEntry4] : sinus/nasal congestion

## 2023-08-09 NOTE — PHYSICAL EXAM
[No Edema] : there was no peripheral edema [Declined Breast Exam] : declined breast exam  [Declined Rectal Exam] : declined rectal exam [Normal Posterior Cervical Nodes] : no posterior cervical lymphadenopathy [Normal Anterior Cervical Nodes] : no anterior cervical lymphadenopathy [No Spinal Tenderness] : no spinal tenderness [Normal] : affect was normal and insight and judgment were intact [Normal Oropharynx] : the oropharynx was normal [de-identified] : no erythema, no tonsillar enlargement bilaterally, no exudate bilaterally, uvula midline. +PND. reyrthema in nasal mucosa. tenderness on palpation of frontal and maxillary region [de-identified] : declined

## 2023-08-09 NOTE — HEALTH RISK ASSESSMENT
[No] : No [No falls in past year] : Patient reported no falls in the past year [0] : 2) Feeling down, depressed, or hopeless: Not at all (0) [PHQ-2 Negative - No further assessment needed] : PHQ-2 Negative - No further assessment needed [Never] : Never [[id number]] : 0

## 2023-08-10 LAB
APPEARANCE: CLEAR
BACTERIA: NEGATIVE /HPF
BILIRUBIN URINE: NEGATIVE
BLOOD URINE: NEGATIVE
CAST: 0 /LPF
COLOR: YELLOW
EPITHELIAL CELLS: 14 /HPF
GLUCOSE QUALITATIVE U: NEGATIVE MG/DL
KETONES URINE: NEGATIVE MG/DL
LEUKOCYTE ESTERASE URINE: ABNORMAL
MICROSCOPIC-UA: NORMAL
NITRITE URINE: NEGATIVE
PH URINE: 7.5
PROTEIN URINE: NEGATIVE MG/DL
RED BLOOD CELLS URINE: NORMAL /HPF
REVIEW: NORMAL
SPECIFIC GRAVITY URINE: 1.01
UROBILINOGEN URINE: 0.2 MG/DL
WHITE BLOOD CELLS URINE: 0 /HPF

## 2023-08-12 ENCOUNTER — TRANSCRIPTION ENCOUNTER (OUTPATIENT)
Age: 48
End: 2023-08-12

## 2023-08-12 LAB — BACTERIA UR CULT: NORMAL

## 2023-09-18 ENCOUNTER — APPOINTMENT (OUTPATIENT)
Dept: INTERNAL MEDICINE | Facility: CLINIC | Age: 48
End: 2023-09-18

## 2023-10-19 ENCOUNTER — APPOINTMENT (OUTPATIENT)
Dept: INTERNAL MEDICINE | Facility: CLINIC | Age: 48
End: 2023-10-19
Payer: COMMERCIAL

## 2023-10-19 ENCOUNTER — RESULT REVIEW (OUTPATIENT)
Age: 48
End: 2023-10-19

## 2023-10-19 ENCOUNTER — NON-APPOINTMENT (OUTPATIENT)
Age: 48
End: 2023-10-19

## 2023-10-19 VITALS
DIASTOLIC BLOOD PRESSURE: 86 MMHG | HEART RATE: 70 BPM | OXYGEN SATURATION: 98 % | TEMPERATURE: 98 F | HEIGHT: 66 IN | BODY MASS INDEX: 23.78 KG/M2 | WEIGHT: 148 LBS | SYSTOLIC BLOOD PRESSURE: 132 MMHG

## 2023-10-19 DIAGNOSIS — R73.03 PREDIABETES.: ICD-10-CM

## 2023-10-19 DIAGNOSIS — Z00.00 ENCOUNTER FOR GENERAL ADULT MEDICAL EXAMINATION W/OUT ABNORMAL FINDINGS: ICD-10-CM

## 2023-10-19 DIAGNOSIS — N92.6 IRREGULAR MENSTRUATION, UNSPECIFIED: ICD-10-CM

## 2023-10-19 DIAGNOSIS — G60.9 HEREDITARY AND IDIOPATHIC NEUROPATHY, UNSPECIFIED: ICD-10-CM

## 2023-10-19 DIAGNOSIS — L65.9 NONSCARRING HAIR LOSS, UNSPECIFIED: ICD-10-CM

## 2023-10-19 DIAGNOSIS — J01.00 ACUTE MAXILLARY SINUSITIS, UNSPECIFIED: ICD-10-CM

## 2023-10-19 PROCEDURE — 99396 PREV VISIT EST AGE 40-64: CPT | Mod: 25

## 2023-10-19 PROCEDURE — 93000 ELECTROCARDIOGRAM COMPLETE: CPT

## 2023-10-19 PROCEDURE — 36415 COLL VENOUS BLD VENIPUNCTURE: CPT

## 2023-10-19 RX ORDER — LEVALBUTEROL HYDROCHLORIDE 0.63 MG/3ML
0.63 SOLUTION RESPIRATORY (INHALATION)
Qty: 1 | Refills: 1 | Status: ACTIVE | COMMUNITY
Start: 2023-10-19 | End: 1900-01-01

## 2023-10-19 RX ORDER — FLUCONAZOLE 150 MG/1
150 TABLET ORAL
Qty: 1 | Refills: 0 | Status: DISCONTINUED | OUTPATIENT
Start: 2023-08-09 | End: 2023-10-19

## 2023-10-19 RX ORDER — BIOTIN 10 MG
TABLET ORAL
Refills: 0 | Status: ACTIVE | COMMUNITY

## 2023-10-19 RX ORDER — LEVALBUTEROL TARTRATE 45 UG/1
45 AEROSOL, METERED ORAL
Qty: 1 | Refills: 5 | Status: ACTIVE | COMMUNITY
Start: 2017-12-05 | End: 1900-01-01

## 2023-10-19 RX ORDER — FLUCONAZOLE 150 MG/1
150 TABLET ORAL
Qty: 1 | Refills: 0 | Status: DISCONTINUED | COMMUNITY
Start: 2023-07-24 | End: 2023-10-19

## 2023-10-19 RX ORDER — AMOXICILLIN 500 MG/1
500 CAPSULE ORAL TWICE DAILY
Qty: 14 | Refills: 0 | Status: DISCONTINUED | COMMUNITY
Start: 2023-08-09 | End: 2023-10-19

## 2023-10-19 RX ORDER — CLINDAMYCIN PHOSPHATE 1 G/10ML
1 GEL TOPICAL
Refills: 0 | Status: ACTIVE | COMMUNITY

## 2023-10-22 ENCOUNTER — TRANSCRIPTION ENCOUNTER (OUTPATIENT)
Age: 48
End: 2023-10-22

## 2023-10-22 LAB
25(OH)D3 SERPL-MCNC: 92.1 NG/ML
ALBUMIN SERPL ELPH-MCNC: 4.6 G/DL
ALP BLD-CCNC: 60 U/L
ALT SERPL-CCNC: 20 U/L
ANION GAP SERPL CALC-SCNC: 11 MMOL/L
APPEARANCE: CLEAR
AST SERPL-CCNC: 26 U/L
BILIRUB SERPL-MCNC: 1.3 MG/DL
BILIRUBIN URINE: NEGATIVE
BLOOD URINE: NEGATIVE
BUN SERPL-MCNC: 9 MG/DL
CALCIUM SERPL-MCNC: 10.3 MG/DL
CHLORIDE SERPL-SCNC: 99 MMOL/L
CHOLEST SERPL-MCNC: 256 MG/DL
CO2 SERPL-SCNC: 27 MMOL/L
COLOR: YELLOW
CREAT SERPL-MCNC: 0.69 MG/DL
EGFR: 106 ML/MIN/1.73M2
ESTIMATED AVERAGE GLUCOSE: 103 MG/DL
FERRITIN SERPL-MCNC: 38 NG/ML
FSH SERPL-MCNC: 7.3 IU/L
GLUCOSE QUALITATIVE U: NEGATIVE MG/DL
GLUCOSE SERPL-MCNC: 89 MG/DL
HBA1C MFR BLD HPLC: 5.2 %
HCG SERPL-MCNC: <1 MIU/ML
HCT VFR BLD CALC: 42.1 %
HDLC SERPL-MCNC: 73 MG/DL
HGB BLD-MCNC: 14 G/DL
IRON SERPL-MCNC: 64 UG/DL
KETONES URINE: NEGATIVE MG/DL
LDLC SERPL CALC-MCNC: 156 MG/DL
LEUKOCYTE ESTERASE URINE: NEGATIVE
MCHC RBC-ENTMCNC: 30.4 PG
MCHC RBC-ENTMCNC: 33.3 GM/DL
MCV RBC AUTO: 91.5 FL
NITRITE URINE: NEGATIVE
NONHDLC SERPL-MCNC: 183 MG/DL
PH URINE: 7
PLATELET # BLD AUTO: 311 K/UL
POTASSIUM SERPL-SCNC: 4.4 MMOL/L
PROGEST SERPL-MCNC: 1.1 NG/ML
PROT SERPL-MCNC: 7.9 G/DL
PROTEIN URINE: NEGATIVE MG/DL
RBC # BLD: 4.6 M/UL
RBC # FLD: 12.8 %
SODIUM SERPL-SCNC: 138 MMOL/L
SPECIFIC GRAVITY URINE: 1.01
T3FREE SERPL-MCNC: 2.85 PG/ML
T4 FREE SERPL-MCNC: 1.3 NG/DL
TRIGL SERPL-MCNC: 154 MG/DL
TSH SERPL-ACNC: 1.1 UIU/ML
UROBILINOGEN URINE: 0.2 MG/DL
WBC # FLD AUTO: 8.23 K/UL

## 2023-10-25 LAB — ESTROGEN SERPL-MCNC: 421 PG/ML

## 2023-11-14 ENCOUNTER — APPOINTMENT (OUTPATIENT)
Dept: ULTRASOUND IMAGING | Facility: CLINIC | Age: 48
End: 2023-11-14
Payer: COMMERCIAL

## 2023-11-14 ENCOUNTER — TRANSCRIPTION ENCOUNTER (OUTPATIENT)
Age: 48
End: 2023-11-14

## 2023-11-14 ENCOUNTER — RESULT REVIEW (OUTPATIENT)
Age: 48
End: 2023-11-14

## 2023-11-14 ENCOUNTER — APPOINTMENT (OUTPATIENT)
Dept: MAMMOGRAPHY | Facility: CLINIC | Age: 48
End: 2023-11-14
Payer: COMMERCIAL

## 2023-11-14 PROCEDURE — 76641 ULTRASOUND BREAST COMPLETE: CPT | Mod: 50

## 2023-11-14 PROCEDURE — 77066 DX MAMMO INCL CAD BI: CPT

## 2023-11-14 PROCEDURE — G0279: CPT

## 2023-11-20 ENCOUNTER — APPOINTMENT (OUTPATIENT)
Dept: INTERNAL MEDICINE | Facility: CLINIC | Age: 48
End: 2023-11-20
Payer: COMMERCIAL

## 2023-11-20 VITALS
HEIGHT: 66 IN | SYSTOLIC BLOOD PRESSURE: 114 MMHG | OXYGEN SATURATION: 97 % | WEIGHT: 148 LBS | BODY MASS INDEX: 23.78 KG/M2 | TEMPERATURE: 98.6 F | HEART RATE: 93 BPM | RESPIRATION RATE: 16 BRPM | DIASTOLIC BLOOD PRESSURE: 75 MMHG

## 2023-11-20 DIAGNOSIS — R68.89 OTHER GENERAL SYMPTOMS AND SIGNS: ICD-10-CM

## 2023-11-20 DIAGNOSIS — H66.90 OTITIS MEDIA, UNSPECIFIED, UNSPECIFIED EAR: ICD-10-CM

## 2023-11-20 PROCEDURE — 99214 OFFICE O/P EST MOD 30 MIN: CPT

## 2023-11-26 ENCOUNTER — NON-APPOINTMENT (OUTPATIENT)
Age: 48
End: 2023-11-26

## 2023-11-26 LAB
ANION GAP SERPL CALC-SCNC: 11 MMOL/L
BUN SERPL-MCNC: 8 MG/DL
CALCIUM SERPL-MCNC: 9.8 MG/DL
CHLORIDE SERPL-SCNC: 97 MMOL/L
CO2 SERPL-SCNC: 27 MMOL/L
CREAT SERPL-MCNC: 0.69 MG/DL
EGFR: 106 ML/MIN/1.73M2
FOLATE SERPL-MCNC: >20 NG/ML
GLUCOSE SERPL-MCNC: 97 MG/DL
HCT VFR BLD CALC: 42.6 %
HGB BLD-MCNC: 13.8 G/DL
INFLUENZA A RESULT: NOT DETECTED
INFLUENZA B RESULT: NOT DETECTED
IRON SATN MFR SERPL: 13 %
IRON SERPL-MCNC: 42 UG/DL
MCHC RBC-ENTMCNC: 29.7 PG
MCHC RBC-ENTMCNC: 32.4 GM/DL
MCV RBC AUTO: 91.6 FL
PLATELET # BLD AUTO: 406 K/UL
POTASSIUM SERPL-SCNC: 4.1 MMOL/L
RBC # BLD: 4.65 M/UL
RBC # FLD: 12.8 %
RESP SYN VIRUS RESULT: NOT DETECTED
SARS-COV-2 RESULT: NOT DETECTED
SODIUM SERPL-SCNC: 135 MMOL/L
TIBC SERPL-MCNC: 326 UG/DL
TSH SERPL-ACNC: 0.93 UIU/ML
UIBC SERPL-MCNC: 284 UG/DL
VIT B12 SERPL-MCNC: 1411 PG/ML
WBC # FLD AUTO: 12.76 K/UL

## 2023-11-29 ENCOUNTER — APPOINTMENT (OUTPATIENT)
Dept: OBGYN | Facility: CLINIC | Age: 48
End: 2023-11-29
Payer: COMMERCIAL

## 2023-11-29 ENCOUNTER — NON-APPOINTMENT (OUTPATIENT)
Age: 48
End: 2023-11-29

## 2023-11-29 PROCEDURE — 99386 PREV VISIT NEW AGE 40-64: CPT

## 2023-12-01 LAB — HPV HIGH+LOW RISK DNA PNL CVX: NOT DETECTED

## 2023-12-04 LAB — CYTOLOGY CVX/VAG DOC THIN PREP: NORMAL

## 2024-01-05 ENCOUNTER — APPOINTMENT (OUTPATIENT)
Dept: INTERNAL MEDICINE | Facility: CLINIC | Age: 49
End: 2024-01-05
Payer: COMMERCIAL

## 2024-01-05 VITALS
BODY MASS INDEX: 23.78 KG/M2 | OXYGEN SATURATION: 97 % | WEIGHT: 148 LBS | TEMPERATURE: 97.9 F | DIASTOLIC BLOOD PRESSURE: 77 MMHG | HEIGHT: 66 IN | SYSTOLIC BLOOD PRESSURE: 116 MMHG | HEART RATE: 80 BPM

## 2024-01-05 DIAGNOSIS — R53.83 OTHER MALAISE: ICD-10-CM

## 2024-01-05 DIAGNOSIS — R53.81 OTHER MALAISE: ICD-10-CM

## 2024-01-05 PROCEDURE — 99214 OFFICE O/P EST MOD 30 MIN: CPT | Mod: 25

## 2024-01-05 PROCEDURE — 36415 COLL VENOUS BLD VENIPUNCTURE: CPT

## 2024-01-05 RX ORDER — AMOXICILLIN 875 MG/1
875 TABLET, FILM COATED ORAL
Qty: 14 | Refills: 0 | Status: DISCONTINUED | COMMUNITY
Start: 2023-11-20 | End: 2024-01-05

## 2024-01-05 NOTE — PHYSICAL EXAM
[No Acute Distress] : no acute distress [Well-Appearing] : well-appearing [No Accessory Muscle Use] : no accessory muscle use [Clear to Auscultation] : lungs were clear to auscultation bilaterally [Normal Rate] : normal rate  [Regular Rhythm] : with a regular rhythm [Normal S1, S2] : normal S1 and S2 [No Murmur] : no murmur heard [Coordination Grossly Intact] : coordination grossly intact [No Focal Deficits] : no focal deficits [Normal Gait] : normal gait [Normal Affect] : the affect was normal [Normal Insight/Judgement] : insight and judgment were intact [de-identified] : cough

## 2024-01-05 NOTE — PLAN
[FreeTextEntry1] : Check blood work today. Prednisone sent. Pt advised to sign up for [facility] portal to review labs and communicate any questions or concerns directly. Yearly physical and return as needed for illness, medication refills, and new or existing complaints. f/u with PCP and rheumatology.

## 2024-01-09 ENCOUNTER — TRANSCRIPTION ENCOUNTER (OUTPATIENT)
Age: 49
End: 2024-01-09

## 2024-01-09 LAB
25(OH)D3 SERPL-MCNC: 95.4 NG/ML
ALBUMIN SERPL ELPH-MCNC: 4.4 G/DL
ALP BLD-CCNC: 55 U/L
ALT SERPL-CCNC: 18 U/L
ANA SER IF-ACNC: NEGATIVE
ANION GAP SERPL CALC-SCNC: 12 MMOL/L
APPEARANCE: CLEAR
AST SERPL-CCNC: 23 U/L
BACTERIA: NEGATIVE /HPF
BASOPHILS # BLD AUTO: 0.03 K/UL
BASOPHILS NFR BLD AUTO: 0.3 %
BILIRUB SERPL-MCNC: 0.8 MG/DL
BILIRUBIN URINE: NEGATIVE
BLOOD URINE: ABNORMAL
BUN SERPL-MCNC: 8 MG/DL
CALCIUM SERPL-MCNC: 9.7 MG/DL
CAST: 0 /LPF
CCP AB SER IA-ACNC: 64 UNITS
CHLORIDE SERPL-SCNC: 100 MMOL/L
CO2 SERPL-SCNC: 26 MMOL/L
COLOR: YELLOW
CREAT SERPL-MCNC: 0.69 MG/DL
CRP SERPL-MCNC: <3 MG/L
DSDNA AB SER-ACNC: 21 IU/ML
EGFR: 105 ML/MIN/1.73M2
ENA SS-A AB SER IA-ACNC: <0.2 AL
ENA SS-B AB SER IA-ACNC: <0.2 AL
EOSINOPHIL # BLD AUTO: 0.02 K/UL
EOSINOPHIL NFR BLD AUTO: 0.2 %
EPITHELIAL CELLS: 3 /HPF
ERYTHROCYTE [SEDIMENTATION RATE] IN BLOOD BY WESTERGREN METHOD: 47 MM/HR
FOLATE SERPL-MCNC: 18.3 NG/ML
GLUCOSE QUALITATIVE U: NEGATIVE MG/DL
GLUCOSE SERPL-MCNC: 79 MG/DL
HCT VFR BLD CALC: 40.2 %
HGB BLD-MCNC: 13.6 G/DL
IMM GRANULOCYTES NFR BLD AUTO: 0.3 %
KETONES URINE: NEGATIVE MG/DL
LEUKOCYTE ESTERASE URINE: NEGATIVE
LYMPHOCYTES # BLD AUTO: 1.78 K/UL
LYMPHOCYTES NFR BLD AUTO: 15.4 %
MAN DIFF?: NORMAL
MCHC RBC-ENTMCNC: 30.1 PG
MCHC RBC-ENTMCNC: 33.8 GM/DL
MCV RBC AUTO: 88.9 FL
MICROSCOPIC-UA: NORMAL
MONOCYTES # BLD AUTO: 0.81 K/UL
MONOCYTES NFR BLD AUTO: 7 %
NEUTROPHILS # BLD AUTO: 8.85 K/UL
NEUTROPHILS NFR BLD AUTO: 76.8 %
NITRITE URINE: NEGATIVE
PH URINE: 7.5
PLATELET # BLD AUTO: 319 K/UL
POTASSIUM SERPL-SCNC: 4.3 MMOL/L
PROT SERPL-MCNC: 7.9 G/DL
PROTEIN URINE: NEGATIVE MG/DL
RBC # BLD: 4.52 M/UL
RBC # FLD: 12.8 %
RED BLOOD CELLS URINE: 1 /HPF
REVIEW: NORMAL
RF+CCP IGG SER-IMP: ABNORMAL
RHEUMATOID FACT SER QL: 20 IU/ML
SODIUM SERPL-SCNC: 137 MMOL/L
SPECIFIC GRAVITY URINE: 1.01
T4 FREE SERPL-MCNC: 1.5 NG/DL
TSH SERPL-ACNC: 0.7 UIU/ML
UROBILINOGEN URINE: 0.2 MG/DL
VIT B12 SERPL-MCNC: 1558 PG/ML
WBC # FLD AUTO: 11.53 K/UL
WHITE BLOOD CELLS URINE: 0 /HPF

## 2024-01-26 ENCOUNTER — APPOINTMENT (OUTPATIENT)
Dept: INTERNAL MEDICINE | Facility: CLINIC | Age: 49
End: 2024-01-26

## 2024-02-01 ENCOUNTER — APPOINTMENT (OUTPATIENT)
Dept: INTERNAL MEDICINE | Facility: CLINIC | Age: 49
End: 2024-02-01
Payer: COMMERCIAL

## 2024-02-01 VITALS
SYSTOLIC BLOOD PRESSURE: 108 MMHG | RESPIRATION RATE: 16 BRPM | HEIGHT: 66 IN | WEIGHT: 148 LBS | BODY MASS INDEX: 23.78 KG/M2 | OXYGEN SATURATION: 87 % | HEART RATE: 97 BPM | DIASTOLIC BLOOD PRESSURE: 73 MMHG | TEMPERATURE: 98.1 F

## 2024-02-01 DIAGNOSIS — R68.89 OTHER GENERAL SYMPTOMS AND SIGNS: ICD-10-CM

## 2024-02-01 DIAGNOSIS — J34.89 OTHER SPECIFIED DISORDERS OF NOSE AND NASAL SINUSES: ICD-10-CM

## 2024-02-01 DIAGNOSIS — Z01.419 ENCOUNTER FOR GYNECOLOGICAL EXAMINATION (GENERAL) (ROUTINE) W/OUT ABNORMAL FINDINGS: ICD-10-CM

## 2024-02-01 DIAGNOSIS — R76.8 OTHER SPECIFIED ABNORMAL IMMUNOLOGICAL FINDINGS IN SERUM: ICD-10-CM

## 2024-02-01 DIAGNOSIS — Z87.898 PERSONAL HISTORY OF OTHER SPECIFIED CONDITIONS: ICD-10-CM

## 2024-02-01 LAB — S PYO AG SPEC QL IA: NEGATIVE

## 2024-02-01 PROCEDURE — 87880 STREP A ASSAY W/OPTIC: CPT | Mod: QW

## 2024-02-01 PROCEDURE — 99214 OFFICE O/P EST MOD 30 MIN: CPT | Mod: 25

## 2024-02-01 RX ORDER — PREDNISONE 20 MG/1
20 TABLET ORAL DAILY
Qty: 8 | Refills: 0 | Status: DISCONTINUED | COMMUNITY
Start: 2024-01-05 | End: 2024-02-01

## 2024-02-01 RX ORDER — BACILLUS COAGULANS/INULIN 1B-250 MG
CAPSULE ORAL
Refills: 0 | Status: ACTIVE | COMMUNITY

## 2024-02-04 ENCOUNTER — TRANSCRIPTION ENCOUNTER (OUTPATIENT)
Age: 49
End: 2024-02-04

## 2024-02-04 PROBLEM — R76.8 ELEVATED RHEUMATOID FACTOR: Status: ACTIVE | Noted: 2022-10-07

## 2024-02-04 PROBLEM — J34.89 SINUS PRESSURE: Status: RESOLVED | Noted: 2023-08-09 | Resolved: 2024-02-04

## 2024-02-04 LAB — BACTERIA THROAT CULT: NORMAL

## 2024-02-21 NOTE — ED PROVIDER NOTE - CHIEF COMPLAINT
The patient is a 44y Female complaining of
Price (Do Not Change): 0.00
Instructions: This plan will send the code FBSE to the PM system.  DO NOT or CHANGE the price.
Detail Level: Simple

## 2024-02-22 ENCOUNTER — APPOINTMENT (OUTPATIENT)
Dept: INTERVENTIONAL RADIOLOGY/VASCULAR | Facility: CLINIC | Age: 49
End: 2024-02-22

## 2024-02-22 ENCOUNTER — LABORATORY RESULT (OUTPATIENT)
Age: 49
End: 2024-02-22

## 2024-02-22 ENCOUNTER — APPOINTMENT (OUTPATIENT)
Dept: INTERNAL MEDICINE | Facility: CLINIC | Age: 49
End: 2024-02-22
Payer: COMMERCIAL

## 2024-02-22 VITALS
TEMPERATURE: 98.5 F | WEIGHT: 153 LBS | SYSTOLIC BLOOD PRESSURE: 109 MMHG | HEART RATE: 98 BPM | HEIGHT: 66 IN | BODY MASS INDEX: 24.59 KG/M2 | RESPIRATION RATE: 16 BRPM | DIASTOLIC BLOOD PRESSURE: 62 MMHG | OXYGEN SATURATION: 95 %

## 2024-02-22 DIAGNOSIS — H57.9 UNSPECIFIED DISORDER OF EYE AND ADNEXA: ICD-10-CM

## 2024-02-22 DIAGNOSIS — L29.9 PRURITUS, UNSPECIFIED: ICD-10-CM

## 2024-02-22 DIAGNOSIS — H92.03 OTALGIA, BILATERAL: ICD-10-CM

## 2024-02-22 DIAGNOSIS — D72.829 ELEVATED WHITE BLOOD CELL COUNT, UNSPECIFIED: ICD-10-CM

## 2024-02-22 DIAGNOSIS — R93.1 ABNORMAL FINDINGS ON DIAGNOSTIC IMAGING OF HEART AND CORONARY CIRCULATION: ICD-10-CM

## 2024-02-22 DIAGNOSIS — K51.90 ULCERATIVE COLITIS, UNSPECIFIED, W/OUT COMPLICATIONS: ICD-10-CM

## 2024-02-22 DIAGNOSIS — E03.9 HYPOTHYROIDISM, UNSPECIFIED: ICD-10-CM

## 2024-02-22 DIAGNOSIS — J30.2 OTHER SEASONAL ALLERGIC RHINITIS: ICD-10-CM

## 2024-02-22 DIAGNOSIS — Z87.09 PERSONAL HISTORY OF OTHER DISEASES OF THE RESPIRATORY SYSTEM: ICD-10-CM

## 2024-02-22 DIAGNOSIS — J47.9 BRONCHIECTASIS, UNCOMPLICATED: ICD-10-CM

## 2024-02-22 DIAGNOSIS — E55.9 VITAMIN D DEFICIENCY, UNSPECIFIED: ICD-10-CM

## 2024-02-22 DIAGNOSIS — Z92.89 PERSONAL HISTORY OF OTHER MEDICAL TREATMENT: ICD-10-CM

## 2024-02-22 DIAGNOSIS — Z20.818 CONTACT WITH AND (SUSPECTED) EXPOSURE TO OTHER BACTERIAL COMMUNICABLE DISEASES: ICD-10-CM

## 2024-02-22 DIAGNOSIS — E78.00 PURE HYPERCHOLESTEROLEMIA, UNSPECIFIED: ICD-10-CM

## 2024-02-22 DIAGNOSIS — R53.83 OTHER FATIGUE: ICD-10-CM

## 2024-02-22 DIAGNOSIS — J45.909 UNSPECIFIED ASTHMA, UNCOMPLICATED: ICD-10-CM

## 2024-02-22 DIAGNOSIS — R09.82 POSTNASAL DRIP: ICD-10-CM

## 2024-02-22 DIAGNOSIS — C43.9 MALIGNANT MELANOMA OF SKIN, UNSPECIFIED: ICD-10-CM

## 2024-02-22 PROCEDURE — G2211 COMPLEX E/M VISIT ADD ON: CPT | Mod: NC,1L

## 2024-02-22 PROCEDURE — 99214 OFFICE O/P EST MOD 30 MIN: CPT

## 2024-02-22 PROCEDURE — 36415 COLL VENOUS BLD VENIPUNCTURE: CPT

## 2024-02-22 RX ORDER — DIPHENHYDRAMINE HCL 25 MG
25 CAPSULE ORAL
Refills: 0 | Status: ACTIVE | COMMUNITY

## 2024-02-22 RX ORDER — NIACIN 500 MG/1
TABLET ORAL
Refills: 0 | Status: ACTIVE | COMMUNITY

## 2024-02-23 NOTE — ED ADULT NURSE NOTE - NS ED PATIENT SAFETY CONCERN
Radiation Oncology Follow-Up    Patient Name:  Lexx Mitchell  MRN:  [id number]  :  1954    Referring Provider: Erica Bass MD  Primary Care Provider: Vanessa Quinn MD MPH  Care Team: Patient Care Team:  Vanessa Quinn MD MPH as PCP - General (Internal Medicine/Pediatrics)  Vanessa Quinn MD MPH as PCP - [employer] PCP  Deshawn Bedolla MD as Consulting Physician (Hematology and Oncology)    Date of Service: 2024     ONC History:     23: Bilateral screening mammogram: Lobulated mass in the RUQ of the breast. There is a stable mass in the SAIDA quadrant of the breast that is stable.   23: Diagnostic Mammogram/US: Indeterminate mass in the right breast at 9 o'clock 11 cm from the nipple. Probably benign mass in the left breast at 11:30 8 cm from the nipple. Short-term ultrasound and mammogram follow-up in 6 months recommended to ensure stability. Probably benign mass in the right breast at 11 o'clock 5 cm from the nipple. Short-term ultrasound follow-up in 6 months is recommended to ensure stability.  23: L breast BX 4:00: Fibroadenoma with usual ductal hyperplasia. R breast Bx 9:00: Atypical papillary lesion, bordering on low grade DCIS.   23: R breast excision: DCIS, intermediate grade. Positive posterior and superior margin. ER positive (100%)  23: R breast re-excision: negative new margins.   1/3/24 - 24: Ultra-hypofractionated radiation therapy to the right breast consisting of a dose of 26 Gy given in 5 fractions of 5.2 Gy per fraction.   Adjuvant endocrine therapy with anastrozole    SUBJECTIVE  History of Present Illness:   Lexx Mitchell is here for routine radiation follow up.  Doing well.  Breast  is healing well with minimal skin change.  She has some darkening in radiation field but skin reported intact. She endorses mild tenderness in right breast. No swelling of right arm or difficulty with ROM.  No headaches, fever, chills, cough, SOB,  chest pain, N/V or bony pain.  She is taking anastrozole and no adverse effects.  She has known osteopenia.  DEXA next week.  Taking calcium and Vit D supps daily.     Review of Systems:    Review of Systems   All other systems reviewed and are negative.    Performance Status:   The Karnofsky performance scale today is 100, Fully active, able to carry on all pre-disease performed without restriction (ECOG equivalent 0).      OBJECTIVE    Current Outpatient Medications:     amLODIPine (Norvasc) 5 mg tablet, Take 1 tablet (5 mg) by mouth once daily., Disp: 90 tablet, Rfl: 1    anastrozole (Arimidex) 1 mg tablet, Take 1 tablet (1 mg total) by mouth once daily.  Swallow whole with a drink of water., Disp: 30 tablet, Rfl: 11    ascorbic acid (VITAMIN C ORAL), , Disp: , Rfl:     cholecalciferol (Vitamin D-3) 50 mcg (2,000 unit) capsule, Vitamin D3 50 MCG (2000 UT) Oral Capsule  Refills: 0     Active, Disp: , Rfl:     lisinopril 2.5 mg tablet, TAKE 1 TABLET BY MOUTH ONCE DAILY., Disp: 90 tablet, Rfl: 0    multivitamin tablet, Take 1 tablet by mouth once daily., Disp: , Rfl:      Physical Exam  Constitutional:       Appearance: Normal appearance.   HENT:      Head: Normocephalic and atraumatic.      Nose: Nose normal.      Mouth/Throat:      Mouth: Mucous membranes are moist.      Pharynx: Oropharynx is clear.   Eyes:      Conjunctiva/sclera: Conjunctivae normal.      Pupils: Pupils are equal, round, and reactive to light.   Cardiovascular:      Rate and Rhythm: Normal rate and regular rhythm.      Heart sounds: Normal heart sounds.   Pulmonary:      Effort: Pulmonary effort is normal.      Breath sounds: Normal breath sounds.   Chest:   Breasts:     Right: Normal. No swelling, inverted nipple, mass or nipple discharge.      Left: Normal. No swelling, inverted nipple, mass or nipple discharge.          Comments: Right breast with well healed surgical incision. Mild diffuse tanning in radiation field.   Abdominal:       Palpations: Abdomen is soft.   Musculoskeletal:         General: No swelling. Normal range of motion.      Cervical back: Normal range of motion and neck supple.   Lymphadenopathy:      Cervical: No cervical adenopathy.      Upper Body:      Right upper body: No supraclavicular or axillary adenopathy.      Left upper body: No supraclavicular or axillary adenopathy.   Skin:     General: Skin is warm and dry.   Neurological:      General: No focal deficit present.      Mental Status: She is alert and oriented to person, place, and time.   Psychiatric:         Mood and Affect: Mood normal.         Behavior: Behavior normal.       ASSESSMENT/PLAN:  69 y.o. female with DCIS of right breast s/p breast conserving surgery followed by radiation.  Doing well.  Reviewed skin care, possible late effects of treatment, anastrozole side effects and follow up plan.  She will continue anastrozole.  Mammogram in July.  Radiation follow up in 6 mo.  Call with any questions or concerns.     Chely Benson CNP  [phone]   No

## 2024-02-25 ENCOUNTER — TRANSCRIPTION ENCOUNTER (OUTPATIENT)
Age: 49
End: 2024-02-25

## 2024-02-25 ENCOUNTER — RX RENEWAL (OUTPATIENT)
Age: 49
End: 2024-02-25

## 2024-02-25 LAB
BASOPHILS # BLD AUTO: 0.11 K/UL
BASOPHILS NFR BLD AUTO: 1 %
CHOLEST SERPL-MCNC: 270 MG/DL
EOSINOPHIL # BLD AUTO: 0.19 K/UL
EOSINOPHIL NFR BLD AUTO: 1.7 %
HCT VFR BLD CALC: 43.3 %
HDLC SERPL-MCNC: 60 MG/DL
HGB BLD-MCNC: 14.2 G/DL
IMM GRANULOCYTES NFR BLD AUTO: 0.5 %
LDLC SERPL CALC-MCNC: 168 MG/DL
LYMPHOCYTES # BLD AUTO: 2.3 K/UL
LYMPHOCYTES NFR BLD AUTO: 20.2 %
MAN DIFF?: NORMAL
MCHC RBC-ENTMCNC: 30.1 PG
MCHC RBC-ENTMCNC: 32.8 GM/DL
MCV RBC AUTO: 91.7 FL
MONOCYTES # BLD AUTO: 1.04 K/UL
MONOCYTES NFR BLD AUTO: 9.2 %
NEUTROPHILS # BLD AUTO: 7.66 K/UL
NEUTROPHILS NFR BLD AUTO: 67.4 %
NONHDLC SERPL-MCNC: 210 MG/DL
PLATELET # BLD AUTO: 437 K/UL
RBC # BLD: 4.72 M/UL
RBC # FLD: 13.1 %
TRIGL SERPL-MCNC: 227 MG/DL
WBC # FLD AUTO: 11.36 K/UL

## 2024-02-26 ENCOUNTER — TRANSCRIPTION ENCOUNTER (OUTPATIENT)
Age: 49
End: 2024-02-26

## 2024-02-26 PROBLEM — R53.83 FATIGUE, UNSPECIFIED TYPE: Status: ACTIVE | Noted: 2023-11-20

## 2024-02-26 PROBLEM — R93.1 ELEVATED CORONARY ARTERY CALCIUM SCORE: Status: ACTIVE | Noted: 2023-07-24

## 2024-02-26 PROBLEM — D72.829 LEUKOCYTOSIS, UNSPECIFIED TYPE: Status: ACTIVE | Noted: 2024-02-22

## 2024-02-26 PROBLEM — K51.90 ULCERATIVE COLITIS: Status: ACTIVE | Noted: 2019-08-30

## 2024-02-26 PROBLEM — E78.00 HIGH CHOLESTEROL: Status: ACTIVE | Noted: 2017-04-07

## 2024-02-26 PROBLEM — H57.9 ITCHY, WATERY, AND RED EYE: Status: ACTIVE | Noted: 2024-02-22

## 2024-02-26 PROBLEM — R09.82 POSTNASAL DRIP: Status: ACTIVE | Noted: 2024-02-01

## 2024-02-26 PROBLEM — J45.909 REACTIVE AIRWAY DISEASE: Status: ACTIVE | Noted: 2017-04-07

## 2024-02-26 PROBLEM — L29.9 EAR ITCH: Status: ACTIVE | Noted: 2024-02-22

## 2024-02-26 LAB
A ALTERNATA IGE QN: <0.1 KUA/L
A FUMIGATUS IGE QN: <0.1 KUA/L
C ALBICANS IGE QN: <0.1 KUA/L
C HERBARUM IGE QN: <0.1 KUA/L
CAT DANDER IGE QN: 0.17 KUA/L
COMMON RAGWEED IGE QN: <0.1 KUA/L
D FARINAE IGE QN: <0.1 KUA/L
D PTERONYSS IGE QN: <0.1 KUA/L
DEPRECATED A ALTERNATA IGE RAST QL: 0 (ref 0–?)
DEPRECATED A FUMIGATUS IGE RAST QL: 0 (ref 0–?)
DEPRECATED C ALBICANS IGE RAST QL: 0
DEPRECATED C HERBARUM IGE RAST QL: 0 (ref 0–?)
DEPRECATED CAT DANDER IGE RAST QL: NORMAL (ref 0–?)
DEPRECATED COMMON RAGWEED IGE RAST QL: 0 (ref 0–?)
DEPRECATED D FARINAE IGE RAST QL: 0 (ref 0–?)
DEPRECATED D PTERONYSS IGE RAST QL: 0 (ref 0–?)
DEPRECATED DOG DANDER IGE RAST QL: 3 (ref 0–?)
DEPRECATED M RACEMOSUS IGE RAST QL: 0
DEPRECATED ROACH IGE RAST QL: 0 (ref 0–?)
DEPRECATED TIMOTHY IGE RAST QL: 1 (ref 0–?)
DEPRECATED WHITE OAK IGE RAST QL: 3 (ref 0–?)
DOG DANDER IGE QN: 10.4 KUA/L
M RACEMOSUS IGE QN: <0.1 KUA/L
ROACH IGE QN: <0.1 KUA/L
TIMOTHY IGE QN: 0.54 KUA/L
WHITE OAK IGE QN: 4.03 KUA/L

## 2024-02-26 RX ORDER — LEVOTHYROXINE SODIUM 25 UG/1
25 TABLET ORAL
Qty: 90 | Refills: 1 | Status: ACTIVE | COMMUNITY
Start: 2017-09-01 | End: 1900-01-01

## 2024-02-27 ENCOUNTER — TRANSCRIPTION ENCOUNTER (OUTPATIENT)
Age: 49
End: 2024-02-27

## 2024-02-27 RX ORDER — OLOPATADINE HYDROCHLORIDE 2 MG/ML
0.2 SOLUTION OPHTHALMIC DAILY
Qty: 1 | Refills: 3 | Status: ACTIVE | COMMUNITY
Start: 2024-02-22 | End: 1900-01-01

## 2024-04-26 ENCOUNTER — NON-APPOINTMENT (OUTPATIENT)
Age: 49
End: 2024-04-26

## 2024-05-13 ENCOUNTER — APPOINTMENT (OUTPATIENT)
Dept: INTERNAL MEDICINE | Facility: CLINIC | Age: 49
End: 2024-05-13

## 2024-08-26 ENCOUNTER — RX RENEWAL (OUTPATIENT)
Age: 49
End: 2024-08-26

## 2024-10-01 ENCOUNTER — NON-APPOINTMENT (OUTPATIENT)
Age: 49
End: 2024-10-01

## 2024-10-21 ENCOUNTER — NON-APPOINTMENT (OUTPATIENT)
Age: 49
End: 2024-10-21

## 2024-10-21 ENCOUNTER — APPOINTMENT (OUTPATIENT)
Dept: INTERNAL MEDICINE | Facility: CLINIC | Age: 49
End: 2024-10-21
Payer: COMMERCIAL

## 2024-10-21 ENCOUNTER — LABORATORY RESULT (OUTPATIENT)
Age: 49
End: 2024-10-21

## 2024-10-21 VITALS
HEIGHT: 66 IN | SYSTOLIC BLOOD PRESSURE: 123 MMHG | DIASTOLIC BLOOD PRESSURE: 82 MMHG | HEART RATE: 73 BPM | TEMPERATURE: 98 F | BODY MASS INDEX: 24.32 KG/M2 | WEIGHT: 151.31 LBS | OXYGEN SATURATION: 97 %

## 2024-10-21 DIAGNOSIS — K51.90 ULCERATIVE COLITIS, UNSPECIFIED, W/OUT COMPLICATIONS: ICD-10-CM

## 2024-10-21 DIAGNOSIS — E78.00 PURE HYPERCHOLESTEROLEMIA, UNSPECIFIED: ICD-10-CM

## 2024-10-21 DIAGNOSIS — L70.9 ACNE, UNSPECIFIED: ICD-10-CM

## 2024-10-21 DIAGNOSIS — L65.9 NONSCARRING HAIR LOSS, UNSPECIFIED: ICD-10-CM

## 2024-10-21 DIAGNOSIS — J30.2 OTHER SEASONAL ALLERGIC RHINITIS: ICD-10-CM

## 2024-10-21 DIAGNOSIS — N92.6 IRREGULAR MENSTRUATION, UNSPECIFIED: ICD-10-CM

## 2024-10-21 DIAGNOSIS — R53.83 OTHER FATIGUE: ICD-10-CM

## 2024-10-21 DIAGNOSIS — R73.03 PREDIABETES.: ICD-10-CM

## 2024-10-21 DIAGNOSIS — G60.9 HEREDITARY AND IDIOPATHIC NEUROPATHY, UNSPECIFIED: ICD-10-CM

## 2024-10-21 DIAGNOSIS — Z12.31 ENCOUNTER FOR SCREENING MAMMOGRAM FOR MALIGNANT NEOPLASM OF BREAST: ICD-10-CM

## 2024-10-21 DIAGNOSIS — D72.829 ELEVATED WHITE BLOOD CELL COUNT, UNSPECIFIED: ICD-10-CM

## 2024-10-21 DIAGNOSIS — R92.30 DENSE BREASTS, UNSPECIFIED: ICD-10-CM

## 2024-10-21 DIAGNOSIS — L29.9 PRURITUS, UNSPECIFIED: ICD-10-CM

## 2024-10-21 DIAGNOSIS — R53.81 OTHER MALAISE: ICD-10-CM

## 2024-10-21 DIAGNOSIS — J45.909 UNSPECIFIED ASTHMA, UNCOMPLICATED: ICD-10-CM

## 2024-10-21 DIAGNOSIS — J47.9 BRONCHIECTASIS, UNCOMPLICATED: ICD-10-CM

## 2024-10-21 DIAGNOSIS — E03.9 HYPOTHYROIDISM, UNSPECIFIED: ICD-10-CM

## 2024-10-21 DIAGNOSIS — R09.82 POSTNASAL DRIP: ICD-10-CM

## 2024-10-21 DIAGNOSIS — R51.9 HEADACHE, UNSPECIFIED: ICD-10-CM

## 2024-10-21 DIAGNOSIS — R53.83 OTHER MALAISE: ICD-10-CM

## 2024-10-21 DIAGNOSIS — R93.1 ABNORMAL FINDINGS ON DIAGNOSTIC IMAGING OF HEART AND CORONARY CIRCULATION: ICD-10-CM

## 2024-10-21 DIAGNOSIS — Z00.00 ENCOUNTER FOR GENERAL ADULT MEDICAL EXAMINATION W/OUT ABNORMAL FINDINGS: ICD-10-CM

## 2024-10-21 DIAGNOSIS — N39.0 URINARY TRACT INFECTION, SITE NOT SPECIFIED: ICD-10-CM

## 2024-10-21 DIAGNOSIS — Z28.21 IMMUNIZATION NOT CARRIED OUT BECAUSE OF PATIENT REFUSAL: ICD-10-CM

## 2024-10-21 DIAGNOSIS — C43.9 MALIGNANT MELANOMA OF SKIN, UNSPECIFIED: ICD-10-CM

## 2024-10-21 PROCEDURE — 93000 ELECTROCARDIOGRAM COMPLETE: CPT

## 2024-10-21 PROCEDURE — 36415 COLL VENOUS BLD VENIPUNCTURE: CPT

## 2024-10-21 PROCEDURE — 99396 PREV VISIT EST AGE 40-64: CPT

## 2024-10-23 ENCOUNTER — TRANSCRIPTION ENCOUNTER (OUTPATIENT)
Age: 49
End: 2024-10-23

## 2024-10-23 DIAGNOSIS — J34.89 OTHER SPECIFIED DISORDERS OF NOSE AND NASAL SINUSES: ICD-10-CM

## 2024-10-23 DIAGNOSIS — N39.0 URINARY TRACT INFECTION, SITE NOT SPECIFIED: ICD-10-CM

## 2024-10-24 ENCOUNTER — TRANSCRIPTION ENCOUNTER (OUTPATIENT)
Age: 49
End: 2024-10-24

## 2024-10-25 ENCOUNTER — TRANSCRIPTION ENCOUNTER (OUTPATIENT)
Age: 49
End: 2024-10-25

## 2024-10-25 LAB
APPEARANCE: ABNORMAL
BACTERIA UR CULT: ABNORMAL
BILIRUBIN URINE: NEGATIVE
BLOOD URINE: ABNORMAL
COLOR: YELLOW
GLUCOSE QUALITATIVE U: NEGATIVE MG/DL
KETONES URINE: NEGATIVE MG/DL
LEUKOCYTE ESTERASE URINE: ABNORMAL
NITRITE URINE: POSITIVE
PH URINE: 6.5
PROTEIN URINE: NEGATIVE MG/DL
SPECIFIC GRAVITY URINE: 1.01
UROBILINOGEN URINE: 0.2 MG/DL

## 2024-10-25 RX ORDER — AMOXICILLIN AND CLAVULANATE POTASSIUM 875; 125 MG/1; MG/1
875-125 TABLET, COATED ORAL TWICE DAILY
Qty: 20 | Refills: 0 | Status: ACTIVE | COMMUNITY
Start: 2024-10-25 | End: 1900-01-01

## 2024-10-29 ENCOUNTER — NON-APPOINTMENT (OUTPATIENT)
Age: 49
End: 2024-10-29

## 2024-11-18 ENCOUNTER — TRANSCRIPTION ENCOUNTER (OUTPATIENT)
Age: 49
End: 2024-11-18

## 2024-11-18 ENCOUNTER — RESULT REVIEW (OUTPATIENT)
Age: 49
End: 2024-11-18

## 2024-11-18 ENCOUNTER — APPOINTMENT (OUTPATIENT)
Dept: ULTRASOUND IMAGING | Facility: CLINIC | Age: 49
End: 2024-11-18
Payer: COMMERCIAL

## 2024-11-18 ENCOUNTER — APPOINTMENT (OUTPATIENT)
Dept: MAMMOGRAPHY | Facility: CLINIC | Age: 49
End: 2024-11-18
Payer: COMMERCIAL

## 2024-11-18 PROCEDURE — 77066 DX MAMMO INCL CAD BI: CPT

## 2024-11-18 PROCEDURE — 76641 ULTRASOUND BREAST COMPLETE: CPT | Mod: 50

## 2024-11-18 PROCEDURE — G0279: CPT

## 2024-11-27 ENCOUNTER — RX RENEWAL (OUTPATIENT)
Age: 49
End: 2024-11-27

## 2024-12-30 ENCOUNTER — APPOINTMENT (OUTPATIENT)
Dept: INTERNAL MEDICINE | Facility: CLINIC | Age: 49
End: 2024-12-30
Payer: COMMERCIAL

## 2024-12-30 ENCOUNTER — NON-APPOINTMENT (OUTPATIENT)
Age: 49
End: 2024-12-30

## 2024-12-30 DIAGNOSIS — J06.9 ACUTE UPPER RESPIRATORY INFECTION, UNSPECIFIED: ICD-10-CM

## 2024-12-30 PROCEDURE — 99214 OFFICE O/P EST MOD 30 MIN: CPT

## 2024-12-30 RX ORDER — PREDNISONE 20 MG/1
20 TABLET ORAL
Qty: 10 | Refills: 0 | Status: ACTIVE | COMMUNITY
Start: 2024-12-30 | End: 1900-01-01

## 2024-12-30 RX ORDER — LEVOFLOXACIN 500 MG/1
500 TABLET, FILM COATED ORAL DAILY
Qty: 7 | Refills: 0 | Status: ACTIVE | COMMUNITY
Start: 2024-12-30 | End: 1900-01-01

## 2025-01-27 ENCOUNTER — NON-APPOINTMENT (OUTPATIENT)
Age: 50
End: 2025-01-27

## 2025-01-29 ENCOUNTER — APPOINTMENT (OUTPATIENT)
Dept: ORTHOPEDIC SURGERY | Facility: CLINIC | Age: 50
End: 2025-01-29

## 2025-03-04 ENCOUNTER — RX RENEWAL (OUTPATIENT)
Age: 50
End: 2025-03-04

## 2025-03-17 ENCOUNTER — TRANSCRIPTION ENCOUNTER (OUTPATIENT)
Age: 50
End: 2025-03-17

## 2025-03-18 ENCOUNTER — TRANSCRIPTION ENCOUNTER (OUTPATIENT)
Age: 50
End: 2025-03-18

## 2025-03-26 ENCOUNTER — NON-APPOINTMENT (OUTPATIENT)
Age: 50
End: 2025-03-26

## 2025-03-26 ENCOUNTER — APPOINTMENT (OUTPATIENT)
Dept: ULTRASOUND IMAGING | Facility: CLINIC | Age: 50
End: 2025-03-26
Payer: COMMERCIAL

## 2025-03-26 PROCEDURE — 76700 US EXAM ABDOM COMPLETE: CPT

## 2025-04-28 NOTE — PROGRESS NOTE ADULT - PROBLEM SELECTOR PROBLEM 2
Last office visit 10/18/24.  Last refill pregabalin 1/14/25, but patient is requesting a higher dose of medication.  Please advise.  
Pt requesting a possible higher dosage of this medication.       Patient: calling for medication refill.  Medication(s) set up as pending orders from medication list.  Preferred pharmacy set up and verified.    Patient told to check with pharmacy after 48 hours.  Patient was advised they would be notified only if there is a problem concerning the refill.        Patient has 0 pills left.      
The PDMP database has been reviewed for this patient. No suspicious prescribing patterns identified.  I have completed the two factor identification authorizing transmission to his selected pharmacy for dispensing.    She should not need the refill until May 6.  
Bronchiectasis
Bronchiectasis
Gastroesophageal reflux disease, esophagitis presence not specified

## 2025-05-09 ENCOUNTER — RX RENEWAL (OUTPATIENT)
Age: 50
End: 2025-05-09

## 2025-06-26 ENCOUNTER — APPOINTMENT (OUTPATIENT)
Dept: INTERNAL MEDICINE | Facility: CLINIC | Age: 50
End: 2025-06-26
Payer: COMMERCIAL

## 2025-06-26 ENCOUNTER — NON-APPOINTMENT (OUTPATIENT)
Age: 50
End: 2025-06-26

## 2025-06-26 VITALS
HEART RATE: 86 BPM | TEMPERATURE: 97.9 F | HEIGHT: 66 IN | BODY MASS INDEX: 23.3 KG/M2 | SYSTOLIC BLOOD PRESSURE: 110 MMHG | WEIGHT: 145 LBS | RESPIRATION RATE: 16 BRPM | DIASTOLIC BLOOD PRESSURE: 80 MMHG | OXYGEN SATURATION: 97 %

## 2025-06-26 PROCEDURE — 99214 OFFICE O/P EST MOD 30 MIN: CPT

## 2025-06-26 PROCEDURE — G2211 COMPLEX E/M VISIT ADD ON: CPT | Mod: NC
